# Patient Record
Sex: FEMALE | Race: BLACK OR AFRICAN AMERICAN | Employment: OTHER | URBAN - METROPOLITAN AREA
[De-identification: names, ages, dates, MRNs, and addresses within clinical notes are randomized per-mention and may not be internally consistent; named-entity substitution may affect disease eponyms.]

---

## 2018-01-11 ENCOUNTER — OFFICE VISIT (OUTPATIENT)
Dept: PHYSICAL THERAPY | Facility: HOSPITAL | Age: 73
End: 2018-01-11
Attending: ORTHOPAEDIC SURGERY
Payer: MEDICARE

## 2018-01-11 RX ORDER — COVID-19 ANTIGEN TEST
220 KIT MISCELLANEOUS
COMMUNITY
End: 2018-01-21

## 2018-01-11 NOTE — PROGRESS NOTES
Pre-Op Evaluation     Date of surgery: 1/18/18               Procedure: right total hip replacement  Physician: García Ji    Pertinent PMH: Patient reports no medications or illnesses  Living situation: Patient lives in a home alone.  She has 2 steps to ent to rehab, possibly home with home health if she makes adequate progress.

## 2018-01-15 ENCOUNTER — LAB ENCOUNTER (OUTPATIENT)
Dept: LAB | Facility: HOSPITAL | Age: 73
DRG: 470 | End: 2018-01-15
Attending: ORTHOPAEDIC SURGERY
Payer: MEDICARE

## 2018-01-15 DIAGNOSIS — M19.90 DEGENERATIVE JOINT DISEASE: ICD-10-CM

## 2018-01-15 LAB
ANTIBODY SCREEN: NEGATIVE
MRSA DNA SPEC QL NAA+PROBE: NEGATIVE
RH BLOOD TYPE: NEGATIVE

## 2018-01-15 PROCEDURE — 87641 MR-STAPH DNA AMP PROBE: CPT

## 2018-01-15 PROCEDURE — 36415 COLL VENOUS BLD VENIPUNCTURE: CPT

## 2018-01-15 PROCEDURE — 86850 RBC ANTIBODY SCREEN: CPT

## 2018-01-15 PROCEDURE — 86901 BLOOD TYPING SEROLOGIC RH(D): CPT

## 2018-01-15 PROCEDURE — 86900 BLOOD TYPING SEROLOGIC ABO: CPT

## 2018-01-15 NOTE — CM/SW NOTE
CM-Pre Admission Screening: This Writer met with Patient for a preadmission screening.  The Patient resides alone in [place]  in a single family home with  Living quarters on the first floor and 2 steps to enter.   The Patient has been [address], doing dong

## 2018-01-18 ENCOUNTER — ANESTHESIA EVENT (OUTPATIENT)
Dept: SURGERY | Facility: HOSPITAL | Age: 73
DRG: 470 | End: 2018-01-18
Payer: MEDICARE

## 2018-01-18 ENCOUNTER — HOSPITAL ENCOUNTER (INPATIENT)
Facility: HOSPITAL | Age: 73
LOS: 3 days | Discharge: NURSING, REHAB OR LONG-TERM CARE | DRG: 470 | End: 2018-01-21
Attending: ORTHOPAEDIC SURGERY | Admitting: ORTHOPAEDIC SURGERY
Payer: MEDICARE

## 2018-01-18 ENCOUNTER — SURGERY (OUTPATIENT)
Age: 73
End: 2018-01-18

## 2018-01-18 ENCOUNTER — APPOINTMENT (OUTPATIENT)
Dept: GENERAL RADIOLOGY | Facility: HOSPITAL | Age: 73
DRG: 470 | End: 2018-01-18
Attending: ORTHOPAEDIC SURGERY
Payer: MEDICARE

## 2018-01-18 ENCOUNTER — ANESTHESIA (OUTPATIENT)
Dept: SURGERY | Facility: HOSPITAL | Age: 73
DRG: 470 | End: 2018-01-18
Payer: MEDICARE

## 2018-01-18 DIAGNOSIS — M19.90 DEGENERATIVE JOINT DISEASE: ICD-10-CM

## 2018-01-18 DIAGNOSIS — M16.11 OSTEOARTHRITIS OF RIGHT HIP: Primary | ICD-10-CM

## 2018-01-18 PROCEDURE — 99232 SBSQ HOSP IP/OBS MODERATE 35: CPT | Performed by: HOSPITALIST

## 2018-01-18 PROCEDURE — 73501 X-RAY EXAM HIP UNI 1 VIEW: CPT | Performed by: ORTHOPAEDIC SURGERY

## 2018-01-18 PROCEDURE — 0SR901Z REPLACEMENT OF RIGHT HIP JOINT WITH METAL SYNTHETIC SUBSTITUTE, OPEN APPROACH: ICD-10-PCS | Performed by: ORTHOPAEDIC SURGERY

## 2018-01-18 DEVICE — MOD CUP 20 DEG LNR LG 50-54X32: Type: IMPLANTABLE DEVICE | Site: HIP | Status: FUNCTIONAL

## 2018-01-18 DEVICE — BONE SCREW 6.5X30 SELF-TAP: Type: IMPLANTABLE DEVICE | Site: HIP | Status: FUNCTIONAL

## 2018-01-18 DEVICE — 12/14 COCR FEM HEAD 32MM -3.5: Type: IMPLANTABLE DEVICE | Site: HIP | Status: FUNCTIONAL

## 2018-01-18 DEVICE — IMPLANTABLE DEVICE: Type: IMPLANTABLE DEVICE | Site: HIP | Status: FUNCTIONAL

## 2018-01-18 DEVICE — TM MOD CUP 50MM CLUSTER: Type: IMPLANTABLE DEVICE | Site: HIP | Status: FUNCTIONAL

## 2018-01-18 RX ORDER — ONDANSETRON 2 MG/ML
4 INJECTION INTRAMUSCULAR; INTRAVENOUS EVERY 4 HOURS PRN
Status: DISCONTINUED | OUTPATIENT
Start: 2018-01-18 | End: 2018-01-21

## 2018-01-18 RX ORDER — BISACODYL 10 MG
10 SUPPOSITORY, RECTAL RECTAL
Status: DISCONTINUED | OUTPATIENT
Start: 2018-01-18 | End: 2018-01-21

## 2018-01-18 RX ORDER — MORPHINE SULFATE 2 MG/ML
2 INJECTION, SOLUTION INTRAMUSCULAR; INTRAVENOUS EVERY 10 MIN PRN
Status: DISCONTINUED | OUTPATIENT
Start: 2018-01-18 | End: 2018-01-18 | Stop reason: HOSPADM

## 2018-01-18 RX ORDER — SODIUM CHLORIDE, SODIUM LACTATE, POTASSIUM CHLORIDE, CALCIUM CHLORIDE 600; 310; 30; 20 MG/100ML; MG/100ML; MG/100ML; MG/100ML
INJECTION, SOLUTION INTRAVENOUS CONTINUOUS
Status: DISCONTINUED | OUTPATIENT
Start: 2018-01-18 | End: 2018-01-18 | Stop reason: HOSPADM

## 2018-01-18 RX ORDER — SODIUM PHOSPHATE, DIBASIC AND SODIUM PHOSPHATE, MONOBASIC 7; 19 G/133ML; G/133ML
1 ENEMA RECTAL ONCE AS NEEDED
Status: DISCONTINUED | OUTPATIENT
Start: 2018-01-18 | End: 2018-01-21

## 2018-01-18 RX ORDER — CEFAZOLIN SODIUM/WATER 2 G/20 ML
2 SYRINGE (ML) INTRAVENOUS EVERY 8 HOURS
Status: COMPLETED | OUTPATIENT
Start: 2018-01-18 | End: 2018-01-19

## 2018-01-18 RX ORDER — FAMOTIDINE 20 MG/1
20 TABLET ORAL ONCE
Status: DISCONTINUED | OUTPATIENT
Start: 2018-01-18 | End: 2018-01-18 | Stop reason: HOSPADM

## 2018-01-18 RX ORDER — DOCUSATE SODIUM 100 MG/1
100 CAPSULE, LIQUID FILLED ORAL 2 TIMES DAILY
Status: DISCONTINUED | OUTPATIENT
Start: 2018-01-18 | End: 2018-01-21

## 2018-01-18 RX ORDER — HYDROMORPHONE HYDROCHLORIDE 1 MG/ML
0.2 INJECTION, SOLUTION INTRAMUSCULAR; INTRAVENOUS; SUBCUTANEOUS EVERY 5 MIN PRN
Status: DISCONTINUED | OUTPATIENT
Start: 2018-01-18 | End: 2018-01-18 | Stop reason: HOSPADM

## 2018-01-18 RX ORDER — METOCLOPRAMIDE 10 MG/1
10 TABLET ORAL ONCE
Status: DISCONTINUED | OUTPATIENT
Start: 2018-01-18 | End: 2018-01-18 | Stop reason: HOSPADM

## 2018-01-18 RX ORDER — HYDROCODONE BITARTRATE AND ACETAMINOPHEN 5; 325 MG/1; MG/1
1 TABLET ORAL AS NEEDED
Status: DISCONTINUED | OUTPATIENT
Start: 2018-01-18 | End: 2018-01-18 | Stop reason: HOSPADM

## 2018-01-18 RX ORDER — MORPHINE SULFATE 4 MG/ML
4 INJECTION, SOLUTION INTRAMUSCULAR; INTRAVENOUS EVERY 10 MIN PRN
Status: DISCONTINUED | OUTPATIENT
Start: 2018-01-18 | End: 2018-01-18 | Stop reason: HOSPADM

## 2018-01-18 RX ORDER — NEOSTIGMINE METHYLSULFATE 0.5 MG/ML
INJECTION INTRAVENOUS AS NEEDED
Status: DISCONTINUED | OUTPATIENT
Start: 2018-01-18 | End: 2018-01-18 | Stop reason: SURG

## 2018-01-18 RX ORDER — DIPHENHYDRAMINE HYDROCHLORIDE 50 MG/ML
25 INJECTION INTRAMUSCULAR; INTRAVENOUS ONCE AS NEEDED
Status: ACTIVE | OUTPATIENT
Start: 2018-01-18 | End: 2018-01-18

## 2018-01-18 RX ORDER — HYDROCODONE BITARTRATE AND ACETAMINOPHEN 5; 325 MG/1; MG/1
2 TABLET ORAL AS NEEDED
Status: DISCONTINUED | OUTPATIENT
Start: 2018-01-18 | End: 2018-01-18 | Stop reason: HOSPADM

## 2018-01-18 RX ORDER — MORPHINE SULFATE 10 MG/ML
6 INJECTION, SOLUTION INTRAMUSCULAR; INTRAVENOUS EVERY 10 MIN PRN
Status: DISCONTINUED | OUTPATIENT
Start: 2018-01-18 | End: 2018-01-18 | Stop reason: HOSPADM

## 2018-01-18 RX ORDER — HYDROMORPHONE HYDROCHLORIDE 1 MG/ML
0.4 INJECTION, SOLUTION INTRAMUSCULAR; INTRAVENOUS; SUBCUTANEOUS EVERY 5 MIN PRN
Status: DISCONTINUED | OUTPATIENT
Start: 2018-01-18 | End: 2018-01-18 | Stop reason: HOSPADM

## 2018-01-18 RX ORDER — ACETAMINOPHEN 325 MG/1
650 TABLET ORAL EVERY 4 HOURS PRN
Status: DISCONTINUED | OUTPATIENT
Start: 2018-01-18 | End: 2018-01-21

## 2018-01-18 RX ORDER — DEXAMETHASONE SODIUM PHOSPHATE 4 MG/ML
VIAL (ML) INJECTION AS NEEDED
Status: DISCONTINUED | OUTPATIENT
Start: 2018-01-18 | End: 2018-01-18 | Stop reason: SURG

## 2018-01-18 RX ORDER — DIPHENHYDRAMINE HCL 25 MG
25 CAPSULE ORAL EVERY 4 HOURS PRN
Status: DISCONTINUED | OUTPATIENT
Start: 2018-01-18 | End: 2018-01-21

## 2018-01-18 RX ORDER — SODIUM CHLORIDE 9 MG/ML
INJECTION, SOLUTION INTRAVENOUS CONTINUOUS PRN
Status: DISCONTINUED | OUTPATIENT
Start: 2018-01-18 | End: 2018-01-18 | Stop reason: SURG

## 2018-01-18 RX ORDER — GLYCOPYRROLATE 0.2 MG/ML
INJECTION INTRAMUSCULAR; INTRAVENOUS AS NEEDED
Status: DISCONTINUED | OUTPATIENT
Start: 2018-01-18 | End: 2018-01-18 | Stop reason: SURG

## 2018-01-18 RX ORDER — SODIUM CHLORIDE 0.9 % (FLUSH) 0.9 %
10 SYRINGE (ML) INJECTION AS NEEDED
Status: DISCONTINUED | OUTPATIENT
Start: 2018-01-18 | End: 2018-01-21

## 2018-01-18 RX ORDER — CEFAZOLIN SODIUM/WATER 2 G/20 ML
2 SYRINGE (ML) INTRAVENOUS ONCE
Status: COMPLETED | OUTPATIENT
Start: 2018-01-18 | End: 2018-01-18

## 2018-01-18 RX ORDER — ONDANSETRON 2 MG/ML
INJECTION INTRAMUSCULAR; INTRAVENOUS AS NEEDED
Status: DISCONTINUED | OUTPATIENT
Start: 2018-01-18 | End: 2018-01-18 | Stop reason: SURG

## 2018-01-18 RX ORDER — POLYETHYLENE GLYCOL 3350 17 G/17G
17 POWDER, FOR SOLUTION ORAL DAILY PRN
Status: DISCONTINUED | OUTPATIENT
Start: 2018-01-18 | End: 2018-01-21

## 2018-01-18 RX ORDER — ROCURONIUM BROMIDE 10 MG/ML
INJECTION, SOLUTION INTRAVENOUS AS NEEDED
Status: DISCONTINUED | OUTPATIENT
Start: 2018-01-18 | End: 2018-01-18 | Stop reason: SURG

## 2018-01-18 RX ORDER — ONDANSETRON 2 MG/ML
4 INJECTION INTRAMUSCULAR; INTRAVENOUS ONCE AS NEEDED
Status: COMPLETED | OUTPATIENT
Start: 2018-01-18 | End: 2018-01-18

## 2018-01-18 RX ORDER — DIPHENHYDRAMINE HYDROCHLORIDE 50 MG/ML
12.5 INJECTION INTRAMUSCULAR; INTRAVENOUS EVERY 4 HOURS PRN
Status: DISCONTINUED | OUTPATIENT
Start: 2018-01-18 | End: 2018-01-21

## 2018-01-18 RX ORDER — LIDOCAINE HYDROCHLORIDE 10 MG/ML
INJECTION, SOLUTION EPIDURAL; INFILTRATION; INTRACAUDAL; PERINEURAL AS NEEDED
Status: DISCONTINUED | OUTPATIENT
Start: 2018-01-18 | End: 2018-01-18 | Stop reason: SURG

## 2018-01-18 RX ORDER — SODIUM CHLORIDE, SODIUM LACTATE, POTASSIUM CHLORIDE, CALCIUM CHLORIDE 600; 310; 30; 20 MG/100ML; MG/100ML; MG/100ML; MG/100ML
INJECTION, SOLUTION INTRAVENOUS CONTINUOUS
Status: DISCONTINUED | OUTPATIENT
Start: 2018-01-18 | End: 2018-01-20

## 2018-01-18 RX ORDER — SENNOSIDES 8.6 MG
17.2 TABLET ORAL NIGHTLY
Status: DISCONTINUED | OUTPATIENT
Start: 2018-01-18 | End: 2018-01-21

## 2018-01-18 RX ORDER — ACETAMINOPHEN 500 MG
1000 TABLET ORAL ONCE
Status: COMPLETED | OUTPATIENT
Start: 2018-01-18 | End: 2018-01-18

## 2018-01-18 RX ORDER — HYDROMORPHONE HYDROCHLORIDE 1 MG/ML
0.6 INJECTION, SOLUTION INTRAMUSCULAR; INTRAVENOUS; SUBCUTANEOUS EVERY 5 MIN PRN
Status: DISCONTINUED | OUTPATIENT
Start: 2018-01-18 | End: 2018-01-18 | Stop reason: HOSPADM

## 2018-01-18 RX ORDER — NALOXONE HYDROCHLORIDE 0.4 MG/ML
80 INJECTION, SOLUTION INTRAMUSCULAR; INTRAVENOUS; SUBCUTANEOUS AS NEEDED
Status: DISCONTINUED | OUTPATIENT
Start: 2018-01-18 | End: 2018-01-18 | Stop reason: HOSPADM

## 2018-01-18 RX ORDER — MIDAZOLAM HYDROCHLORIDE 1 MG/ML
INJECTION INTRAMUSCULAR; INTRAVENOUS AS NEEDED
Status: DISCONTINUED | OUTPATIENT
Start: 2018-01-18 | End: 2018-01-18 | Stop reason: SURG

## 2018-01-18 RX ORDER — HYDROCODONE BITARTRATE AND ACETAMINOPHEN 5; 325 MG/1; MG/1
2 TABLET ORAL EVERY 4 HOURS PRN
Status: DISCONTINUED | OUTPATIENT
Start: 2018-01-18 | End: 2018-01-21

## 2018-01-18 RX ORDER — HYDROCODONE BITARTRATE AND ACETAMINOPHEN 5; 325 MG/1; MG/1
1 TABLET ORAL EVERY 4 HOURS PRN
Status: DISCONTINUED | OUTPATIENT
Start: 2018-01-18 | End: 2018-01-21

## 2018-01-18 RX ORDER — METOCLOPRAMIDE HYDROCHLORIDE 5 MG/ML
10 INJECTION INTRAMUSCULAR; INTRAVENOUS EVERY 6 HOURS PRN
Status: ACTIVE | OUTPATIENT
Start: 2018-01-18 | End: 2018-01-20

## 2018-01-18 RX ADMIN — MIDAZOLAM HYDROCHLORIDE 2 MG: 1 INJECTION INTRAMUSCULAR; INTRAVENOUS at 14:18:00

## 2018-01-18 RX ADMIN — SODIUM CHLORIDE: 9 INJECTION, SOLUTION INTRAVENOUS at 14:07:00

## 2018-01-18 RX ADMIN — NEOSTIGMINE METHYLSULFATE 4 MG: 0.5 INJECTION INTRAVENOUS at 14:49:00

## 2018-01-18 RX ADMIN — LIDOCAINE HYDROCHLORIDE 50 MG: 10 INJECTION, SOLUTION EPIDURAL; INFILTRATION; INTRACAUDAL; PERINEURAL at 13:57:00

## 2018-01-18 RX ADMIN — SODIUM CHLORIDE, SODIUM LACTATE, POTASSIUM CHLORIDE, CALCIUM CHLORIDE: 600; 310; 30; 20 INJECTION, SOLUTION INTRAVENOUS at 13:53:00

## 2018-01-18 RX ADMIN — SODIUM CHLORIDE, SODIUM LACTATE, POTASSIUM CHLORIDE, CALCIUM CHLORIDE: 600; 310; 30; 20 INJECTION, SOLUTION INTRAVENOUS at 14:50:00

## 2018-01-18 RX ADMIN — ROCURONIUM BROMIDE 40 MG: 10 INJECTION, SOLUTION INTRAVENOUS at 13:57:00

## 2018-01-18 RX ADMIN — DEXAMETHASONE SODIUM PHOSPHATE 4 MG: 4 MG/ML VIAL (ML) INJECTION at 14:00:00

## 2018-01-18 RX ADMIN — CEFAZOLIN SODIUM/WATER 2 G: 2 G/20 ML SYRINGE (ML) INTRAVENOUS at 14:13:00

## 2018-01-18 RX ADMIN — ONDANSETRON 4 MG: 2 INJECTION INTRAMUSCULAR; INTRAVENOUS at 14:48:00

## 2018-01-18 RX ADMIN — GLYCOPYRROLATE 0.8 MG: 0.2 INJECTION INTRAMUSCULAR; INTRAVENOUS at 14:49:00

## 2018-01-18 NOTE — ANESTHESIA POSTPROCEDURE EVALUATION
Patient: Danilo Nelson    Procedure Summary     Date:  01/18/18 Room / Location:  [address] [place] [address] MAIN OR 06 / [address] [place] [address] MAIN OR    Anesthesia Start:  9607 Anesthesia Stop:      Procedure:  HIP TOTAL REPLACEMENT (Right Hip) Diagnosis:  (Degenerative joint disease )    Feng Florentino

## 2018-01-18 NOTE — ANESTHESIA PREPROCEDURE EVALUATION
Anesthesia PreOp Note    HPI:     Falguni Batista is a 67year old female who presents for preoperative consultation requested by: Jed Vu MD    Date of Surgery: 1/18/2018    Procedure(s):  HIP TOTAL REPLACEMENT  Indication: Degenerative joint disease None on file       Available pre-op labs reviewed. Vital Signs: Body mass index is 20.9 kg/m². height is 1.6 m (5' 3\") and weight is 53.5 kg (118 lb). Her oral temperature is 98.5 °F (36.9 °C).  Her blood pressure is 136/60 and her pulse i

## 2018-01-18 NOTE — H&P
[address] Patient Status:  Surgery Admit    7/15/1945 MRN [id number]   Location [facility] Attending Gabby Sorensen MD   Hosp Day # 0 PCP No primary care provider on file. and round, reactive to light and accommodate. Pupils are approximately 3mm and react to 2mm with reaction to light. Oropharynx is clear. Neck: No tenderness to palpitation.   Full range of motion to flexion and extension, lateral rotation and lateral fle

## 2018-01-18 NOTE — PROGRESS NOTES
[facility] HOSP - [facility]    Progress Note    Usama Bentley Patient Status:  Surgery Admit    7/15/1945 MRN [id number]   [facility] UNIT Attending Rosa Wong MD   Hosp Day # 0 PCP No primary care provider on WBC, HGB, HCT, PLT, CREATSERUM, BUN, NA, K, CL, CO2, GLU, CA, ALB, ALKPHO, BILT, TP, AST, ALT, PTT, INR, PT, T4F, TSH, TSHREFLEX, SHIVA, LIP, GGT, PSA, DDIMER, ESRML, ESRPF, CRP, BNP, MG, PHOS, TROP, CK, CKMB, ESTEFANIA, RPR, B12, ETOH, POCGLU                    F

## 2018-01-19 LAB
ERYTHROCYTE [DISTWIDTH] IN BLOOD BY AUTOMATED COUNT: 12.5 % (ref 11–15)
HCT VFR BLD AUTO: 31.1 % (ref 35–48)
HGB BLD-MCNC: 10.4 G/DL (ref 12–16)
MCH RBC QN AUTO: 30.2 PG (ref 27–32)
MCHC RBC AUTO-ENTMCNC: 33.5 G/DL (ref 32–37)
MCV RBC AUTO: 90 FL (ref 80–100)
PLATELET # BLD AUTO: 157 K/UL (ref 140–400)
PMV BLD AUTO: 8.5 FL (ref 7.4–10.3)
RBC # BLD AUTO: 3.46 M/UL (ref 3.7–5.4)
WBC # BLD AUTO: 8.6 K/UL (ref 4–11)

## 2018-01-19 PROCEDURE — 99232 SBSQ HOSP IP/OBS MODERATE 35: CPT | Performed by: HOSPITALIST

## 2018-01-19 RX ORDER — HYDROCODONE BITARTRATE AND ACETAMINOPHEN 5; 325 MG/1; MG/1
1-2 TABLET ORAL EVERY 6 HOURS PRN
Qty: 40 TABLET | Refills: 0 | Status: SHIPPED | OUTPATIENT
Start: 2018-01-19 | End: 2018-12-26

## 2018-01-19 NOTE — OPERATIVE REPORT
[facility]    PATIENT'S NAME: Griselda Daft   ATTENDING PHYSICIAN: Santiago Perez MD   OPERATING PHYSICIAN: Basilia Dangelo MD   PATIENT ACCOUNT#:   [id number]    LOCATION:  [facility] [address] PACU [facility] 10  MEDICAL RECORD #:   [id number]       DATE OF Oscillating saw was used to resect the head. Attention was made to the acetabulum. Retractors were placed on the anterior posterior rims. The rim was debrided of soft tissue.   Reaming was performed, it was reamed up to a size 50, which had excellent

## 2018-01-19 NOTE — OCCUPATIONAL THERAPY NOTE
OCCUPATIONAL THERAPY EVALUATION - INPATIENT      Room Number: 436/436-A  Evaluation Date: 1/19/2018  Type of Evaluation: Initial  Presenting Problem: l kevon    Physician Order: IP Consult to Occupational Therapy  Reason for Therapy: ADL/IADL Dysfunction and History  Past Medical History:   Diagnosis Date   • Endometriosis    • Parkinson's disease ([facility] [address]    • Scoliosis      Past Surgical History  Past Surgical History:  No date: HYSTERECTOMY      Comment: partial    HOME SITUATION  Type of Home: [address] [place] [address] Minded taking off regular upper body clothing?: None  -   Taking care of personal grooming such as brushing teeth?: None  -   Eating meals?: None    AM-PAC Score:  Score: 18  Approx Degree of Impairment: 46.65%  Standardized Score (AM-PAC Scale): 38.66  CMS Modif

## 2018-01-19 NOTE — CM/SW NOTE
ESTRELLA met with the pt. At bedside to confirm her plan from Preadmission screening to discharge to rehab when medically stable. The pt's first choice for rehab is [facility] and her second choice is [place].   Referral made to both facilities and DON screen h

## 2018-01-19 NOTE — PROGRESS NOTES
[facility]    Progress Note    Azam Grace Patient Status:  Inpatient    7/15/1945 MRN [id number]   Location [facility] 4W/SW/SE Attending Blanche Lujan MD   Hosp Day # 1 PCP No primary care provider on file.      Subjective:

## 2018-01-19 NOTE — PHYSICAL THERAPY NOTE
PHYSICAL THERAPY HIP EVALUATION - INPATIENT     Room Number: 436/436-A  Evaluation Date: 1/19/2018  Type of Evaluation: Initial  Physician Order: PT Eval and Treat    Presenting Problem: right kevon  Reason for Therapy: Mobility Dysfunction and Discharge Rita hip      Past Medical History  Past Medical History:   Diagnosis Date   • Endometriosis    • Parkinson's disease ([facility] [address]    • Scoliosis        Past Surgical History  Past Surgical History:  No date: HYSTERECTOMY      Comment: partial    HOME SITUATION  Type blankets)?: A Lot   -   Sitting down on and standing up from a chair with arms (e.g., wheelchair, bedside commode, etc.): A Lot   -   Moving from lying on back to sitting on the side of the bed?: A Lot   How much help from another person does the patient c and cga   Goal #4   Current Status    Goal #5 Patient verbalizes and/or demonstrates all precautions and safety concerns independently   Goal #5   Current Status    Goal #6 Patient independently performs home exercise program for ROM/strengthening per the

## 2018-01-19 NOTE — CM/SW NOTE
The pt. Is scheduled to discharge to [facility] on Sunday 1/21 at 1p, via [address]. The pt. Is aware of discharge and medicar costs.       Report [facility], [address] [place] [address]

## 2018-01-19 NOTE — PROGRESS NOTES
[facility]  Progress Note     J Carloslindaumair Darden  : 7/15/1945    Status: Inpatient  Day #: 1    Attending: Lizzy Lr MD  PCP: No primary care provider on file.       Assessment and Plan     Osteoarthritis R hip   Post operative day 1 stat GFRAA, GFRNAA, CA, ALB, NA, K, CL, CO2, GLU, MG, PHOS, BILT, AST, ALT, ALKPHO, TP, SHIVA, GGT, LIP, PTT, PT, INR, DDIMER, TSH, T4F, ESR, CRP, BNP, TROP, CK, CKMB, ESTEFANIA, RPR, B12, ETOH in the last 72 hours.     Xr Hip W Or Wo Pelvis 1 View, Right (nfu=[id number])

## 2018-01-20 LAB
ANION GAP SERPL CALC-SCNC: 6 MMOL/L (ref 0–18)
BUN SERPL-MCNC: 12 MG/DL (ref 8–20)
BUN/CREAT SERPL: 16.2 (ref 10–20)
CALCIUM SERPL-MCNC: 8.4 MG/DL (ref 8.5–10.5)
CHLORIDE SERPL-SCNC: 104 MMOL/L (ref 95–110)
CO2 SERPL-SCNC: 25 MMOL/L (ref 22–32)
CREAT SERPL-MCNC: 0.74 MG/DL (ref 0.5–1.5)
ERYTHROCYTE [DISTWIDTH] IN BLOOD BY AUTOMATED COUNT: 13 % (ref 11–15)
GLUCOSE SERPL-MCNC: 104 MG/DL (ref 70–99)
HCT VFR BLD AUTO: 30.7 % (ref 35–48)
HCT VFR BLD AUTO: 30.7 % (ref 35–48)
HGB BLD-MCNC: 10.1 G/DL (ref 12–16)
HGB BLD-MCNC: 10.1 G/DL (ref 12–16)
MCH RBC QN AUTO: 30 PG (ref 27–32)
MCHC RBC AUTO-ENTMCNC: 32.9 G/DL (ref 32–37)
MCV RBC AUTO: 91.2 FL (ref 80–100)
OSMOLALITY UR CALC.SUM OF ELEC: 280 MOSM/KG (ref 275–295)
PLATELET # BLD AUTO: 161 K/UL (ref 140–400)
PMV BLD AUTO: 8.6 FL (ref 7.4–10.3)
POTASSIUM SERPL-SCNC: 3.8 MMOL/L (ref 3.3–5.1)
RBC # BLD AUTO: 3.37 M/UL (ref 3.7–5.4)
SODIUM SERPL-SCNC: 135 MMOL/L (ref 136–144)
WBC # BLD AUTO: 9.5 K/UL (ref 4–11)

## 2018-01-20 PROCEDURE — 99232 SBSQ HOSP IP/OBS MODERATE 35: CPT | Performed by: HOSPITALIST

## 2018-01-20 RX ORDER — PSEUDOEPHEDRINE HCL 30 MG
100 TABLET ORAL 2 TIMES DAILY
Qty: 30 CAPSULE | Refills: 0 | Status: SHIPPED
Start: 2018-01-20 | End: 2018-12-26

## 2018-01-20 RX ORDER — POLYETHYLENE GLYCOL 3350 17 G/17G
17 POWDER, FOR SOLUTION ORAL DAILY PRN
Qty: 30 EACH | Refills: 0 | Status: SHIPPED
Start: 2018-01-20 | End: 2018-12-26

## 2018-01-20 NOTE — PLAN OF CARE
DISCHARGE PLANNING    • Discharge to home or other facility with appropriate resources Progressing        HEMATOLOGIC - ADULT    • Maintains hematologic stability Progressing        Impaired Functional Mobility    • Achieve highest/safest level of mobility

## 2018-01-20 NOTE — PROGRESS NOTES
[facility]  Progress Note     Concepcion Rey  : 7/15/1945    Status: Inpatient  Day #: 2    Attending: King Jackson MD  PCP: No primary care provider on file.       Assessment and Plan     Osteoarthritis R hip   Post operative day 2 stat Recent Labs   Lab  01/20/18   0601   BUN  12   CREATSERUM  0.74   GFRAA  >60   GFRNAA  >60   CA  8.4*   NA  135*   K  3.8   CL  104   CO2  25   GLU  104*       Xr Hip W Or Wo Pelvis 1 View, Right (cpt=73501)    Result Date: 1/18/2018  CONCLUSION:  Naomy

## 2018-01-20 NOTE — PROGRESS NOTES
Brian Downing    Wt Readings from Last 6 Encounters:  01/18/18 : 118 lb (53.5 kg)    67year old  Surgical/Procedural Cases on this Admission     Case IDs Date Procedure Surgeon Location Status    [id number] 1/18/18 HIP TOTAL REPLACEMENT Torsten Sepulveda MD [address] [place] [address] looks good.           Lab Results  Component Value Date   WBC 9.5 01/20/2018   HGB 10.1 01/20/2018   HGB 10.1 01/20/2018   HCT 30.7 01/20/2018   HCT 30.7 01/20/2018    01/20/2018   CREATSERUM 0.74 01/20/2018   BUN 12 01/20/2018    01/20/2018

## 2018-01-20 NOTE — PAYOR COMM NOTE
Admit Orders     Start     Ordered    01/18/18 8081  Admit to inpatient Once  Once     Ordering Provider:  Fco Anaya MD   Question Answer Comment   Diagnosis Degenerative joint disease    Level of Care Acute        01/18/18 1612      -------------- history unknown: Yes      reports that she has never smoked. She has never used smokeless tobacco. She reports that she does not drink alcohol or use drugs.     Allergies:  No Known Allergies    Home Medications:    Prescriptions Prior to Admission:  Naprox arthroplasty. [employer]  1/18/2018  11:27 AM[JN.1]    Electronically signed by Christopher Crowell MD on 1/18/2018 11:28 AM   Attribution Jacelyn Pallas. 1 - Christopher Crowell MD on 1/18/2018 11:27 AM                    MEDICATIONS ADMINISTERED IN LAST 1 DAY

## 2018-01-20 NOTE — PHYSICAL THERAPY NOTE
PHYSICAL THERAPY HIP TREATMENT NOTE - INPATIENT    Room Number: 436/436-A            Presenting Problem: right kevon    Problem List  Principal Problem:    Osteoarthritis of right hip      ASSESSMENT   Consulted w/ RN prior to seeing pt.  Pt was received sitt SHORT FORM - BASIC MOBILITY  How much difficulty does the patient currently have. ..  -   Turning over in bed (including adjusting bedclothes, sheets and blankets)?: A Lot   -   Sitting down on and standing up from a chair with arms (e.g., wheelchair, bedsi ROM/strengthening per the instructions provided in preparation for discharge.    Goal #6  Current Status IN PROGRESS

## 2018-01-21 VITALS
TEMPERATURE: 98 F | HEIGHT: 63 IN | HEART RATE: 77 BPM | WEIGHT: 118 LBS | BODY MASS INDEX: 20.91 KG/M2 | RESPIRATION RATE: 16 BRPM | DIASTOLIC BLOOD PRESSURE: 51 MMHG | OXYGEN SATURATION: 97 % | SYSTOLIC BLOOD PRESSURE: 104 MMHG

## 2018-01-21 LAB
ERYTHROCYTE [DISTWIDTH] IN BLOOD BY AUTOMATED COUNT: 13.2 % (ref 11–15)
HCT VFR BLD AUTO: 28.4 % (ref 35–48)
HGB BLD-MCNC: 9.5 G/DL (ref 12–16)
MCH RBC QN AUTO: 30.2 PG (ref 27–32)
MCHC RBC AUTO-ENTMCNC: 33.3 G/DL (ref 32–37)
MCV RBC AUTO: 90.4 FL (ref 80–100)
PLATELET # BLD AUTO: 147 K/UL (ref 140–400)
PMV BLD AUTO: 8.7 FL (ref 7.4–10.3)
RBC # BLD AUTO: 3.14 M/UL (ref 3.7–5.4)
WBC # BLD AUTO: 8.6 K/UL (ref 4–11)

## 2018-01-21 PROCEDURE — 99239 HOSP IP/OBS DSCHRG MGMT >30: CPT | Performed by: HOSPITALIST

## 2018-01-21 NOTE — DISCHARGE SUMMARY
[facility] HOSPITALIST  DISCHARGE SUMMARY     Sandee Petersen Patient Status:  Inpatient    7/15/1945 MRN [id number]   Location [facility] 4W/SW/SE Attending Bala Miles MD   Hosp Day # 3 PCP No primary care provider on file.      Date of Admissio daily.   Quantity:  30 capsule  Refills:  0     HYDROcodone-acetaminophen 5-325 MG Tabs  Commonly known as:  NORCO      Take 1-2 tablets by mouth every 6 (six) hours as needed for Pain.    Quantity:  40 tablet  Refills:  0     PEG 3350 Pack  Commonly known INSTRUCTIONS:       Other Discharge Instructions: Follow up 2 weeks postop. Keep wound dry. Xarelto for 4 weeks postop. Full weight bearing.   Norco for pain            Hospital Discharge Diagnoses: OA    Lace+ Score: 3  59-90 High Risk  29-58 [facility]

## 2018-01-21 NOTE — PLAN OF CARE
DISCHARGE PLANNING    • Discharge to home or other facility with appropriate resources Progressing    [facility] PLANS TO D/C TO REHAB, [facility] AT 1300 ON 1/21/18      HEMATOLOGIC - ADULT    • Maintains hematologic stability Progressing    [facility] HAS SCDs IN PL INTACT; VOIDING FREELY, USED BEDPAN ONCE THIS EVENING PER PATIENT REQUEST; TOLERATING GENERAL DIET W/O N/V

## 2018-01-22 ENCOUNTER — SNF VISIT (OUTPATIENT)
Dept: INTERNAL MEDICINE CLINIC | Facility: SKILLED NURSING FACILITY | Age: 73
End: 2018-01-22

## 2018-01-22 DIAGNOSIS — Z96.641 STATUS POST TOTAL REPLACEMENT OF RIGHT HIP: ICD-10-CM

## 2018-01-22 DIAGNOSIS — D64.9 ANEMIA, UNSPECIFIED TYPE: ICD-10-CM

## 2018-01-22 DIAGNOSIS — M16.11 PRIMARY OSTEOARTHRITIS OF RIGHT HIP: ICD-10-CM

## 2018-01-22 DIAGNOSIS — R53.1 WEAKNESS: ICD-10-CM

## 2018-01-22 PROCEDURE — 99309 SBSQ NF CARE MODERATE MDM 30: CPT | Performed by: NURSE PRACTITIONER

## 2018-01-22 NOTE — PAYOR COMM NOTE
--------------  CONTINUED STAY REVIEW    Payor: [facility] #:  [id number]  Authorization Number: [id number]    Admit date: 1/18/18  Admit time: 1849  Discharged 1/21    Admitting Physician: Jomar Blancas MD  Attending Physician BLOOD LOSS:  100 mL.     INDICATIONS:  Patient is a 77-year-old female with advanced osteoarthritis of her right hip. Failed conservative management. Elected a right total hip arthroplasty.   Understood the risk of surgery, including but not limited to, f Attention was made to the femur. The lateral neck was debrided of soft tissue. Box chisel was used to open the neck in 15 degrees of anteversion. The canal was opened. Lateralizing reaming was performed.   It was reamed up to a 12 and broached to a 12, - D/C plan: subacute rehab  - Orthopedic surgery following.      Normocytic anemia  - Hb 10.4 baseline unknown no labs done here previously  - Rpt H/H in AM.               Quality:  · DVT Prophylaxis: Xarelto   · CODE status: Full.            Amara Mcmanus • Senna  17.2 mg Oral Nightly   • docusate sodium  100 mg Oral BID      PRN Meds: Normal Saline Flush, sodium chloride 0.9%, acetaminophen **OR** HYDROcodone-acetaminophen **OR** HYDROcodone-acetaminophen, PEG 3350, magnesium hydroxide, bisacodyl, FLEET EN Abdomen: Soft, nontender, nondistended. Positive bowel sounds. No rebound or guarding. Neurologic: No focal neurological deficits.    Musculoskeletal: Motor intact  Extremities: No edema.        Intake/Output Summary (Last 24 hours) at 01/20/18 1343  Last : Thiago Bermudez MD (Physician)      [facility] HOSPITALIST  DISCHARGE SUMMARY            Concepcion Rey Patient Status:  Inpatient    7/15/1945 MRN [id number]   Location [facility] 4W/SW/SE Attending Thiago Bermudez MD   Hosp Day # 3 PCP No docusate sodium 100 MG Caps  Commonly known as:  COLACE    Take 100 mg by mouth 2 (two) times daily.  Quantity:  30 capsule  Refills:  0   HYDROcodone-acetaminophen 5-325 MG Tabs  Commonly known as:  NORCO    Take 1-2 tablets by mouth every 6 (six) hours as Extremities: No edema. -----------------------------------------------------------------------------------------------  PATIENT DISCHARGE INSTRUCTIONS:      Other Discharge Instructions:        Follow up 2 weeks postop. Keep wound dry.   Xarelto for 4 wee

## 2018-01-22 NOTE — PROGRESS NOTES
HPI: Anatoly Moscoso  Is a 68 yo female with severe osteoarthritis of the right hip  admitted to [address] [place] [address] for an elective R total hip arthroplasty by Dr. Ashley Jc 1/18/18. She had a stable postoperative course and was discharged to [facility] for [address].      Chief co po q6 prn/tylenol prn for pain control  - continue bowel program       2. Normocytic anemia. Stable. - Hb  9.5 1/21, monitor cbc in rehab    3. Hx Parkinson's disease. Stable. Not on medications. F/U outpatient. 4. Hx endometriosis s/p hysterectomy.  Tomi Salazar

## 2018-01-26 ENCOUNTER — SNF VISIT (OUTPATIENT)
Dept: INTERNAL MEDICINE CLINIC | Facility: SKILLED NURSING FACILITY | Age: 73
End: 2018-01-26

## 2018-01-26 DIAGNOSIS — R53.1 WEAKNESS: ICD-10-CM

## 2018-01-26 DIAGNOSIS — Z96.641 STATUS POST TOTAL REPLACEMENT OF RIGHT HIP: ICD-10-CM

## 2018-01-26 PROCEDURE — 99307 SBSQ NF CARE SF MDM 10: CPT | Performed by: NURSE PRACTITIONER

## 2018-01-26 NOTE — PROGRESS NOTES
HPI: Sandee Petersen  Is a 66 yo female with severe osteoarthritis of the right hip  admitted to [address] [place] [address] for an elective R total hip arthroplasty by Dr. Funmi Johnson 1/18/18.  She had a stable postoperative course and was discharged to [facility] for [address].      Chief ana Hx endometriosis s/p hysterectomy.  Stable.

## 2018-07-27 ENCOUNTER — HOSPITAL ENCOUNTER (OUTPATIENT)
Dept: MRI IMAGING | Facility: HOSPITAL | Age: 73
Discharge: HOME | End: 2018-07-27
Attending: ORTHOPAEDIC SURGERY
Payer: MEDICARE

## 2018-07-27 DIAGNOSIS — M54.50 LOWER BACK PAIN: ICD-10-CM

## 2018-07-27 DIAGNOSIS — M25.551 RIGHT HIP PAIN: ICD-10-CM

## 2018-07-27 DIAGNOSIS — M54.50 LUMBAR SPINE PAIN: ICD-10-CM

## 2018-07-27 PROCEDURE — 72148 MRI LUMBAR SPINE W/O DYE: CPT | Performed by: ORTHOPAEDIC SURGERY

## 2018-09-04 ENCOUNTER — LAB ENCOUNTER (OUTPATIENT)
Dept: LAB | Facility: HOSPITAL | Age: 73
End: 2018-09-04
Attending: OPTOMETRIST
Payer: MEDICARE

## 2018-09-04 DIAGNOSIS — H04.129 DRY EYE SYNDROME: ICD-10-CM

## 2018-09-04 DIAGNOSIS — E55.9 AVITAMINOSIS D: Primary | ICD-10-CM

## 2018-09-04 PROCEDURE — 36415 COLL VENOUS BLD VENIPUNCTURE: CPT

## 2018-09-04 PROCEDURE — 82306 VITAMIN D 25 HYDROXY: CPT

## 2018-09-05 LAB — 25(OH)D3 SERPL-MCNC: 21.1 NG/ML

## 2018-12-26 ENCOUNTER — OFFICE VISIT (OUTPATIENT)
Dept: PAIN CLINIC | Facility: HOSPITAL | Age: 73
End: 2018-12-26
Attending: ANESTHESIOLOGY
Payer: MEDICARE

## 2018-12-26 VITALS
WEIGHT: 110 LBS | DIASTOLIC BLOOD PRESSURE: 68 MMHG | HEIGHT: 65 IN | HEART RATE: 62 BPM | BODY MASS INDEX: 18.33 KG/M2 | RESPIRATION RATE: 18 BRPM | SYSTOLIC BLOOD PRESSURE: 132 MMHG

## 2018-12-26 DIAGNOSIS — M16.11 PRIMARY OSTEOARTHRITIS OF RIGHT HIP: Primary | ICD-10-CM

## 2018-12-26 DIAGNOSIS — M48.061 SPINAL STENOSIS AT L4-L5 LEVEL: ICD-10-CM

## 2018-12-26 PROCEDURE — 99201 HC OUTPT EVAL AND MGNT NEW PT LEVEL 1: CPT

## 2018-12-26 RX ORDER — ACETAMINOPHEN 500 MG
500 TABLET ORAL EVERY 6 HOURS PRN
COMMUNITY

## 2018-12-26 NOTE — CHRONIC PAIN
Initial Consultation Note      HISTORY OF PRESENT ILLNESS:  Abril Randhawa is a 68year old old female referred to the pain clinic  for evaluation treatment of her right lower back hip pain.   Beth is a very nice 80-year-old black female who complains of pain disease    Past Medical History:   Diagnosis Date   • Endometriosis    • Parkinson's disease ([facility] [address]    • Scoliosis        FAMILY HISTORY:  Family History   Family history unknown: Yes       SOCIAL HISTORY:  Social History    Socioeconomic History      Brittnee Carmona Brachial 2/4 2/4     SLR: negative  SIJ tenderness negative  Joint Exam: Normal  TPs:  Present within lumbo-sacral paraspinal muscles    Right Deep tendon reflexes: 1/4   Left Deep tendon reflexes: 1/4   Babinski Reflex: absent bilaterally   Temperature: to discussion plan.  Greater than 50% of the time was spent with counseling (nature of discussion centered around pain, therapy, and treatment options), face to face time, time spent reviewing data, obtaining patient information and discussing the care with

## 2018-12-26 NOTE — PROGRESS NOTES
INITIAL CONSULT:  12/26/18  PRESENTS AMBULATORY TO CPM;  NEW CONSULT C/O RT HIP PAIN RADIATING DOWN THE RLE;  PT STATES RT HIP REPLCMT 1/2018 BY DR. DR. Kirsten Howard;  SHE HAD REHAD AT [address] WAS D/CD IN February;  SHE REPORTS IN MARCH SHE STARTED TO N

## 2018-12-27 ENCOUNTER — DOCUMENTATION ONLY (OUTPATIENT)
Dept: PAIN CLINIC | Facility: HOSPITAL | Age: 73
End: 2018-12-27

## 2019-01-29 ENCOUNTER — OFFICE VISIT (OUTPATIENT)
Dept: PAIN CLINIC | Facility: HOSPITAL | Age: 74
End: 2019-01-29
Attending: ANESTHESIOLOGY
Payer: MEDICARE

## 2019-01-29 VITALS
SYSTOLIC BLOOD PRESSURE: 128 MMHG | HEIGHT: 65 IN | HEART RATE: 68 BPM | WEIGHT: 110 LBS | RESPIRATION RATE: 18 BRPM | DIASTOLIC BLOOD PRESSURE: 72 MMHG | BODY MASS INDEX: 18.33 KG/M2

## 2019-01-29 DIAGNOSIS — M25.551 HIP PAIN, ACUTE, RIGHT: Primary | ICD-10-CM

## 2019-01-29 PROCEDURE — 99211 OFF/OP EST MAY X REQ PHY/QHP: CPT

## 2019-01-29 NOTE — PROGRESS NOTES
INITIAL CONSULT:  12/26/18  PRESENTS AMBULATORY TO CPM;  NEW CONSULT C/O RT HIP PAIN RADIATING DOWN THE RLE;  PT STATES RT HIP REPLCMT 1/2018 BY DR. DR. Aashish Dunaway;  SHE HAD REHAD AT [address] WAS D/CD IN February;  SHE REPORTS IN MARCH SHE STARTED TO N

## 2019-01-29 NOTE — CHRONIC PAIN
Follow-up Note    HISTORY OF PRESENT ILLNESS:  Brian Downing is a 68year old old female, returns to the clinic for evaluation and treatment of her chronic right hip pain and leg pain Myles ramirez 79-year-old black female who had a right hip replacement last Scoliosis        SURGICAL HISTORY:  Past Surgical History:   Procedure Laterality Date   • HIP TOTAL REPLACEMENT Right 1/18/2018    Performed by Tatianna Baum MD at [address] [place] [address] MAIN OR   • HYSTERECTOMY      partial       FAMILY HISTORY:  Family History   Family Lower Extremity:  Normal  Right Upper Extremity: Normal  Left Upper Extremity: Normal  Edema - Absent    SLR: negative   SIJ tenderness:  negative     TPs: Absent:       IMAGING:  No results found.     [place] PHYSICIAN MONITORING PROGRAM REVIEWED  No      ASSES

## 2019-02-19 ENCOUNTER — OFFICE VISIT (OUTPATIENT)
Dept: PAIN CLINIC | Facility: HOSPITAL | Age: 74
End: 2019-02-19
Attending: ANESTHESIOLOGY
Payer: MEDICARE

## 2019-02-19 ENCOUNTER — ANESTHESIA (OUTPATIENT)
Dept: PAIN CLINIC | Facility: HOSPITAL | Age: 74
End: 2019-02-19

## 2019-02-19 ENCOUNTER — ANESTHESIA EVENT (OUTPATIENT)
Dept: PAIN CLINIC | Facility: HOSPITAL | Age: 74
End: 2019-02-19

## 2019-02-19 VITALS
HEART RATE: 66 BPM | WEIGHT: 110 LBS | SYSTOLIC BLOOD PRESSURE: 128 MMHG | BODY MASS INDEX: 18.33 KG/M2 | DIASTOLIC BLOOD PRESSURE: 74 MMHG | HEIGHT: 65 IN

## 2019-02-19 DIAGNOSIS — M48.061 SPINAL STENOSIS AT L4-L5 LEVEL: ICD-10-CM

## 2019-02-19 DIAGNOSIS — M16.11 PRIMARY OSTEOARTHRITIS OF RIGHT HIP: ICD-10-CM

## 2019-02-19 DIAGNOSIS — M25.551 HIP PAIN, ACUTE, RIGHT: Primary | ICD-10-CM

## 2019-02-19 PROCEDURE — 99211 OFF/OP EST MAY X REQ PHY/QHP: CPT

## 2019-02-19 NOTE — PROGRESS NOTES
INITIAL CONSULT:  12/26/18  PRESENTS AMBULATORY TO CPM;  NEW CONSULT C/O RT HIP PAIN RADIATING DOWN THE RLE;  PT STATES RT HIP REPLCMT 1/2018 BY DR. DR. Jay Mike;  SHE HAD REHAD AT [address] WAS D/CD IN February;  SHE REPORTS IN MARCH SHE STARTED TO

## 2019-02-19 NOTE — CHRONIC PAIN
Lionel Fuentes a 35-year-old black female with right hip and right leg pain she has lumbar spinal stenosis and DDD thinking some of her pain was due to radicular cause she underwent 2 transforaminal injections with about 25% reduction of her pain we also ordered a

## 2019-02-21 ENCOUNTER — TELEPHONE (OUTPATIENT)
Dept: PAIN CLINIC | Facility: HOSPITAL | Age: 74
End: 2019-02-21

## 2019-02-21 DIAGNOSIS — M25.551 RIGHT HIP PAIN: Primary | ICD-10-CM

## 2019-02-25 ENCOUNTER — HOSPITAL ENCOUNTER (OUTPATIENT)
Dept: GENERAL RADIOLOGY | Facility: HOSPITAL | Age: 74
Discharge: HOME | End: 2019-02-25
Attending: NURSE PRACTITIONER
Payer: MEDICARE

## 2019-02-25 DIAGNOSIS — M25.551 RIGHT HIP PAIN: ICD-10-CM

## 2019-02-25 PROCEDURE — 73502 X-RAY EXAM HIP UNI 2-3 VIEWS: CPT | Performed by: NURSE PRACTITIONER

## 2019-03-04 ENCOUNTER — OFFICE VISIT (OUTPATIENT)
Dept: OBGYN CLINIC | Facility: CLINIC | Age: 74
End: 2019-03-04
Payer: COMMERCIAL

## 2019-03-04 VITALS
DIASTOLIC BLOOD PRESSURE: 72 MMHG | HEIGHT: 64 IN | HEART RATE: 82 BPM | SYSTOLIC BLOOD PRESSURE: 125 MMHG | BODY MASS INDEX: 21 KG/M2 | WEIGHT: 123 LBS

## 2019-03-04 DIAGNOSIS — Z90.710 HISTORY OF ABDOMINAL HYSTERECTOMY: ICD-10-CM

## 2019-03-04 DIAGNOSIS — Z01.419 ENCOUNTER FOR GYNECOLOGICAL EXAMINATION WITHOUT ABNORMAL FINDING: Primary | ICD-10-CM

## 2019-03-04 PROCEDURE — G0101 CA SCREEN;PELVIC/BREAST EXAM: HCPCS | Performed by: OBSTETRICS & GYNECOLOGY

## 2019-03-04 NOTE — PROGRESS NOTES
Concepcion Rey is a 68year old female  No LMP recorded. Patient has had a hysterectomy. Patient presents with:  Gyn Exam: NP, Annual -- Prior Aron Villarreal -- hx \"partial total hyst\" for endometriosis & female problems in her 35s.   Wishes to come meet m Physical activity:        Days per week: Not on file        Minutes per session: Not on file      Stress: Not on file    Relationships      Social connections:        Talks on phone: Not on file        Gets together: Not on file        Attends [employer] bleeding. PHYSICAL EXAM:   Blood pressure 125/72, pulse 82, height 5' 4\" (1.626 m), weight 123 lb (55.8 kg).   Constitutional:  well developed, well nourished  Head/Face:  normocephalic  Neck/Thyroid: thyroid symmetric, no thyromegaly, no nodules, no

## 2019-03-19 ENCOUNTER — HOSPITAL ENCOUNTER (OUTPATIENT)
Dept: NUCLEAR MEDICINE | Facility: HOSPITAL | Age: 74
Discharge: HOME | End: 2019-03-19
Attending: ANESTHESIOLOGY
Payer: MEDICARE

## 2019-03-19 DIAGNOSIS — M25.551 HIP PAIN, ACUTE, RIGHT: ICD-10-CM

## 2019-03-19 PROCEDURE — 78315 BONE IMAGING 3 PHASE: CPT | Performed by: ANESTHESIOLOGY

## 2019-03-25 ENCOUNTER — OFFICE VISIT (OUTPATIENT)
Dept: PAIN CLINIC | Facility: HOSPITAL | Age: 74
End: 2019-03-25
Attending: NURSE PRACTITIONER
Payer: MEDICARE

## 2019-03-25 DIAGNOSIS — M16.11 PRIMARY OSTEOARTHRITIS OF RIGHT HIP: Primary | ICD-10-CM

## 2019-03-25 PROCEDURE — 99213 OFFICE O/P EST LOW 20 MIN: CPT

## 2019-03-25 PROCEDURE — 3E013KZ INTRODUCTION OF OTHER DIAGNOSTIC SUBSTANCE INTO SUBCUTANEOUS TISSUE, PERCUTANEOUS APPROACH: ICD-10-PCS | Performed by: ANESTHESIOLOGY

## 2019-03-25 PROCEDURE — 20552 NJX 1/MLT TRIGGER POINT 1/2: CPT

## 2019-03-25 PROCEDURE — 99211 OFF/OP EST MAY X REQ PHY/QHP: CPT

## 2019-03-25 RX ORDER — ACETAMINOPHEN AND CODEINE PHOSPHATE 60; 300 MG/1; MG/1
1 TABLET ORAL AS NEEDED
COMMUNITY
End: 2019-08-09

## 2019-03-25 NOTE — CHRONIC PAIN
[facility]  Pain Procedure Note    Procedure Type:  Right hip scar infiltration   Prep:  Hand Washing, Time Out, Sterile Gloves  Site Prep:  Alcohol   Position:  Sitting  Level:  Right hip   Local Anesthetic:  1/4% Marcaine 10 ml   Needle:

## 2019-03-25 NOTE — CHRONIC PAIN
Follow-up Note  CC: right hip pain   HISTORY OF PRESENT ILLNESS:  Cherylene Loud is a 68year old old female, originally referred to the pain clinic by Dr. Dan ref.  provider found, with history of Primary osteoarthritis of right hip  (primary encounter diagno current lymphedema  Allergic/Immunologic:  Negative  Musculoskeletal: As above  Neurological: As above  Denies chest pain, shortness of breath.     MEDICAL HISTORY:  Patient Active Problem List:     Osteoarthritis of right hip     Degenerative joint disease of clubs or organizations: Not on file        Relationship status: Not on file      Intimate partner violence:        Fear of current or ex partner: Not on file        Emotionally abused: Not on file        Physically abused: Not on file        Forced sexu prosthesis. WHOLE BODY IMAGES:            There is severe scoliosis. Dilated right renal collecting system noted. There is uptake in the shoulders consistent with DJD.              =====  CONCLUSION:   1.  No evidence of prosthesis loosening or infection

## 2019-03-25 NOTE — PROGRESS NOTES
Patient presents to [facility] ambulatory for follow up. Dr Óscar Abrams ordered a bone scan for pt last visit. She is here to discuss results and make a plan for treatment. She takes T#3 as needed. UMBERTON Van in to see patient. See provider notes.

## 2019-07-17 ENCOUNTER — HOSPITAL ENCOUNTER (OUTPATIENT)
Dept: MRI IMAGING | Facility: HOSPITAL | Age: 74
Discharge: HOME | End: 2019-07-17
Attending: ORTHOPAEDIC SURGERY
Payer: MEDICARE

## 2019-07-17 DIAGNOSIS — M70.61 GREATER TROCHANTERIC BURSITIS, RIGHT: ICD-10-CM

## 2019-07-17 DIAGNOSIS — M25.551 RIGHT HIP PAIN: ICD-10-CM

## 2019-07-17 PROCEDURE — 73721 MRI JNT OF LWR EXTRE W/O DYE: CPT | Performed by: ORTHOPAEDIC SURGERY

## 2019-08-09 ENCOUNTER — OFFICE VISIT (OUTPATIENT)
Dept: PAIN CLINIC | Facility: HOSPITAL | Age: 74
End: 2019-08-09
Attending: ANESTHESIOLOGY
Payer: MEDICARE

## 2019-08-09 VITALS
DIASTOLIC BLOOD PRESSURE: 72 MMHG | WEIGHT: 123 LBS | RESPIRATION RATE: 18 BRPM | BODY MASS INDEX: 21 KG/M2 | HEART RATE: 81 BPM | HEIGHT: 64 IN | SYSTOLIC BLOOD PRESSURE: 137 MMHG

## 2019-08-09 DIAGNOSIS — M48.061 SPINAL STENOSIS AT L4-L5 LEVEL: ICD-10-CM

## 2019-08-09 DIAGNOSIS — M16.11 PRIMARY OSTEOARTHRITIS OF RIGHT HIP: Primary | ICD-10-CM

## 2019-08-09 PROCEDURE — 99211 OFF/OP EST MAY X REQ PHY/QHP: CPT

## 2019-08-09 NOTE — PROGRESS NOTES
08/09/19  PRESENTS AMBULATORY TO CPM;  RETURNS TODAY WITH SAME C/O RT HIP PAIN;  RATES HER PAIN 9/10;  SINCE LAST VISIT HAS HAD NEW MRI OF HE RT HIP ; WOULD LIKE TO DISCUSS POC WITH ;  SEEN BY DR. MAHMOOD;  REFER TO DICTATION FOR THE POC.

## 2019-08-09 NOTE — CHRONIC PAIN
Follow-up Note    HISTORY OF PRESENT ILLNESS:  Navin Brooks is a 76year old old female, returns to the clinic for further evaluation of her right lower back pain right radicular pain she has a planned surgical evaluation next week review of her MRI shows REPLACEMENT Right 1/18/2018    Performed by Edvin Velazquez MD at [facility] MAIN OR   • HYSTERECTOMY      partial in her 35s for endometriosis   • OTHER SURGICAL HISTORY  2018    Right hip replacement       FAMILY HISTORY:  Family History   Problem Relation Age and oriented x3  Affect:  NAD  CV: RRR, no m/r/g  Pulmonary: CTAB   Gait: Broad-based;    Spine: Scoliosis    ROM:   Lumbar spine  Flexion dec  Extension dec    Cervical Spine  ROM **    MOTOR EXAMINATION:  UPPER EXTREMITY      LEFT RIGHT   Deltoid 5/5 5/5 TISSUES: Redemonstrated left pelvic kidney. There is mild asymmetric edema involving the right adductor compartment musculature and right obturator internus. There is asymmetric fatty atrophy/volume loss of the right obturator internus, new since 2015.   [facility] session. Patient agreeable to discussion plan.  Greater than 50% of the time was spent with counseling (nature of discussion centered around pain, therapy, and treatment options), face to face time, time spent reviewing data, obtaining patient information a

## 2019-09-18 ENCOUNTER — OFFICE VISIT (OUTPATIENT)
Dept: PAIN CLINIC | Facility: HOSPITAL | Age: 74
End: 2019-09-18
Attending: NURSE PRACTITIONER
Payer: MEDICARE

## 2019-09-18 DIAGNOSIS — M16.11 PRIMARY OSTEOARTHRITIS OF RIGHT HIP: Primary | ICD-10-CM

## 2019-09-18 DIAGNOSIS — M48.061 FORAMINAL STENOSIS OF LUMBAR REGION: ICD-10-CM

## 2019-09-18 PROCEDURE — 99211 OFF/OP EST MAY X REQ PHY/QHP: CPT

## 2019-09-18 NOTE — PROGRESS NOTES
Patient presents to [facility] ambulatory for follow up HERMELINDO done 9-5. She reports 75 % pain relief in her back but her main concern is her right hip which helped 25%. She takes tylenol prn. CHAKA Jc in to see patient. See provider notes.

## 2019-09-18 NOTE — CHRONIC PAIN
Follow-up Note  CC:  Follow up Right TFESI L4-5 9/5/19  HISTORY OF PRESENT ILLNESS:  Navin Brooks is a 76year old old female, originally referred to the pain clinic by Dr. Nubia Hensley with history of Primary osteoarthritis of right hip  (primary encounter di active ulcer  Genitourinary:  Negative  Integumentary :  Negative  Psychiatric:  Negative  Hematologic: No active bleeding  Lymphatic: No current lymphedema  Allergic/Immunologic:  Negative  Musculoskeletal: As above  Neurological: As above  Denies chest p phone: Not on file        Gets together: Not on file        Attends [employer] service: Not on file        Active member of club or organization: Not on file        Attends meetings of clubs or organizations: Not on file        Relationship status: Not on f Intermittent lower back pain, right hip pain. TECHNIQUE:    A variety of imaging planes and parameters were utilized for visualization of suspected pathology. FINDINGS:          PARASPINAL AREA:     No visible mass. Incidental left pelvic kidney. to optimize visualization of suspected pathology. Images were performed without contrast.     FINDINGS:          BONES/JOINTS:            Scoliosis of the visualized spine.   Ferromagnetic susceptibility artifact related to right total hip arthroplasty res if repeat injection needed   Continue aleve  As needed

## 2020-08-07 ENCOUNTER — APPOINTMENT (OUTPATIENT)
Dept: GENERAL RADIOLOGY | Facility: HOSPITAL | Age: 75
End: 2020-08-07
Payer: MEDICARE

## 2020-08-07 ENCOUNTER — HOSPITAL ENCOUNTER (EMERGENCY)
Facility: HOSPITAL | Age: 75
Discharge: HOME | End: 2020-08-07
Attending: EMERGENCY MEDICINE
Payer: MEDICARE

## 2020-08-07 VITALS
SYSTOLIC BLOOD PRESSURE: 132 MMHG | HEART RATE: 54 BPM | HEIGHT: 63 IN | OXYGEN SATURATION: 100 % | BODY MASS INDEX: 23.04 KG/M2 | RESPIRATION RATE: 21 BRPM | TEMPERATURE: 98 F | WEIGHT: 130 LBS | DIASTOLIC BLOOD PRESSURE: 73 MMHG

## 2020-08-07 DIAGNOSIS — R07.89 CHEST PAIN, ATYPICAL: Primary | ICD-10-CM

## 2020-08-07 DIAGNOSIS — M41.80 DEXTROSCOLIOSIS: ICD-10-CM

## 2020-08-07 LAB
ALBUMIN SERPL-MCNC: 3.4 G/DL (ref 3.4–5)
ALBUMIN/GLOB SERPL: 1 {RATIO} (ref 1–2)
ALP LIVER SERPL-CCNC: 64 U/L (ref 55–142)
ALT SERPL-CCNC: 24 U/L (ref 13–56)
ANION GAP SERPL CALC-SCNC: 5 MMOL/L (ref 0–18)
AST SERPL-CCNC: 22 U/L (ref 15–37)
BASOPHILS # BLD AUTO: 0.03 X10(3) UL (ref 0–0.2)
BASOPHILS NFR BLD AUTO: 0.4 %
BILIRUB SERPL-MCNC: 0.3 MG/DL (ref 0.1–2)
BUN BLD-MCNC: 25 MG/DL (ref 7–18)
BUN/CREAT SERPL: 22.1 (ref 10–20)
CALCIUM BLD-MCNC: 8.4 MG/DL (ref 8.5–10.1)
CHLORIDE SERPL-SCNC: 111 MMOL/L (ref 98–112)
CO2 SERPL-SCNC: 26 MMOL/L (ref 21–32)
CREAT BLD-MCNC: 1.13 MG/DL (ref 0.55–1.02)
D DIMER PPP FEU-MCNC: 0.5 UG/ML FEU (ref ?–0.75)
DEPRECATED RDW RBC AUTO: 41.9 FL (ref 35.1–46.3)
EOSINOPHIL # BLD AUTO: 0.3 X10(3) UL (ref 0–0.7)
EOSINOPHIL NFR BLD AUTO: 4 %
ERYTHROCYTE [DISTWIDTH] IN BLOOD BY AUTOMATED COUNT: 12.6 % (ref 11–15)
GLOBULIN PLAS-MCNC: 3.5 G/DL (ref 2.8–4.4)
GLUCOSE BLD-MCNC: 88 MG/DL (ref 70–99)
HCT VFR BLD AUTO: 36.1 % (ref 35–48)
HGB BLD-MCNC: 12.2 G/DL (ref 12–16)
IMM GRANULOCYTES # BLD AUTO: 0.02 X10(3) UL (ref 0–1)
IMM GRANULOCYTES NFR BLD: 0.3 %
LIPASE SERPL-CCNC: 150 U/L (ref 73–393)
LYMPHOCYTES # BLD AUTO: 3.91 X10(3) UL (ref 1–4)
LYMPHOCYTES NFR BLD AUTO: 51.7 %
M PROTEIN MFR SERPL ELPH: 6.9 G/DL (ref 6.4–8.2)
MCH RBC QN AUTO: 30.7 PG (ref 26–34)
MCHC RBC AUTO-ENTMCNC: 33.8 G/DL (ref 31–37)
MCV RBC AUTO: 90.7 FL (ref 80–100)
MONOCYTES # BLD AUTO: 0.49 X10(3) UL (ref 0.1–1)
MONOCYTES NFR BLD AUTO: 6.5 %
NEUTROPHILS # BLD AUTO: 2.81 X10 (3) UL (ref 1.5–7.7)
NEUTROPHILS # BLD AUTO: 2.81 X10(3) UL (ref 1.5–7.7)
NEUTROPHILS NFR BLD AUTO: 37.1 %
NT-PROBNP SERPL-MCNC: 48 PG/ML (ref ?–450)
OSMOLALITY SERPL CALC.SUM OF ELEC: 298 MOSM/KG (ref 275–295)
PLATELET # BLD AUTO: 193 10(3)UL (ref 150–450)
POTASSIUM SERPL-SCNC: 3.9 MMOL/L (ref 3.5–5.1)
RBC # BLD AUTO: 3.98 X10(6)UL (ref 3.8–5.3)
SODIUM SERPL-SCNC: 142 MMOL/L (ref 136–145)
TROPONIN I SERPL-MCNC: <0.045 NG/ML (ref ?–0.04)
WBC # BLD AUTO: 7.6 X10(3) UL (ref 4–11)

## 2020-08-07 PROCEDURE — 93010 ELECTROCARDIOGRAM REPORT: CPT | Performed by: EMERGENCY MEDICINE

## 2020-08-07 PROCEDURE — 80053 COMPREHEN METABOLIC PANEL: CPT | Performed by: EMERGENCY MEDICINE

## 2020-08-07 PROCEDURE — 85025 COMPLETE CBC W/AUTO DIFF WBC: CPT | Performed by: EMERGENCY MEDICINE

## 2020-08-07 PROCEDURE — 85379 FIBRIN DEGRADATION QUANT: CPT | Performed by: EMERGENCY MEDICINE

## 2020-08-07 PROCEDURE — 83880 ASSAY OF NATRIURETIC PEPTIDE: CPT | Performed by: EMERGENCY MEDICINE

## 2020-08-07 PROCEDURE — 84484 ASSAY OF TROPONIN QUANT: CPT | Performed by: EMERGENCY MEDICINE

## 2020-08-07 PROCEDURE — 36415 COLL VENOUS BLD VENIPUNCTURE: CPT

## 2020-08-07 PROCEDURE — 71045 X-RAY EXAM CHEST 1 VIEW: CPT | Performed by: EMERGENCY MEDICINE

## 2020-08-07 PROCEDURE — 93005 ELECTROCARDIOGRAM TRACING: CPT

## 2020-08-07 PROCEDURE — 99284 EMERGENCY DEPT VISIT MOD MDM: CPT

## 2020-08-07 PROCEDURE — 83690 ASSAY OF LIPASE: CPT | Performed by: EMERGENCY MEDICINE

## 2020-08-07 RX ORDER — HYDROCODONE BITARTRATE AND ACETAMINOPHEN 5; 325 MG/1; MG/1
1 TABLET ORAL EVERY 6 HOURS PRN
Qty: 8 TABLET | Refills: 0 | Status: SHIPPED | OUTPATIENT
Start: 2020-08-07

## 2020-08-07 RX ORDER — GABAPENTIN 100 MG/1
100 CAPSULE ORAL 3 TIMES DAILY
COMMUNITY

## 2020-08-07 NOTE — ED PROVIDER NOTES
Patient Seen in: [facility] ([place] AND [facility] Emergency Department      History   Patient presents with:  Chest Pain Angina    Stated Complaint:     HPI    80-year-old female without documented cardiac history presents for evaluation of recurrent symptoms of burnin Physical Exam    Constitutional: Oriented to person, place, and time. Appears well-developed. No distress. Head: Normocephalic and atraumatic. Eyes: Conjunctivae are normal. Pupils are equal, round, and reactive to light.    Neck: Normal range DRAW GOLD   CBC W/ DIFFERENTIAL     EKG    Rate, intervals and axes as noted on EKG Report.   Rate: 67  Rhythm: Sinus Rhythm  Reading: No acute ischemic changes              Xr Chest Ap Portable  (cpt=71045)    Result Date: 8/7/2020  PROCEDURE: XR CHEST AP reflect that. I strongly feel that her severe thoracic scoliosis is the cause of her symptoms. She does have a spine doctor who she periodically gets injections from and call next week.   I encouraged her to follow-up with her doctor and follow-up with he

## 2020-08-07 NOTE — ED NOTES
PT safe to DC home per MD. Chad Tran to dress self. DC teaching done, pt verbalizes understanding. Ambulatory with steady gait to exit.

## 2020-08-07 NOTE — ED INITIAL ASSESSMENT (HPI)
Pt ambulatory into ER with steady gait. Pt c/o \"burning\" in her back which wraps through to her chest since the beginning of the year.  Pt has seen her PCP regarding this c/o several times recently and notes that she was told that she has a mild heart mur

## 2020-08-13 ENCOUNTER — LAB ENCOUNTER (OUTPATIENT)
Dept: LAB | Facility: HOSPITAL | Age: 75
End: 2020-08-13
Attending: INTERNAL MEDICINE
Payer: MEDICARE

## 2020-08-13 DIAGNOSIS — M05.10 RHEUMATOID LUNG (HCC): ICD-10-CM

## 2020-08-13 DIAGNOSIS — R76.0 RAISED ANTIBODY TITER: Primary | ICD-10-CM

## 2020-08-13 LAB — RHEUMATOID FACT SERPL-ACNC: <10 IU/ML (ref ?–15)

## 2020-08-13 PROCEDURE — 86039 ANTINUCLEAR ANTIBODIES (ANA): CPT

## 2020-08-13 PROCEDURE — 36415 COLL VENOUS BLD VENIPUNCTURE: CPT

## 2020-08-13 PROCEDURE — 86038 ANTINUCLEAR ANTIBODIES: CPT

## 2020-08-13 PROCEDURE — 86431 RHEUMATOID FACTOR QUANT: CPT

## 2020-08-17 LAB — NUCLEAR IGG TITR SER IF: POSITIVE {TITER}

## 2020-08-18 LAB — ANA NUCLEOLAR TITR SER IF: 320 {TITER}

## 2020-09-15 ENCOUNTER — HOSPITAL ENCOUNTER (OUTPATIENT)
Dept: MRI IMAGING | Facility: HOSPITAL | Age: 75
Discharge: HOME | End: 2020-09-15
Attending: ORTHOPAEDIC SURGERY
Payer: MEDICARE

## 2020-09-15 DIAGNOSIS — M51.36 DDD (DEGENERATIVE DISC DISEASE), LUMBAR: ICD-10-CM

## 2020-09-15 DIAGNOSIS — M41.9 SCOLIOSIS, UNSPECIFIED SCOLIOSIS TYPE, UNSPECIFIED SPINAL REGION: ICD-10-CM

## 2020-09-15 DIAGNOSIS — M51.34 DDD (DEGENERATIVE DISC DISEASE), THORACIC: ICD-10-CM

## 2020-09-15 PROCEDURE — 72148 MRI LUMBAR SPINE W/O DYE: CPT | Performed by: ORTHOPAEDIC SURGERY

## 2020-09-15 PROCEDURE — 72146 MRI CHEST SPINE W/O DYE: CPT | Performed by: ORTHOPAEDIC SURGERY

## 2021-01-06 ENCOUNTER — HOSPITAL ENCOUNTER (OUTPATIENT)
Dept: GENERAL RADIOLOGY | Facility: HOSPITAL | Age: 76
Discharge: HOME | End: 2021-01-06
Attending: INTERNAL MEDICINE
Payer: MEDICARE

## 2021-01-06 DIAGNOSIS — M41.9 SCOLIOSIS: ICD-10-CM

## 2021-01-06 PROCEDURE — 72081 X-RAY EXAM ENTIRE SPI 1 VW: CPT | Performed by: INTERNAL MEDICINE

## 2021-02-16 ENCOUNTER — HOSPITAL ENCOUNTER (OUTPATIENT)
Dept: GENERAL RADIOLOGY | Facility: HOSPITAL | Age: 76
Discharge: HOME | End: 2021-02-16
Attending: INTERNAL MEDICINE
Payer: MEDICARE

## 2021-02-16 DIAGNOSIS — R52 PAIN: ICD-10-CM

## 2021-02-16 PROCEDURE — 72050 X-RAY EXAM NECK SPINE 4/5VWS: CPT | Performed by: INTERNAL MEDICINE

## 2021-05-27 ENCOUNTER — HOSPITAL ENCOUNTER (OUTPATIENT)
Dept: MRI IMAGING | Facility: HOSPITAL | Age: 76
Discharge: HOME | End: 2021-05-27
Attending: INTERNAL MEDICINE
Payer: MEDICARE

## 2021-05-27 DIAGNOSIS — M54.12 CERVICAL RADICULOPATHY: ICD-10-CM

## 2021-05-27 PROCEDURE — 72141 MRI NECK SPINE W/O DYE: CPT | Performed by: INTERNAL MEDICINE

## 2021-07-19 ENCOUNTER — HOSPITAL ENCOUNTER (OUTPATIENT)
Dept: NUCLEAR MEDICINE | Facility: HOSPITAL | Age: 76
Discharge: HOME | End: 2021-07-19
Attending: ORTHOPAEDIC SURGERY
Payer: MEDICARE

## 2021-07-19 DIAGNOSIS — Z96.641 HISTORY OF RIGHT HIP REPLACEMENT: ICD-10-CM

## 2021-07-19 DIAGNOSIS — M25.551 PAIN OF RIGHT HIP: ICD-10-CM

## 2021-07-19 PROCEDURE — 78315 BONE IMAGING 3 PHASE: CPT | Performed by: ORTHOPAEDIC SURGERY

## 2021-08-27 ENCOUNTER — HOSPITAL ENCOUNTER (OUTPATIENT)
Dept: GENERAL RADIOLOGY | Facility: HOSPITAL | Age: 76
Discharge: HOME | End: 2021-08-27
Attending: INTERNAL MEDICINE
Payer: MEDICARE

## 2021-08-27 DIAGNOSIS — T14.8XXA BRUISE: ICD-10-CM

## 2021-08-27 PROCEDURE — 73590 X-RAY EXAM OF LOWER LEG: CPT | Performed by: INTERNAL MEDICINE

## 2021-09-28 ENCOUNTER — LAB ENCOUNTER (OUTPATIENT)
Dept: LAB | Facility: HOSPITAL | Age: 76
End: 2021-09-28
Attending: INTERNAL MEDICINE
Payer: MEDICARE

## 2021-09-28 DIAGNOSIS — D51.0 PERNICIOUS ANEMIA: Primary | ICD-10-CM

## 2021-09-28 DIAGNOSIS — R73.09 IMPAIRED GLUCOSE TOLERANCE TEST: ICD-10-CM

## 2021-09-28 DIAGNOSIS — E61.2 MAGNESIUM DEFICIENCY: ICD-10-CM

## 2021-09-28 DIAGNOSIS — R94.6 NONSPECIFIC ABNORMAL RESULTS OF THYROID FUNCTION STUDY: ICD-10-CM

## 2021-09-28 PROCEDURE — 84207 ASSAY OF VITAMIN B-6: CPT

## 2021-09-28 PROCEDURE — 82607 VITAMIN B-12: CPT

## 2021-09-28 PROCEDURE — 36415 COLL VENOUS BLD VENIPUNCTURE: CPT

## 2021-09-28 PROCEDURE — 84443 ASSAY THYROID STIM HORMONE: CPT

## 2021-09-28 PROCEDURE — 84425 ASSAY OF VITAMIN B-1: CPT

## 2021-09-28 PROCEDURE — 84436 ASSAY OF TOTAL THYROXINE: CPT

## 2021-09-28 PROCEDURE — 83036 HEMOGLOBIN GLYCOSYLATED A1C: CPT

## 2021-09-28 PROCEDURE — 83735 ASSAY OF MAGNESIUM: CPT

## 2021-11-02 ENCOUNTER — LAB ENCOUNTER (OUTPATIENT)
Dept: LAB | Facility: HOSPITAL | Age: 76
End: 2021-11-02
Attending: INTERNAL MEDICINE
Payer: MEDICARE

## 2021-11-02 DIAGNOSIS — R70.0 ELEVATED SEDIMENTATION RATE: ICD-10-CM

## 2021-11-02 DIAGNOSIS — R79.81 ABNORMAL ARTERIAL BLOOD GASES: ICD-10-CM

## 2021-11-02 DIAGNOSIS — R79.1 ABNORMAL COAGULATION PROFILE: Primary | ICD-10-CM

## 2021-11-02 PROCEDURE — 85379 FIBRIN DEGRADATION QUANT: CPT

## 2021-11-02 PROCEDURE — 86360 T CELL ABSOLUTE COUNT/RATIO: CPT

## 2021-11-02 PROCEDURE — 86140 C-REACTIVE PROTEIN: CPT

## 2021-11-02 PROCEDURE — 85652 RBC SED RATE AUTOMATED: CPT

## 2021-11-02 PROCEDURE — 36415 COLL VENOUS BLD VENIPUNCTURE: CPT

## 2021-12-30 ENCOUNTER — LAB ENCOUNTER (OUTPATIENT)
Dept: LAB | Facility: HOSPITAL | Age: 76
End: 2021-12-30
Attending: INTERNAL MEDICINE
Payer: MEDICARE

## 2021-12-30 ENCOUNTER — HOSPITAL ENCOUNTER (OUTPATIENT)
Dept: GENERAL RADIOLOGY | Facility: HOSPITAL | Age: 76
Discharge: HOME | End: 2021-12-30
Attending: INTERNAL MEDICINE
Payer: MEDICARE

## 2021-12-30 DIAGNOSIS — J39.8 CONGESTION OF UPPER RESPIRATORY TRACT: ICD-10-CM

## 2021-12-30 DIAGNOSIS — Z11.4 SCREENING FOR HUMAN IMMUNODEFICIENCY VIRUS: Primary | ICD-10-CM

## 2021-12-30 PROCEDURE — 36415 COLL VENOUS BLD VENIPUNCTURE: CPT

## 2021-12-30 PROCEDURE — 71046 X-RAY EXAM CHEST 2 VIEWS: CPT | Performed by: INTERNAL MEDICINE

## 2021-12-30 PROCEDURE — 86360 T CELL ABSOLUTE COUNT/RATIO: CPT

## 2022-01-01 LAB
CD3+CD4+ CELLS # BLD: 1100 /MM3
CD3+CD4+ CELLS NFR BLD: 22 %
CD3+CD4+ CELLS/CD3+CD8+ CLL SPEC: 2
CD3+CD8+ CELLS NFR SPEC: 11 %

## 2022-01-31 ENCOUNTER — LAB ENCOUNTER (OUTPATIENT)
Dept: LAB | Facility: HOSPITAL | Age: 77
End: 2022-01-31
Attending: ALLERGY & IMMUNOLOGY
Payer: MEDICARE

## 2022-01-31 DIAGNOSIS — D84.9 IMMUNODEFICIENCY, UNSPECIFIED (HCC): Primary | ICD-10-CM

## 2022-01-31 LAB
BASOPHILS # BLD AUTO: 0.02 X10(3) UL (ref 0–0.2)
BASOPHILS NFR BLD AUTO: 0.3 %
BILIRUB UR QL: NEGATIVE
COLOR UR: YELLOW
DEPRECATED RDW RBC AUTO: 44.2 FL (ref 35.1–46.3)
EOSINOPHIL # BLD AUTO: 0.11 X10(3) UL (ref 0–0.7)
EOSINOPHIL NFR BLD AUTO: 1.8 %
ERYTHROCYTE [DISTWIDTH] IN BLOOD BY AUTOMATED COUNT: 12.6 % (ref 11–15)
GLUCOSE UR-MCNC: NEGATIVE MG/DL
HCT VFR BLD AUTO: 39 %
HGB BLD-MCNC: 12.1 G/DL
HGB UR QL STRIP.AUTO: NEGATIVE
IGA SERPL-MCNC: 265 MG/DL (ref 70–312)
IGM SERPL-MCNC: 56.8 MG/DL (ref 43–279)
IMM GRANULOCYTES # BLD AUTO: 0.01 X10(3) UL (ref 0–1)
IMM GRANULOCYTES NFR BLD: 0.2 %
IMMUNOGLOBULIN PNL SER-MCNC: 809 MG/DL (ref 791–1643)
LEUKOCYTE ESTERASE UR QL STRIP.AUTO: NEGATIVE
LYMPHOCYTES # BLD AUTO: 3.64 X10(3) UL (ref 1–4)
LYMPHOCYTES NFR BLD AUTO: 59.7 %
MCH RBC QN AUTO: 29.5 PG (ref 26–34)
MCHC RBC AUTO-ENTMCNC: 31 G/DL (ref 31–37)
MCV RBC AUTO: 95.1 FL
MONOCYTES # BLD AUTO: 0.36 X10(3) UL (ref 0.1–1)
MONOCYTES NFR BLD AUTO: 5.9 %
NEUTROPHILS # BLD AUTO: 1.96 X10 (3) UL (ref 1.5–7.7)
NEUTROPHILS # BLD AUTO: 1.96 X10(3) UL (ref 1.5–7.7)
NEUTROPHILS NFR BLD AUTO: 32.1 %
NITRITE UR QL STRIP.AUTO: NEGATIVE
PLATELET # BLD AUTO: 199 10(3)UL (ref 150–450)
PROT SERPL-MCNC: 6.5 G/DL (ref 6.4–8.2)
PROT UR-MCNC: 30 MG/DL
RBC # BLD AUTO: 4.1 X10(6)UL
SP GR UR STRIP: 1.03 (ref 1–1.03)
UROBILINOGEN UR STRIP-ACNC: 2
WBC # BLD AUTO: 6.1 X10(3) UL (ref 4–11)

## 2022-01-31 PROCEDURE — 84165 PROTEIN E-PHORESIS SERUM: CPT

## 2022-01-31 PROCEDURE — 86355 B CELLS TOTAL COUNT: CPT

## 2022-01-31 PROCEDURE — 86774 TETANUS ANTIBODY: CPT

## 2022-01-31 PROCEDURE — 81001 URINALYSIS AUTO W/SCOPE: CPT

## 2022-01-31 PROCEDURE — 82784 ASSAY IGA/IGD/IGG/IGM EACH: CPT

## 2022-01-31 PROCEDURE — 85025 COMPLETE CBC W/AUTO DIFF WBC: CPT

## 2022-01-31 PROCEDURE — 86360 T CELL ABSOLUTE COUNT/RATIO: CPT

## 2022-01-31 PROCEDURE — 86359 T CELLS TOTAL COUNT: CPT

## 2022-01-31 PROCEDURE — 36415 COLL VENOUS BLD VENIPUNCTURE: CPT

## 2022-01-31 PROCEDURE — 82785 ASSAY OF IGE: CPT

## 2022-01-31 PROCEDURE — 86334 IMMUNOFIX E-PHORESIS SERUM: CPT

## 2022-01-31 PROCEDURE — 86317 IMMUNOASSAY INFECTIOUS AGENT: CPT

## 2022-01-31 PROCEDURE — 86357 NK CELLS TOTAL COUNT: CPT

## 2022-01-31 PROCEDURE — 82787 IGG 1 2 3 OR 4 EACH: CPT

## 2022-01-31 PROCEDURE — 84155 ASSAY OF PROTEIN SERUM: CPT

## 2022-02-01 LAB
CD19 CELLS NFR SPEC: 57 %
CD3 CELLS NFR SPEC: 32 %
CD3+CD4+ CELLS # BLD: 778 /MM3
CD3+CD4+ CELLS NFR BLD: 21 %
CD3+CD4+ CELLS/CD3+CD8+ CLL SPEC: 2.1
CD3+CD8+ CELLS NFR SPEC: 10 %
CD3-CD16+CD56+ CELLS NFR SPEC: 10 %

## 2022-02-02 LAB
IMMUNOGLOBULIN G SUBCLASS 1: 369 MG/DL
IMMUNOGLOBULIN G SUBCLASS 2: 250 MG/DL
IMMUNOGLOBULIN G SUBCLASS 3: 79 MG/DL
IMMUNOGLOBULIN G SUBCLASS 4: 6 MG/DL

## 2022-02-03 LAB — TETANUS ANTIBODY, IGG: 0 IU/ML

## 2022-02-04 LAB
PNEUMOCOCCAL SEROTYPE 1 IGG: 2.49 UG/ML
PNEUMOCOCCAL SEROTYPE 10A IGG: 3.68 UG/ML
PNEUMOCOCCAL SEROTYPE 11A IGG: 1.17 UG/ML
PNEUMOCOCCAL SEROTYPE 14* IGG: 0.56 UG/ML
PNEUMOCOCCAL SEROTYPE 15B IGG: 0.66 UG/ML
PNEUMOCOCCAL SEROTYPE 17F IGG: 6.26 UG/ML
PNEUMOCOCCAL SEROTYPE 18C* IGG: 0.72 UG/ML
PNEUMOCOCCAL SEROTYPE 19A IGG: 15.43 UG/ML
PNEUMOCOCCAL SEROTYPE 2 IGG: 8.58 UG/ML
PNEUMOCOCCAL SEROTYPE 20 IGG: 3.17 UG/ML
PNEUMOCOCCAL SEROTYPE 22F IGG: 0.58 UG/ML
PNEUMOCOCCAL SEROTYPE 23F* IGG: 0.14 UG/ML
PNEUMOCOCCAL SEROTYPE 3 IGG: 0.55 UG/ML
PNEUMOCOCCAL SEROTYPE 33F IGG: 8.58 UG/ML
PNEUMOCOCCAL SEROTYPE 4* IGG: 0.1 UG/ML
PNEUMOCOCCAL SEROTYPE 5 IGG: 1.11 UG/ML
PNEUMOCOCCAL SEROTYPE 6B* IGG: 1.75 UG/ML
PNEUMOCOCCAL SEROTYPE 7F IGG: 2.65 UG/ML
PNEUMOCOCCAL SEROTYPE 8 IGG: 0.19 UG/ML
PNEUMOCOCCAL SEROTYPE 9N IGG: 0.26 UG/ML
PNEUMOCOCCAL SEROTYPE 9V*, IGG: 0.86 UG/ML

## 2022-02-07 LAB — IGE SERPL-ACNC: 4.33 KU/L (ref 2–214)

## 2022-02-17 LAB
ALBUMIN SERPL ELPH-MCNC: 4.02 G/DL (ref 3.75–5.21)
ALBUMIN/GLOB SERPL: 1.62 {RATIO} (ref 1–2)
ALPHA1 GLOB SERPL ELPH-MCNC: 0.31 G/DL (ref 0.19–0.46)
ALPHA2 GLOB SERPL ELPH-MCNC: 0.64 G/DL (ref 0.48–1.05)
B-GLOBULIN SERPL ELPH-MCNC: 0.81 G/DL (ref 0.68–1.23)
GAMMA GLOB SERPL ELPH-MCNC: 0.72 G/DL (ref 0.62–1.7)
PROT SERPL-MCNC: 6.5 G/DL (ref 6.4–8.2)

## 2022-03-15 ENCOUNTER — HOSPITAL ENCOUNTER (EMERGENCY)
Facility: HOSPITAL | Age: 77
Discharge: HOME | End: 2022-03-15
Attending: EMERGENCY MEDICINE
Payer: MEDICARE

## 2022-03-15 ENCOUNTER — APPOINTMENT (OUTPATIENT)
Dept: GENERAL RADIOLOGY | Facility: HOSPITAL | Age: 77
End: 2022-03-15
Attending: EMERGENCY MEDICINE
Payer: MEDICARE

## 2022-03-15 VITALS
HEIGHT: 63 IN | WEIGHT: 130 LBS | TEMPERATURE: 98 F | RESPIRATION RATE: 18 BRPM | DIASTOLIC BLOOD PRESSURE: 69 MMHG | SYSTOLIC BLOOD PRESSURE: 171 MMHG | OXYGEN SATURATION: 96 % | HEART RATE: 87 BPM | BODY MASS INDEX: 23.04 KG/M2

## 2022-03-15 DIAGNOSIS — R07.9 CHEST PAIN OF UNCERTAIN ETIOLOGY: Primary | ICD-10-CM

## 2022-03-15 DIAGNOSIS — M25.559 HIP PAIN: ICD-10-CM

## 2022-03-15 LAB
ANION GAP SERPL CALC-SCNC: 6 MMOL/L (ref 0–18)
BASOPHILS # BLD AUTO: 0.03 X10(3) UL (ref 0–0.2)
BASOPHILS NFR BLD AUTO: 0.4 %
BUN BLD-MCNC: 16 MG/DL (ref 7–18)
BUN/CREAT SERPL: 17.4 (ref 10–20)
CALCIUM BLD-MCNC: 9.2 MG/DL (ref 8.5–10.1)
CHLORIDE SERPL-SCNC: 109 MMOL/L (ref 98–112)
CO2 SERPL-SCNC: 27 MMOL/L (ref 21–32)
CREAT BLD-MCNC: 0.92 MG/DL
DEPRECATED RDW RBC AUTO: 43.1 FL (ref 35.1–46.3)
EOSINOPHIL # BLD AUTO: 0.19 X10(3) UL (ref 0–0.7)
EOSINOPHIL NFR BLD AUTO: 2.8 %
ERYTHROCYTE [DISTWIDTH] IN BLOOD BY AUTOMATED COUNT: 12.3 % (ref 11–15)
GLUCOSE BLD-MCNC: 86 MG/DL (ref 70–99)
HCT VFR BLD AUTO: 41.3 %
HGB BLD-MCNC: 13.1 G/DL
IMM GRANULOCYTES # BLD AUTO: 0.01 X10(3) UL (ref 0–1)
IMM GRANULOCYTES NFR BLD: 0.1 %
LYMPHOCYTES # BLD AUTO: 4.53 X10(3) UL (ref 1–4)
LYMPHOCYTES NFR BLD AUTO: 67.9 %
MCH RBC QN AUTO: 30.2 PG (ref 26–34)
MCHC RBC AUTO-ENTMCNC: 31.7 G/DL (ref 31–37)
MCV RBC AUTO: 95.2 FL
MONOCYTES # BLD AUTO: 0.46 X10(3) UL (ref 0.1–1)
MONOCYTES NFR BLD AUTO: 6.9 %
NEUTROPHILS # BLD AUTO: 1.45 X10 (3) UL (ref 1.5–7.7)
NEUTROPHILS # BLD AUTO: 1.45 X10(3) UL (ref 1.5–7.7)
NEUTROPHILS NFR BLD AUTO: 21.9 %
OSMOLALITY SERPL CALC.SUM OF ELEC: 294 MOSM/KG (ref 275–295)
PLATELET # BLD AUTO: 201 10(3)UL (ref 150–450)
POTASSIUM SERPL-SCNC: 4.1 MMOL/L (ref 3.5–5.1)
RBC # BLD AUTO: 4.34 X10(6)UL
SODIUM SERPL-SCNC: 142 MMOL/L (ref 136–145)
TROPONIN I HIGH SENSITIVITY: 4 NG/L
WBC # BLD AUTO: 6.7 X10(3) UL (ref 4–11)

## 2022-03-15 PROCEDURE — 71045 X-RAY EXAM CHEST 1 VIEW: CPT | Performed by: EMERGENCY MEDICINE

## 2022-03-15 PROCEDURE — 85060 BLOOD SMEAR INTERPRETATION: CPT | Performed by: EMERGENCY MEDICINE

## 2022-03-15 PROCEDURE — 96374 THER/PROPH/DIAG INJ IV PUSH: CPT

## 2022-03-15 PROCEDURE — 85025 COMPLETE CBC W/AUTO DIFF WBC: CPT | Performed by: EMERGENCY MEDICINE

## 2022-03-15 PROCEDURE — 80048 BASIC METABOLIC PNL TOTAL CA: CPT | Performed by: EMERGENCY MEDICINE

## 2022-03-15 PROCEDURE — 99285 EMERGENCY DEPT VISIT HI MDM: CPT

## 2022-03-15 PROCEDURE — 93010 ELECTROCARDIOGRAM REPORT: CPT | Performed by: EMERGENCY MEDICINE

## 2022-03-15 PROCEDURE — 84484 ASSAY OF TROPONIN QUANT: CPT | Performed by: EMERGENCY MEDICINE

## 2022-03-15 PROCEDURE — 93005 ELECTROCARDIOGRAM TRACING: CPT

## 2022-03-15 RX ORDER — ACETAMINOPHEN AND CODEINE PHOSPHATE 300; 30 MG/1; MG/1
1 TABLET ORAL EVERY 6 HOURS PRN
Qty: 10 TABLET | Refills: 0 | Status: SHIPPED | OUTPATIENT
Start: 2022-03-15 | End: 2022-03-20

## 2022-03-15 RX ORDER — MORPHINE SULFATE 2 MG/ML
2 INJECTION, SOLUTION INTRAMUSCULAR; INTRAVENOUS ONCE
Status: COMPLETED | OUTPATIENT
Start: 2022-03-15 | End: 2022-03-15

## 2022-03-15 NOTE — ED INITIAL ASSESSMENT (HPI)
Pt c/o right hip pain since 2018. Pt states she was told she has a tear there. And states she takes gabapentin and it's not helping. Pt states the hip pain is causing body pain. Pt states pain is causing her high bp.

## 2022-04-04 ENCOUNTER — OFFICE VISIT (OUTPATIENT)
Dept: ORTHOPEDICS | Age: 77
End: 2022-04-04

## 2022-04-04 ENCOUNTER — IMAGING SERVICES (OUTPATIENT)
Dept: GENERAL RADIOLOGY | Age: 77
End: 2022-04-04
Attending: ORTHOPAEDIC SURGERY

## 2022-04-04 VITALS — WEIGHT: 130 LBS | HEIGHT: 63 IN | BODY MASS INDEX: 23.04 KG/M2

## 2022-04-04 DIAGNOSIS — M25.551 RIGHT HIP PAIN: ICD-10-CM

## 2022-04-04 DIAGNOSIS — Z96.641 STATUS POST TOTAL REPLACEMENT OF RIGHT HIP: ICD-10-CM

## 2022-04-04 DIAGNOSIS — M25.551 RIGHT HIP PAIN: Primary | ICD-10-CM

## 2022-04-04 DIAGNOSIS — M70.61 TROCHANTERIC BURSITIS OF RIGHT HIP: ICD-10-CM

## 2022-04-04 PROCEDURE — 73502 X-RAY EXAM HIP UNI 2-3 VIEWS: CPT | Performed by: ORTHOPAEDIC SURGERY

## 2022-04-04 PROCEDURE — 20610 DRAIN/INJ JOINT/BURSA W/O US: CPT | Performed by: ORTHOPAEDIC SURGERY

## 2022-04-04 PROCEDURE — 99204 OFFICE O/P NEW MOD 45 MIN: CPT | Performed by: ORTHOPAEDIC SURGERY

## 2022-04-04 RX ORDER — GABAPENTIN 100 MG/1
CAPSULE ORAL
COMMUNITY
Start: 2022-02-28

## 2022-04-04 RX ADMIN — TRIAMCINOLONE ACETONIDE 80 MG: 40 INJECTION, SUSPENSION INTRA-ARTICULAR; INTRAMUSCULAR at 15:44

## 2022-04-04 RX ADMIN — BUPIVACAINE HYDROCHLORIDE 4 ML: 5 INJECTION, SOLUTION PERINEURAL at 15:44

## 2022-04-04 RX ADMIN — LIDOCAINE HYDROCHLORIDE 4 ML: 10 INJECTION, SOLUTION INFILTRATION; PERINEURAL at 15:44

## 2022-04-05 RX ORDER — TRIAMCINOLONE ACETONIDE 40 MG/ML
80 INJECTION, SUSPENSION INTRA-ARTICULAR; INTRAMUSCULAR
Status: COMPLETED | OUTPATIENT
Start: 2022-04-04 | End: 2022-04-04

## 2022-04-05 RX ORDER — LIDOCAINE HYDROCHLORIDE 10 MG/ML
4 INJECTION, SOLUTION INFILTRATION; PERINEURAL
Status: COMPLETED | OUTPATIENT
Start: 2022-04-04 | End: 2022-04-04

## 2022-04-05 RX ORDER — BUPIVACAINE HYDROCHLORIDE 5 MG/ML
4 INJECTION, SOLUTION PERINEURAL
Status: COMPLETED | OUTPATIENT
Start: 2022-04-04 | End: 2022-04-04

## 2022-05-17 ENCOUNTER — ORDER TRANSCRIPTION (OUTPATIENT)
Dept: PAIN CLINIC | Facility: HOSPITAL | Age: 77
End: 2022-05-17

## 2022-05-17 DIAGNOSIS — M51.34 DDD (DEGENERATIVE DISC DISEASE), THORACIC: ICD-10-CM

## 2022-05-17 DIAGNOSIS — M51.36 DDD (DEGENERATIVE DISC DISEASE), LUMBAR: Primary | ICD-10-CM

## 2022-05-19 ENCOUNTER — IMAGING SERVICES (OUTPATIENT)
Dept: OTHER | Age: 77
End: 2022-05-19

## 2022-05-19 ENCOUNTER — HOSPITAL ENCOUNTER (OUTPATIENT)
Dept: MRI IMAGING | Facility: HOSPITAL | Age: 77
Discharge: HOME | End: 2022-05-19
Attending: INTERNAL MEDICINE
Payer: MEDICARE

## 2022-05-19 DIAGNOSIS — M87.00 AVASCULAR NECROSIS (HCC): ICD-10-CM

## 2022-05-19 PROCEDURE — 73721 MRI JNT OF LWR EXTRE W/O DYE: CPT | Performed by: INTERNAL MEDICINE

## 2022-05-24 ENCOUNTER — OFFICE VISIT (OUTPATIENT)
Dept: PAIN CLINIC | Facility: HOSPITAL | Age: 77
End: 2022-05-24
Attending: ANESTHESIOLOGY
Payer: MEDICARE

## 2022-05-24 VITALS
HEIGHT: 64 IN | RESPIRATION RATE: 18 BRPM | BODY MASS INDEX: 21 KG/M2 | WEIGHT: 123 LBS | DIASTOLIC BLOOD PRESSURE: 62 MMHG | SYSTOLIC BLOOD PRESSURE: 111 MMHG | HEART RATE: 66 BPM

## 2022-05-24 DIAGNOSIS — M51.36 DDD (DEGENERATIVE DISC DISEASE), LUMBAR: ICD-10-CM

## 2022-05-24 DIAGNOSIS — M51.34 DDD (DEGENERATIVE DISC DISEASE), THORACIC: ICD-10-CM

## 2022-05-24 DIAGNOSIS — M48.061 FORAMINAL STENOSIS OF LUMBAR REGION: Primary | ICD-10-CM

## 2022-05-24 DIAGNOSIS — M41.25 OTHER IDIOPATHIC SCOLIOSIS, THORACOLUMBAR REGION: Chronic | ICD-10-CM

## 2022-05-24 PROBLEM — M41.9 SCOLIOSIS: Chronic | Status: ACTIVE | Noted: 2022-05-24

## 2022-05-24 PROCEDURE — 99202 OFFICE O/P NEW SF 15 MIN: CPT

## 2022-05-24 NOTE — PROGRESS NOTES
5/24/2022-Presents ambulatory to [facility]; NEW CONSULT C/O LBP AND LT UPPER BACK-TRAPEZIUS AREA; Pt reports this has been on going 4YRS;  Pt has hx with [facility]; last seen 2019; has had injections in the past; today  Referred by Dr. Romeo Ray for TPI; pt has hx of rt hip replacement 2018  NIKOLEIT-and has had hip pain ever since; examined by Dr. Na Fernández; refer to dictation for the plan of care.

## 2022-05-25 ENCOUNTER — DOCUMENTATION ONLY (OUTPATIENT)
Dept: PAIN CLINIC | Facility: HOSPITAL | Age: 77
End: 2022-05-25

## 2022-05-25 NOTE — PROGRESS NOTES
Procedure code [facility] [address]--- called at 10:14am at # [phone] with Hakeem Stokes, all information and clinicals given over the phone   Case has been approved.    auth # [id number]  Order form faxed to [facility]

## 2022-06-06 ENCOUNTER — OFFICE VISIT (OUTPATIENT)
Dept: ORTHOPEDICS | Age: 77
End: 2022-06-06

## 2022-06-06 ENCOUNTER — TELEPHONE (OUTPATIENT)
Dept: ORTHOPEDICS | Age: 77
End: 2022-06-06

## 2022-06-06 DIAGNOSIS — Z96.641 STATUS POST TOTAL REPLACEMENT OF RIGHT HIP: Primary | ICD-10-CM

## 2022-06-06 PROCEDURE — 99214 OFFICE O/P EST MOD 30 MIN: CPT | Performed by: ORTHOPAEDIC SURGERY

## 2022-06-06 RX ORDER — METHYLPREDNISOLONE 4 MG/1
4 TABLET ORAL SEE ADMIN INSTRUCTIONS
Qty: 21 TABLET | Refills: 0 | Status: SHIPPED | OUTPATIENT
Start: 2022-06-06

## 2022-06-06 RX ORDER — METHYLPREDNISOLONE 4 MG/1
4 TABLET ORAL SEE ADMIN INSTRUCTIONS
Qty: 21 TABLET | Refills: 0 | Status: CANCELLED | OUTPATIENT
Start: 2022-06-06

## 2022-07-11 ENCOUNTER — LAB ENCOUNTER (OUTPATIENT)
Dept: LAB | Facility: HOSPITAL | Age: 77
End: 2022-07-11
Attending: ORTHOPAEDIC SURGERY
Payer: MEDICARE

## 2022-07-11 DIAGNOSIS — Z96.641 PRESENCE OF RIGHT ARTIFICIAL HIP JOINT: Primary | ICD-10-CM

## 2022-07-11 LAB
BASOPHILS # BLD AUTO: 0.03 X10(3) UL (ref 0–0.2)
BASOPHILS NFR BLD AUTO: 0.5 %
CRP SERPL-MCNC: <0.29 MG/DL (ref ?–0.3)
DEPRECATED RDW RBC AUTO: 44.1 FL (ref 35.1–46.3)
EOSINOPHIL # BLD AUTO: 0.11 X10(3) UL (ref 0–0.7)
EOSINOPHIL NFR BLD AUTO: 1.8 %
ERYTHROCYTE [DISTWIDTH] IN BLOOD BY AUTOMATED COUNT: 12.7 % (ref 11–15)
ERYTHROCYTE [SEDIMENTATION RATE] IN BLOOD: 10 MM/HR
HCT VFR BLD AUTO: 39.3 %
HGB BLD-MCNC: 12.6 G/DL
IMM GRANULOCYTES # BLD AUTO: 0.01 X10(3) UL (ref 0–1)
IMM GRANULOCYTES NFR BLD: 0.2 %
LYMPHOCYTES # BLD AUTO: 4.17 X10(3) UL (ref 1–4)
LYMPHOCYTES NFR BLD AUTO: 66.9 %
MCH RBC QN AUTO: 30.2 PG (ref 26–34)
MCHC RBC AUTO-ENTMCNC: 32.1 G/DL (ref 31–37)
MCV RBC AUTO: 94.2 FL
MONOCYTES # BLD AUTO: 0.42 X10(3) UL (ref 0.1–1)
MONOCYTES NFR BLD AUTO: 6.7 %
NEUTROPHILS # BLD AUTO: 1.49 X10 (3) UL (ref 1.5–7.7)
NEUTROPHILS # BLD AUTO: 1.49 X10(3) UL (ref 1.5–7.7)
NEUTROPHILS NFR BLD AUTO: 23.9 %
PLATELET # BLD AUTO: 216 10(3)UL (ref 150–450)
RBC # BLD AUTO: 4.17 X10(6)UL
WBC # BLD AUTO: 6.2 X10(3) UL (ref 4–11)

## 2022-07-11 PROCEDURE — 36415 COLL VENOUS BLD VENIPUNCTURE: CPT

## 2022-07-11 PROCEDURE — 85652 RBC SED RATE AUTOMATED: CPT

## 2022-07-11 PROCEDURE — 86140 C-REACTIVE PROTEIN: CPT

## 2022-07-11 PROCEDURE — 85025 COMPLETE CBC W/AUTO DIFF WBC: CPT

## 2022-07-14 ENCOUNTER — HOSPITAL ENCOUNTER (OUTPATIENT)
Dept: NUCLEAR MEDICINE | Facility: HOSPITAL | Age: 77
Discharge: HOME | End: 2022-07-14
Attending: ORTHOPAEDIC SURGERY
Payer: MEDICARE

## 2022-07-14 DIAGNOSIS — Z96.641 PRESENCE OF RIGHT ARTIFICIAL HIP JOINT: ICD-10-CM

## 2022-07-14 PROCEDURE — 78315 BONE IMAGING 3 PHASE: CPT | Performed by: ORTHOPAEDIC SURGERY

## 2022-07-20 ENCOUNTER — OFFICE VISIT (OUTPATIENT)
Dept: PAIN CLINIC | Facility: HOSPITAL | Age: 77
End: 2022-07-20
Attending: ANESTHESIOLOGY
Payer: MEDICARE

## 2022-07-20 VITALS — OXYGEN SATURATION: 98 % | DIASTOLIC BLOOD PRESSURE: 68 MMHG | SYSTOLIC BLOOD PRESSURE: 115 MMHG | HEART RATE: 74 BPM

## 2022-07-20 DIAGNOSIS — M51.34 DDD (DEGENERATIVE DISC DISEASE), THORACIC: ICD-10-CM

## 2022-07-20 DIAGNOSIS — M51.36 DDD (DEGENERATIVE DISC DISEASE), LUMBAR: ICD-10-CM

## 2022-07-20 DIAGNOSIS — M79.18 MYOFASCIAL PAIN: Primary | ICD-10-CM

## 2022-07-20 DIAGNOSIS — M41.25 OTHER IDIOPATHIC SCOLIOSIS, THORACOLUMBAR REGION: ICD-10-CM

## 2022-07-20 DIAGNOSIS — M54.50 LOW BACK PAIN AT MULTIPLE SITES: ICD-10-CM

## 2022-07-20 DIAGNOSIS — M48.061 FORAMINAL STENOSIS OF LUMBAR REGION: ICD-10-CM

## 2022-07-20 DIAGNOSIS — S46.812A TRAPEZIUS MUSCLE STRAIN, LEFT, INITIAL ENCOUNTER: ICD-10-CM

## 2022-07-20 PROCEDURE — 20552 NJX 1/MLT TRIGGER POINT 1/2: CPT

## 2022-07-20 PROCEDURE — 99211 OFF/OP EST MAY X REQ PHY/QHP: CPT

## 2022-07-20 RX ORDER — BUPIVACAINE HYDROCHLORIDE 2.5 MG/ML
10 INJECTION, SOLUTION EPIDURAL; INFILTRATION; INTRACAUDAL ONCE
Status: ACTIVE | OUTPATIENT
Start: 2022-07-20

## 2022-07-20 RX ORDER — METHYLPREDNISOLONE ACETATE 40 MG/ML
40 INJECTION, SUSPENSION INTRA-ARTICULAR; INTRALESIONAL; INTRAMUSCULAR; SOFT TISSUE ONCE
Status: ACTIVE | OUTPATIENT
Start: 2022-07-20

## 2022-07-20 NOTE — PROGRESS NOTES
PT presents ambulatory to the [facility].  Pt reports about 20% improvement. Pt is still has 8/10 Right sided back and hip pain with difficultly walking that can cause her pain to increase and causes High blood pressure that brings her to the Er. Dr. Narayan Dominguez saw PT for caudal F/U. Refer to dictation for POC.

## 2022-08-01 ENCOUNTER — HOSPITAL ENCOUNTER (OUTPATIENT)
Dept: GENERAL RADIOLOGY | Facility: HOSPITAL | Age: 77
Discharge: HOME | End: 2022-08-01
Attending: INTERNAL MEDICINE
Payer: MEDICARE

## 2022-08-01 DIAGNOSIS — J44.9 COPD (CHRONIC OBSTRUCTIVE PULMONARY DISEASE) (HCC): ICD-10-CM

## 2022-08-01 PROCEDURE — 71046 X-RAY EXAM CHEST 2 VIEWS: CPT | Performed by: INTERNAL MEDICINE

## 2022-10-18 ENCOUNTER — HOSPITAL ENCOUNTER (OUTPATIENT)
Dept: CT IMAGING | Facility: HOSPITAL | Age: 77
Discharge: HOME | End: 2022-10-18
Attending: NURSE PRACTITIONER
Payer: MEDICARE

## 2022-10-18 DIAGNOSIS — M41.125 ADOLESCENT IDIOPATHIC SCOLIOSIS OF THORACOLUMBAR REGION: ICD-10-CM

## 2022-10-18 PROCEDURE — 72131 CT LUMBAR SPINE W/O DYE: CPT | Performed by: NURSE PRACTITIONER

## 2022-10-18 PROCEDURE — 72128 CT CHEST SPINE W/O DYE: CPT | Performed by: NURSE PRACTITIONER

## 2022-10-28 ENCOUNTER — HOSPITAL ENCOUNTER (OUTPATIENT)
Dept: MRI IMAGING | Age: 77
Discharge: HOME | End: 2022-10-28
Attending: NURSE PRACTITIONER
Payer: MEDICARE

## 2022-10-28 ENCOUNTER — HOSPITAL ENCOUNTER (OUTPATIENT)
Dept: BONE DENSITY | Age: 77
Discharge: HOME | End: 2022-10-28
Attending: NURSE PRACTITIONER
Payer: MEDICARE

## 2022-10-28 DIAGNOSIS — M81.0 OSTEOPOROSIS: ICD-10-CM

## 2022-10-28 DIAGNOSIS — M41.125 ADOLESCENT IDIOPATHIC SCOLIOSIS OF THORACOLUMBAR REGION: ICD-10-CM

## 2022-10-28 PROCEDURE — 72146 MRI CHEST SPINE W/O DYE: CPT | Performed by: NURSE PRACTITIONER

## 2022-10-28 PROCEDURE — 77080 DXA BONE DENSITY AXIAL: CPT | Performed by: NURSE PRACTITIONER

## 2022-10-28 PROCEDURE — 72148 MRI LUMBAR SPINE W/O DYE: CPT | Performed by: NURSE PRACTITIONER

## 2022-11-22 DIAGNOSIS — M41.125 ADOLESCENT IDIOPATHIC SCOLIOSIS OF THORACOLUMBAR REGION: Primary | ICD-10-CM

## 2022-11-22 DIAGNOSIS — M48.061 SPINAL STENOSIS OF LUMBAR REGION, UNSPECIFIED WHETHER NEUROGENIC CLAUDICATION PRESENT: ICD-10-CM

## 2022-11-23 ENCOUNTER — OFFICE VISIT (OUTPATIENT)
Dept: PAIN CLINIC | Facility: HOSPITAL | Age: 77
End: 2022-11-23
Attending: ANESTHESIOLOGY
Payer: MEDICARE

## 2022-11-23 VITALS
HEART RATE: 68 BPM | RESPIRATION RATE: 18 BRPM | SYSTOLIC BLOOD PRESSURE: 119 MMHG | HEIGHT: 64 IN | DIASTOLIC BLOOD PRESSURE: 71 MMHG | BODY MASS INDEX: 21 KG/M2 | WEIGHT: 123 LBS

## 2022-11-23 DIAGNOSIS — M41.25 OTHER IDIOPATHIC SCOLIOSIS, THORACOLUMBAR REGION: Primary | Chronic | ICD-10-CM

## 2022-11-23 PROCEDURE — 99211 OFF/OP EST MAY X REQ PHY/QHP: CPT

## 2022-11-23 NOTE — PROGRESS NOTES
11/23/2022-returns to [facility] to reestablish care;  last seen 7/20/22; hx of  LB & UPPER BACK PAIN also RT HIP PAIN; last injection 5/31/22-CAUDAL; Today c/o RT HIP PAIN 8/10; pt  has been to [employer]. Multiple test mri's & ct's; referred to [facility] for LESI L4/5; she is to call psot injection to let them know if she is still having pain or not;  Seen by Dr. Angelo Leventhal; refer to dictation for the plan of care.

## 2022-11-25 ENCOUNTER — TELEPHONE (OUTPATIENT)
Dept: PAIN CLINIC | Facility: HOSPITAL | Age: 77
End: 2022-11-25

## 2022-11-25 NOTE — TELEPHONE ENCOUNTER
Diagnostic Lumbar JOSE EDUARDO L4/5 - Cpt 84986 - Dx M41.25 - NO PA REQUIRED  Decision ID # [id number]  Status: Authorization is not required per health plan however, may be subject to review once claim is submitted  Per Medicare Guidelines; request is a covered benefit and pre-certification is not require. All Medicare coverage is based on Medical Necessity.

## 2022-11-25 NOTE — TELEPHONE ENCOUNTER
Pt scheduled for procedure on 12/07/22    2 wk follow-up appointment scheduled on 12/22/22 at 9:20 am .    Surgery Scheduling Form faxed to [facility] at [phone].

## 2022-12-07 ENCOUNTER — SURGERY CENTER DOCUMENTATION (OUTPATIENT)
Dept: SURGERY | Age: 77
End: 2022-12-07

## 2022-12-07 NOTE — PROCEDURES
[address]            Patient: Rebecca Dan  MR #: [id number]  : 7/15/1945        Operative Report        Date of procedure: 2022    Preoperative diagnosis: DDD/ Scoliosis R leg radiculopathy  Postop diagnosis:DDD/ Scoliosis R leg radiculopathy        Procedure:  L4/5   Lumbar epidural steroid injection fluoroscopic guidance and  image saved    Surgeon: Sarah Mcneil. Emelyn Wilkinson MD    Indications: 68year old female with DDD/ Scoliosis R leg radiculopathy      Operative procedure:   Patient was brought to the procedure room suite and placed in the prone position with a pillow under the lower pelvis. Patient was prepped and draped in normal sterile fashion. After fluoroscopic localization Xylocaine 1% was instilled per 25-gauge needle into the skin and subcutaneous tissue at the level of L4/5. An 18-gauge Touhy needle was then placed and advanced under direct fluoroscopic guidance and loss-of-resistance technique to the epidural space without difficulty. There was no CSF paresthesia or blood noted. After negative aspiration, This was then followed by a solution containing 80 mg of Depo-Medrol and 8 mL's of preservative-free normal saline, which was instilled without difficulty. The needle was withdrawn. A Band-Aid was applied. The patient tolerated procedure well without complication was discharged home with instructions. Electronically approved by: Sarah Mcneil.  Emelyn Wilkinson MD

## 2022-12-22 ENCOUNTER — OFFICE VISIT (OUTPATIENT)
Dept: PAIN CLINIC | Facility: HOSPITAL | Age: 77
End: 2022-12-22
Attending: NURSE PRACTITIONER
Payer: MEDICARE

## 2022-12-22 VITALS — SYSTOLIC BLOOD PRESSURE: 135 MMHG | DIASTOLIC BLOOD PRESSURE: 79 MMHG | HEART RATE: 64 BPM | OXYGEN SATURATION: 98 %

## 2022-12-22 DIAGNOSIS — M41.25 OTHER IDIOPATHIC SCOLIOSIS, THORACOLUMBAR REGION: Primary | ICD-10-CM

## 2022-12-22 DIAGNOSIS — M51.36 DDD (DEGENERATIVE DISC DISEASE), LUMBAR: ICD-10-CM

## 2022-12-22 PROCEDURE — 99211 OFF/OP EST MAY X REQ PHY/QHP: CPT

## 2022-12-22 NOTE — PROGRESS NOTES
PT presents ambulatory to the [facility].  PT states she still has left hip and lower back pain with maybe 50%. 8/10 pain. CHAKA Garcia saw PT for LESI L4/5 F/U. See notes for POC.

## 2023-01-05 ENCOUNTER — HOSPITAL ENCOUNTER (EMERGENCY)
Facility: HOSPITAL | Age: 78
Discharge: HOME | End: 2023-01-05
Attending: STUDENT IN AN ORGANIZED HEALTH CARE EDUCATION/TRAINING PROGRAM
Payer: MEDICARE

## 2023-01-05 ENCOUNTER — APPOINTMENT (OUTPATIENT)
Dept: GENERAL RADIOLOGY | Facility: HOSPITAL | Age: 78
End: 2023-01-05
Attending: STUDENT IN AN ORGANIZED HEALTH CARE EDUCATION/TRAINING PROGRAM
Payer: MEDICARE

## 2023-01-05 VITALS
HEIGHT: 61 IN | HEART RATE: 61 BPM | TEMPERATURE: 98 F | RESPIRATION RATE: 18 BRPM | WEIGHT: 121.25 LBS | OXYGEN SATURATION: 100 % | BODY MASS INDEX: 22.89 KG/M2 | DIASTOLIC BLOOD PRESSURE: 82 MMHG | SYSTOLIC BLOOD PRESSURE: 179 MMHG

## 2023-01-05 DIAGNOSIS — R07.89 CHEST WALL PAIN: Primary | ICD-10-CM

## 2023-01-05 DIAGNOSIS — M72.2 PLANTAR FASCIITIS: ICD-10-CM

## 2023-01-05 LAB
ALBUMIN SERPL-MCNC: 3.6 G/DL (ref 3.4–5)
ALBUMIN/GLOB SERPL: 1 {RATIO} (ref 1–2)
ALP LIVER SERPL-CCNC: 69 U/L
ALT SERPL-CCNC: 20 U/L
ANION GAP SERPL CALC-SCNC: 3 MMOL/L (ref 0–18)
AST SERPL-CCNC: 21 U/L (ref 15–37)
ATRIAL RATE: 67 BPM
BASOPHILS # BLD AUTO: 0.04 X10(3) UL (ref 0–0.2)
BASOPHILS NFR BLD AUTO: 0.5 %
BILIRUB SERPL-MCNC: 0.3 MG/DL (ref 0.1–2)
BUN BLD-MCNC: 21 MG/DL (ref 7–18)
BUN/CREAT SERPL: 20.4 (ref 10–20)
CALCIUM BLD-MCNC: 8.7 MG/DL (ref 8.5–10.1)
CHLORIDE SERPL-SCNC: 110 MMOL/L (ref 98–112)
CO2 SERPL-SCNC: 28 MMOL/L (ref 21–32)
CREAT BLD-MCNC: 1.03 MG/DL
DEPRECATED RDW RBC AUTO: 44.2 FL (ref 35.1–46.3)
EOSINOPHIL # BLD AUTO: 0.12 X10(3) UL (ref 0–0.7)
EOSINOPHIL NFR BLD AUTO: 1.6 %
ERYTHROCYTE [DISTWIDTH] IN BLOOD BY AUTOMATED COUNT: 12.9 % (ref 11–15)
GFR SERPLBLD BASED ON 1.73 SQ M-ARVRAT: 56 ML/MIN/1.73M2 (ref 60–?)
GLOBULIN PLAS-MCNC: 3.7 G/DL (ref 2.8–4.4)
GLUCOSE BLD-MCNC: 81 MG/DL (ref 70–99)
HCT VFR BLD AUTO: 43.2 %
HGB BLD-MCNC: 13.8 G/DL
IMM GRANULOCYTES # BLD AUTO: 0.01 X10(3) UL (ref 0–1)
IMM GRANULOCYTES NFR BLD: 0.1 %
LYMPHOCYTES # BLD AUTO: 4.91 X10(3) UL (ref 1–4)
LYMPHOCYTES NFR BLD AUTO: 64.5 %
MCH RBC QN AUTO: 29.9 PG (ref 26–34)
MCHC RBC AUTO-ENTMCNC: 31.9 G/DL (ref 31–37)
MCV RBC AUTO: 93.7 FL
MONOCYTES # BLD AUTO: 0.33 X10(3) UL (ref 0.1–1)
MONOCYTES NFR BLD AUTO: 4.3 %
NEUTROPHILS # BLD AUTO: 2.2 X10 (3) UL (ref 1.5–7.7)
NEUTROPHILS # BLD AUTO: 2.2 X10(3) UL (ref 1.5–7.7)
NEUTROPHILS NFR BLD AUTO: 29 %
OSMOLALITY SERPL CALC.SUM OF ELEC: 294 MOSM/KG (ref 275–295)
P AXIS: 55 DEGREES
P-R INTERVAL: 146 MS
PLATELET # BLD AUTO: 225 10(3)UL (ref 150–450)
POTASSIUM SERPL-SCNC: 4 MMOL/L (ref 3.5–5.1)
PROT SERPL-MCNC: 7.3 G/DL (ref 6.4–8.2)
Q-T INTERVAL: 414 MS
QRS DURATION: 76 MS
QTC CALCULATION (BEZET): 437 MS
R AXIS: 37 DEGREES
RBC # BLD AUTO: 4.61 X10(6)UL
SODIUM SERPL-SCNC: 141 MMOL/L (ref 136–145)
T AXIS: 74 DEGREES
TROPONIN I HIGH SENSITIVITY: 9 NG/L
VENTRICULAR RATE: 67 BPM
WBC # BLD AUTO: 7.6 X10(3) UL (ref 4–11)

## 2023-01-05 PROCEDURE — 36415 COLL VENOUS BLD VENIPUNCTURE: CPT

## 2023-01-05 PROCEDURE — 93010 ELECTROCARDIOGRAM REPORT: CPT

## 2023-01-05 PROCEDURE — 85025 COMPLETE CBC W/AUTO DIFF WBC: CPT | Performed by: STUDENT IN AN ORGANIZED HEALTH CARE EDUCATION/TRAINING PROGRAM

## 2023-01-05 PROCEDURE — 93005 ELECTROCARDIOGRAM TRACING: CPT

## 2023-01-05 PROCEDURE — 71045 X-RAY EXAM CHEST 1 VIEW: CPT | Performed by: STUDENT IN AN ORGANIZED HEALTH CARE EDUCATION/TRAINING PROGRAM

## 2023-01-05 PROCEDURE — 99284 EMERGENCY DEPT VISIT MOD MDM: CPT

## 2023-01-05 PROCEDURE — 80053 COMPREHEN METABOLIC PANEL: CPT | Performed by: STUDENT IN AN ORGANIZED HEALTH CARE EDUCATION/TRAINING PROGRAM

## 2023-01-05 PROCEDURE — 84484 ASSAY OF TROPONIN QUANT: CPT | Performed by: STUDENT IN AN ORGANIZED HEALTH CARE EDUCATION/TRAINING PROGRAM

## 2023-01-05 RX ORDER — IBUPROFEN 600 MG/1
600 TABLET ORAL EVERY 8 HOURS PRN
Qty: 21 TABLET | Refills: 0 | Status: SHIPPED | OUTPATIENT
Start: 2023-01-05 | End: 2023-01-12

## 2023-01-05 RX ORDER — LIDOCAINE 50 MG/G
1 PATCH TOPICAL EVERY 24 HOURS
Qty: 7 PATCH | Refills: 0 | Status: SHIPPED | OUTPATIENT
Start: 2023-01-05 | End: 2023-01-12

## 2023-01-05 NOTE — ED INITIAL ASSESSMENT (HPI)
Patient presents with chest pain for \"a while\". Patient states last night felt dizzy and sob as well.

## 2023-01-05 NOTE — DISCHARGE INSTRUCTIONS
.Thank you for seeking care at [facility] Emergency Department. You have been seen and evaluated for chest discomfort     We discussed the results of your workup. Your labs, EKG, and chest x-ray did not show severe findings   Please read the instructions provided   If given prescriptions, take as instructed    Remember, your care process does not end after your visit today. Please follow-up with your doctor within 1-2 days for a follow-up check to ensure you are  improving, to see if you need any further evaluation/testing, or to evaluate for any alternate diagnoses. Please return to the emergency department if you develop severe and persistent chest pain, difficulty breathing, back pain, abdominal pain, lightheadedness or dizziness, passing out, new swelling, fevers, coughing up blood, feeling ill, not acting normally, worsening symptoms, or for any other concerns as these can be signs of a medical emergency. We hope you feel better.

## 2023-01-09 NOTE — PROGRESS NOTES
01/09/23 0835   Clinical Encounter Type   Visited With Family; Patient not available   Crisis Visit ED   Referral From Nurse;Verbal   Family Spiritual Encounters   Family Coping Accepting; Fearful   Family Participation in Care Consistently   Family Support During Treatment Consistently   Taxonomy   Intended Effects Convey a calming presence   Methods Offer support   Interventions Acknowledge response to difficult experience; Explain  role;Silent prayer;Provide compassionate touch     Summary:  received referral from on-call  for family support.  visited with daughter outside of room while surgery was performed. Spiritual Care support can be requested via an [employer] consult or calling [phone].     -Rosamaria Pat, Chaplain Resident.

## 2023-03-11 ENCOUNTER — LAB ENCOUNTER (OUTPATIENT)
Dept: LAB | Facility: HOSPITAL | Age: 78
End: 2023-03-11
Attending: INTERNAL MEDICINE
Payer: MEDICARE

## 2023-03-11 ENCOUNTER — OFFICE VISIT (OUTPATIENT)
Dept: ENDOCRINOLOGY CLINIC | Facility: CLINIC | Age: 78
End: 2023-03-11

## 2023-03-11 VITALS
SYSTOLIC BLOOD PRESSURE: 137 MMHG | BODY MASS INDEX: 24.98 KG/M2 | WEIGHT: 141 LBS | HEIGHT: 63 IN | HEART RATE: 67 BPM | DIASTOLIC BLOOD PRESSURE: 69 MMHG

## 2023-03-11 DIAGNOSIS — M80.00XA AGE-RELATED OSTEOPOROSIS WITH CURRENT PATHOLOGICAL FRACTURE, INITIAL ENCOUNTER: Primary | ICD-10-CM

## 2023-03-11 DIAGNOSIS — M80.00XA AGE-RELATED OSTEOPOROSIS WITH CURRENT PATHOLOGICAL FRACTURE, INITIAL ENCOUNTER: ICD-10-CM

## 2023-03-11 DIAGNOSIS — E55.9 VITAMIN D DEFICIENCY: ICD-10-CM

## 2023-03-11 LAB
ALBUMIN SERPL-MCNC: 3.9 G/DL (ref 3.4–5)
ALBUMIN/GLOB SERPL: 1.2 {RATIO} (ref 1–2)
ALP LIVER SERPL-CCNC: 70 U/L
ALT SERPL-CCNC: 19 U/L
ANION GAP SERPL CALC-SCNC: 1 MMOL/L (ref 0–18)
AST SERPL-CCNC: 20 U/L (ref 15–37)
BILIRUB SERPL-MCNC: 0.5 MG/DL (ref 0.1–2)
BUN BLD-MCNC: 14 MG/DL (ref 7–18)
BUN/CREAT SERPL: 15.2 (ref 10–20)
CALCIUM BLD-MCNC: 8.7 MG/DL (ref 8.5–10.1)
CHLORIDE SERPL-SCNC: 109 MMOL/L (ref 98–112)
CO2 SERPL-SCNC: 31 MMOL/L (ref 21–32)
CREAT BLD-MCNC: 0.92 MG/DL
FASTING STATUS PATIENT QL REPORTED: YES
GFR SERPLBLD BASED ON 1.73 SQ M-ARVRAT: 64 ML/MIN/1.73M2 (ref 60–?)
GLOBULIN PLAS-MCNC: 3.2 G/DL (ref 2.8–4.4)
GLUCOSE BLD-MCNC: 98 MG/DL (ref 70–99)
OSMOLALITY SERPL CALC.SUM OF ELEC: 292 MOSM/KG (ref 275–295)
POTASSIUM SERPL-SCNC: 3.5 MMOL/L (ref 3.5–5.1)
PROT SERPL-MCNC: 7.1 G/DL (ref 6.4–8.2)
PTH-INTACT SERPL-MCNC: 90.6 PG/ML (ref 18.5–88)
SODIUM SERPL-SCNC: 141 MMOL/L (ref 136–145)
VIT D+METAB SERPL-MCNC: 10.1 NG/ML (ref 30–100)

## 2023-03-11 PROCEDURE — 99203 OFFICE O/P NEW LOW 30 MIN: CPT | Performed by: INTERNAL MEDICINE

## 2023-03-11 PROCEDURE — 84080 ASSAY ALKALINE PHOSPHATASES: CPT

## 2023-03-11 PROCEDURE — 3008F BODY MASS INDEX DOCD: CPT | Performed by: INTERNAL MEDICINE

## 2023-03-11 PROCEDURE — 3078F DIAST BP <80 MM HG: CPT | Performed by: INTERNAL MEDICINE

## 2023-03-11 PROCEDURE — 83970 ASSAY OF PARATHORMONE: CPT

## 2023-03-11 PROCEDURE — 82306 VITAMIN D 25 HYDROXY: CPT

## 2023-03-11 PROCEDURE — 36415 COLL VENOUS BLD VENIPUNCTURE: CPT

## 2023-03-11 PROCEDURE — 80053 COMPREHEN METABOLIC PANEL: CPT

## 2023-03-11 PROCEDURE — 3075F SYST BP GE 130 - 139MM HG: CPT | Performed by: INTERNAL MEDICINE

## 2023-03-12 PROBLEM — M80.00XA AGE-RELATED OSTEOPOROSIS WITH CURRENT PATHOLOGICAL FRACTURE: Status: ACTIVE | Noted: 2023-03-12

## 2023-03-12 PROBLEM — E55.9 VITAMIN D DEFICIENCY: Status: ACTIVE | Noted: 2023-03-12

## 2023-03-13 LAB — BONE SPECIFIC ALKALINE PHOSPHATASE: 12.2 UG/L

## 2023-03-20 ENCOUNTER — TELEPHONE (OUTPATIENT)
Dept: ENDOCRINOLOGY CLINIC | Facility: CLINIC | Age: 78
End: 2023-03-20

## 2023-03-20 DIAGNOSIS — E55.9 VITAMIN D DEFICIENCY: Primary | ICD-10-CM

## 2023-03-20 RX ORDER — ERGOCALCIFEROL 1.25 MG/1
50000 CAPSULE ORAL WEEKLY
Qty: 12 CAPSULE | Refills: 0 | Status: SHIPPED | OUTPATIENT
Start: 2023-03-20 | End: 2023-06-18

## 2023-03-20 NOTE — TELEPHONE ENCOUNTER
Hi!  Please let patient know that I have reviewed her blood tests and her vitamin D level is very low. She must be started on ergocalciferol 50,000 international units every week for 3 months. We will recheck levels in 3 months. Her PTH level is slightly elevated most likely due to the vitamin D deficiency. I will recheck this as well in 3 months. Please submit for insurance verification for Evenity. Thank you!

## 2023-03-20 NOTE — TELEPHONE ENCOUNTER
LM to call clinic to relay message below    MA's - Please submit for insurance verification for [employer]

## 2023-03-20 NOTE — TELEPHONE ENCOUNTER
Dr. Tennille Gonzalez    Please advise    Component      Latest Ref Rng & Units 3/11/2023   Glucose      70 - 99 mg/dL 98   Sodium      136 - 145 mmol/L 141   Potassium      3.5 - 5.1 mmol/L 3.5   Chloride      98 - 112 mmol/L 109   Carbon Dioxide, Total      21.0 - 32.0 mmol/L 31.0   ANION GAP      0 - 18 mmol/L 1   BUN      7 - 18 mg/dL 14   CREATININE      0.55 - 1.02 mg/dL 0.92   BUN/CREATININE RATIO      10.0 - 20.0 15.2   CALCIUM      8.5 - 10.1 mg/dL 8.7   CALCULATED OSMOLALITY      275 - 295 mOsm/kg 292   eGFR-Cr      >=60 mL/min/1.73m2 64   ALT (SGPT)      13 - 56 U/L 19   AST (SGOT)      15 - 37 U/L 20   ALKALINE PHOSPHATASE      55 - 142 U/L 70   Total Bilirubin      0.1 - 2.0 mg/dL 0.5   PROTEIN, TOTAL      6.4 - 8.2 g/dL 7.1   Albumin      3.4 - 5.0 g/dL 3.9   Globulin      2.8 - 4.4 g/dL 3.2   A/G Ratio      1.0 - 2.0 1.2   Patient Fasting for CMP?        Yes   BONE SPECIFIC ALKALINE PHOSPHA      ug/L 12.2   PTH INTACT      18.5 - 88.0 pg/mL 90.6 (H)   VITAMIN D, 25-OH, TOTAL      30.0 - 100.0 ng/mL 10.1 (L)

## 2023-04-14 NOTE — TELEPHONE ENCOUNTER
Hi!    Please let patient know that we should wait for repeat labs just because her vitamin D level was so low. Thank you!

## 2023-06-02 ENCOUNTER — LAB ENCOUNTER (OUTPATIENT)
Dept: LAB | Facility: HOSPITAL | Age: 78
End: 2023-06-02
Attending: INTERNAL MEDICINE
Payer: MEDICARE

## 2023-06-02 DIAGNOSIS — E55.9 VITAMIN D DEFICIENCY: ICD-10-CM

## 2023-06-02 LAB
ALBUMIN SERPL-MCNC: 3.7 G/DL (ref 3.4–5)
ALBUMIN/GLOB SERPL: 1.1 {RATIO} (ref 1–2)
ALP LIVER SERPL-CCNC: 64 U/L
ALT SERPL-CCNC: 20 U/L
ANION GAP SERPL CALC-SCNC: 5 MMOL/L (ref 0–18)
AST SERPL-CCNC: 24 U/L (ref 15–37)
BILIRUB SERPL-MCNC: 0.4 MG/DL (ref 0.1–2)
BUN BLD-MCNC: 13 MG/DL (ref 7–18)
BUN/CREAT SERPL: 15.1 (ref 10–20)
CALCIUM BLD-MCNC: 9.1 MG/DL (ref 8.5–10.1)
CHLORIDE SERPL-SCNC: 109 MMOL/L (ref 98–112)
CO2 SERPL-SCNC: 27 MMOL/L (ref 21–32)
CREAT BLD-MCNC: 0.86 MG/DL
FASTING STATUS PATIENT QL REPORTED: NO
GFR SERPLBLD BASED ON 1.73 SQ M-ARVRAT: 70 ML/MIN/1.73M2 (ref 60–?)
GLOBULIN PLAS-MCNC: 3.3 G/DL (ref 2.8–4.4)
GLUCOSE BLD-MCNC: 84 MG/DL (ref 70–99)
OSMOLALITY SERPL CALC.SUM OF ELEC: 291 MOSM/KG (ref 275–295)
POTASSIUM SERPL-SCNC: 3.9 MMOL/L (ref 3.5–5.1)
PROT SERPL-MCNC: 7 G/DL (ref 6.4–8.2)
PTH-INTACT SERPL-MCNC: 74.5 PG/ML (ref 18.5–88)
SODIUM SERPL-SCNC: 141 MMOL/L (ref 136–145)
VIT D+METAB SERPL-MCNC: 89.4 NG/ML (ref 30–100)

## 2023-06-02 PROCEDURE — 80053 COMPREHEN METABOLIC PANEL: CPT

## 2023-06-02 PROCEDURE — 36415 COLL VENOUS BLD VENIPUNCTURE: CPT

## 2023-06-02 PROCEDURE — 82306 VITAMIN D 25 HYDROXY: CPT

## 2023-06-02 PROCEDURE — 83970 ASSAY OF PARATHORMONE: CPT

## 2023-06-05 ENCOUNTER — PROCEDURE VISIT (OUTPATIENT)
Dept: PHYSICAL MEDICINE AND REHAB | Facility: CLINIC | Age: 78
End: 2023-06-05
Payer: COMMERCIAL

## 2023-06-05 DIAGNOSIS — M54.16 LUMBAR RADICULOPATHY: Primary | ICD-10-CM

## 2023-06-05 DIAGNOSIS — G57.02 COMMON PERONEAL NEUROPATHY OF LEFT LOWER EXTREMITY: ICD-10-CM

## 2023-06-05 PROCEDURE — 95908 NRV CNDJ TST 3-4 STUDIES: CPT | Performed by: PHYSICAL MEDICINE & REHABILITATION

## 2023-06-05 PROCEDURE — 95886 MUSC TEST DONE W/N TEST COMP: CPT | Performed by: PHYSICAL MEDICINE & REHABILITATION

## 2023-06-06 PROBLEM — M54.16 LUMBAR RADICULOPATHY: Status: ACTIVE | Noted: 2023-06-06

## 2023-06-06 PROBLEM — G57.02 COMMON PERONEAL NEUROPATHY OF LEFT LOWER EXTREMITY: Status: ACTIVE | Noted: 2023-06-06

## 2023-06-09 ENCOUNTER — TELEPHONE (OUTPATIENT)
Dept: ENDOCRINOLOGY CLINIC | Facility: CLINIC | Age: 78
End: 2023-06-09

## 2023-06-09 DIAGNOSIS — E55.9 VITAMIN D DEFICIENCY: ICD-10-CM

## 2023-06-09 DIAGNOSIS — M80.00XA AGE-RELATED OSTEOPOROSIS WITH CURRENT PATHOLOGICAL FRACTURE, INITIAL ENCOUNTER: Primary | ICD-10-CM

## 2023-06-09 NOTE — TELEPHONE ENCOUNTER
Pt states she finished the course of vitamin D and completed blood work.  Pt wants to know the next steps in the process requesting to speak to Dr. Ashwin Lees or Rn please call

## 2023-06-09 NOTE — TELEPHONE ENCOUNTER
Dr. Cande Canseco    Please advise    Component      Latest Ref Rng 6/2/2023   VITAMIN D, 25-OH, TOTAL      30.0 - 100.0 ng/mL 89.4

## 2023-06-12 NOTE — TELEPHONE ENCOUNTER
Called patient and discussed below. Pt asked how often to get the shot and RN informed her it's a monthly injection. Pt denied additional questions at this time. Preferred to come in Friday morning for first Evenity injection. Refer to 3/20/23 TE regarding PA/insurance verification for Evenity.

## 2023-06-12 NOTE — TELEPHONE ENCOUNTER
Hi!  Please let patient know that she continue with cholecalciferol 1,000 international units daily and we should proceed with the Evenity injections. I would like her to have repeat blood work in 6 months. Thank you.

## 2023-06-16 ENCOUNTER — NURSE ONLY (OUTPATIENT)
Dept: ENDOCRINOLOGY CLINIC | Facility: CLINIC | Age: 78
End: 2023-06-16

## 2023-06-16 DIAGNOSIS — M80.00XA AGE-RELATED OSTEOPOROSIS WITH CURRENT PATHOLOGICAL FRACTURE, INITIAL ENCOUNTER: Primary | ICD-10-CM

## 2023-06-16 PROCEDURE — 96372 THER/PROPH/DIAG INJ SC/IM: CPT | Performed by: INTERNAL MEDICINE

## 2023-06-16 NOTE — PROGRESS NOTES
Patient arrived at clinic for first Evenity injection - RN reviewed administration, timing, SE and when to call clinic - patient's questions were answered and patient agreed to [facility] injections. Patient given and tolerated 105mg Evenity injections to bilateral upper arms.   Patient stated understanding to return to clinic in one month for next injection - patient scheduled 7/21/23  Patient requested LOV note to be faxed to PCP - LOV note dtd 3/11/23 faxed to Dr. Lakeshia Garcia at fax # [phone]

## 2023-06-28 ENCOUNTER — TELEPHONE (OUTPATIENT)
Dept: ENDOCRINOLOGY CLINIC | Facility: CLINIC | Age: 78
End: 2023-06-28

## 2023-07-21 ENCOUNTER — NURSE ONLY (OUTPATIENT)
Dept: ENDOCRINOLOGY CLINIC | Facility: CLINIC | Age: 78
End: 2023-07-21

## 2023-07-21 DIAGNOSIS — M80.00XA AGE-RELATED OSTEOPOROSIS WITH CURRENT PATHOLOGICAL FRACTURE, INITIAL ENCOUNTER: Primary | ICD-10-CM

## 2023-07-21 PROCEDURE — 96372 THER/PROPH/DIAG INJ SC/IM: CPT | Performed by: INTERNAL MEDICINE

## 2023-07-21 NOTE — PROGRESS NOTES
Patient presents to clinic for second Evenity injection. This RN administered a total of 210 mg Evenity subcutaneous to bilateral upper arm and tolerated it well. Scheduled patient as below.      Future Appointments   Date Time Provider Aftab Almonte   8/25/2023 10:00 AM [facility] ANIL LUGO RN [facility]   12/13/2023 10:30 AM Franca Fine MD [facility]

## 2023-08-25 ENCOUNTER — NURSE ONLY (OUTPATIENT)
Dept: ENDOCRINOLOGY CLINIC | Facility: CLINIC | Age: 78
End: 2023-08-25

## 2023-08-25 DIAGNOSIS — M80.00XA AGE-RELATED OSTEOPOROSIS WITH CURRENT PATHOLOGICAL FRACTURE, INITIAL ENCOUNTER: Primary | ICD-10-CM

## 2023-08-25 PROCEDURE — 96372 THER/PROPH/DIAG INJ SC/IM: CPT | Performed by: INTERNAL MEDICINE

## 2023-08-25 NOTE — PROGRESS NOTES
Patient came in for Evenity injection.  Evenity 210mg administered to bilateral upper arms    No side effects or complications    Patient schedule for 9/29/23    Patient left in stable condition

## 2023-08-28 ENCOUNTER — APPOINTMENT (OUTPATIENT)
Dept: PAIN CLINIC | Facility: HOSPITAL | Age: 78
End: 2023-08-28
Attending: NURSE PRACTITIONER
Payer: MEDICARE

## 2023-08-28 ENCOUNTER — OFFICE VISIT (OUTPATIENT)
Dept: PAIN CLINIC | Facility: HOSPITAL | Age: 78
End: 2023-08-28
Attending: STUDENT IN AN ORGANIZED HEALTH CARE EDUCATION/TRAINING PROGRAM
Payer: MEDICARE

## 2023-08-28 VITALS — SYSTOLIC BLOOD PRESSURE: 121 MMHG | OXYGEN SATURATION: 98 % | HEART RATE: 61 BPM | DIASTOLIC BLOOD PRESSURE: 69 MMHG

## 2023-08-28 DIAGNOSIS — M48.061 SPINAL STENOSIS OF LUMBAR REGION, UNSPECIFIED WHETHER NEUROGENIC CLAUDICATION PRESENT: ICD-10-CM

## 2023-08-28 DIAGNOSIS — M41.9 SCOLIOSIS OF THORACOLUMBAR SPINE, UNSPECIFIED SCOLIOSIS TYPE: Primary | ICD-10-CM

## 2023-08-28 DIAGNOSIS — M41.125 ADOLESCENT IDIOPATHIC SCOLIOSIS OF THORACOLUMBAR REGION: Primary | ICD-10-CM

## 2023-08-28 PROCEDURE — 99211 OFF/OP EST MAY X REQ PHY/QHP: CPT

## 2023-08-28 NOTE — CHRONIC PAIN
Follow-up Note  Interval History:  Patient is seen today for follow up. She was last seen in December last year when she underwent LESI for lumbar radiculopathy. The patient has long history of severe thoracolumbar scoliosis and is seen intermittently at [facility] for her thoracic spine. She reports lumbar JOSE EDUARDO usually give her a few months of relief. Over the last few months she has been experiencing flare up of her low back and right leg pain. HISTORY OF PRESENT ILLNESS:  Nuha Luque is a 66year old female with history of chronic right sided low back pain/right hip pain after a right hip replacement 5 years ago who  returns to the clinic for follow up. Patient went for a second opinion at [facility] and they recommended that she have a diagnostic L4-5 lumbar epidural steroid injection to determine whether the pain is coming from the right post-surgical hip or from the lumbar spine. Patient is s/p LESI L4-5 on 12/7/2022 and reports 50% improvement in her low back pain symptoms. She continues to report right low back/posterior right hip pain along with right knee pain. Today, she rates her pain severity at 8/10. She says she will be following up with surgeon at [facility] on 1/17/2023. She says she also has followed with a pain medicine physician at [facility] in [facility]. She says that her pain wakes her up. She also says her balance is off and she usually ambulates with a cane but is here today with no cane. She takes gabapentin at night and when the pain wakes her up, she will take Tylenol. She also uses a heating pad to help with the pain. Patient denies any change in muscle strength. Also denies any change in bowel or bladder. No changes in numbness or tingling.    PAIN COURSE AND PREVIOUS INTERVENTIONS:  Medications:  Gabapentin, Tylenol  Interventions:  1-8-19 RIGHT TRANSFORAMINAL EPIDURAL STEROID INJECTION L4-5 BY DR Talia Monroe  2-5-19 right transforaminal epidural steroid injection L4-5 DR Willis Bryant  3-25-19- right hip scar infiltration- Dr Orlando Serrano  9-5-19- lesi L4-5 FITERRY  05/31/2022---caudal local guremandeepius  12/7/2022-KEZIAI L4/5-BELAVIC  ALLERGIES:  No Known Allergies  MEDICATION LIST:  Current Outpatient Medications   Medication Sig Dispense Refill    Cholecalciferol 25 MCG (1000 UT) Oral Tab Take 1 tablet (1,000 Units total) by mouth daily. 90 tablet 3    gabapentin 100 MG Oral Cap Take 100 mg by mouth 3 (three) times daily. acetaminophen 500 MG Oral Tab Take 500 mg by mouth every 6 (six) hours as needed for Pain. Multiple Vitamins-Minerals (ONE-A-DAY WOMENS 50+ ADVANTAGE) Oral Tab Take 1 tablet by mouth daily. REVIEW OF SYSTEMS:   Bowel/Bladder Incontinence: as above  Coughing/sneezing/straining does not exacerbate the pain. Numbness/tingling: as above  Weakness: as above  Weight Loss: Negative   Fever: Negative   Cardiovascular:  No current chest pain or palpitations   Respiratory:  No current shortness of breath   Gastrointestinal:  No active ulcer  Genitourinary:  Negative  Integumentary :  Negative  Psychiatric:  Negative  Hematologic: No active bleeding  Lymphatic: No current lymphedema  Allergic/Immunologic:  Negative  Musculoskeletal: As above  Neurological: As above  Denies chest pain, shortness of breath.   MEDICAL HISTORY:  Patient Active Problem List:     Osteoarthritis of right hip     Degenerative joint disease     Foraminal stenosis of lumbar region     Scoliosis     Vitamin D deficiency     Age-related osteoporosis with current pathological fracture     Common peroneal neuropathy of left lower extremity     Lumbar radiculopathy    Past Medical History:   Diagnosis Date    Endometriosis     Parkinson's disease ([facility] [address]     pt denies -- states her 's diagnosis    Pelvic kidney     left side    Scoliosis      SURGICAL HISTORY:  Past Surgical History:   Procedure Laterality Date    HYSTERECTOMY      partial in her 35s for endometriosis    OTHER SURGICAL HISTORY  2018    Right hip replacement     FAMILY HISTORY:  Family History   Problem Relation Age of Onset    Heart Attack Mother     Cancer Neg     Diabetes Neg     Hypertension Neg      SOCIAL HISTORY:  Social History    Socioeconomic History      Marital status:       Spouse name: Not on file      Number of children: Not on file      Years of education: Not on file      Highest education level: Not on file    Occupational History      Not on file    Tobacco Use      Smoking status: Never      Smokeless tobacco: Never    Substance and Sexual Activity      Alcohol use: No      Drug use: No      Sexual activity: Not on file    Other Topics      Concerns:        Not on file    Social History Narrative      Not on file    Social Determinants of Health  Financial Resource Strain: Not on file  Food Insecurity: Not on file  Transportation Needs: Not on file  Physical Activity: Not on file  Stress: Not on file  Social Connections: Not on file  Housing Stability: Not on file  ADVANCE CARE PLANNIN [address] NOT discussed.   PHYSICAL EXAMINATION:   23  1122   BP: 121/69   Pulse: 61      General: Alert and oriented x3, NAD, appears stated age, appropriate disposition and demeanor, answers questions appropriately   Head: normocephalic, atraumatic  Eyes: anicteric; no injection  Ears: no obvious deformities noted   Nose: externally grossly within normal limits, no unusual discharge or rhinorrhea   Throat: lips grossly within normal limits by visual exam externally  Neck: supple, trachea midline, no obvious JVD  Gait: Antalgic;  cane user - No no cane with her today  Spine: Kyphosis of the thoracic spine    Lumbar    Region Exam Right (+/-) Left  (+/-)   Lumbar Musculature Tender w/ palpation + +   Lumbar Facet Pain w/ extension + +         Lower Extremity SLR ++ -   Lower Extremity Crossed SLR - -       Sensation Right Left   L2: Proximal Anterior Thigh Normal Normal   L3: Mid Anterior Thigh Normal Normal   L4: Medial leg/foot, great toe (Saphenous n.) Normal Normal   L5: Dorsum of mid foot Normal Normal   S1: Lateral leg/foot, little toe, back of leg (Sural n.) Normal Normal       Motor Strength Right Left   L2:  5/5 5/5   L3:  5/5 5/5   L4:  5/5 5/5   L5:  5/5 5/5   S1:  5/5 5/5       IMAGING:  PROCEDURE: MRI SPINE LUMBAR (CPT=72148)       COMPARISON: [facility], MRI SPINE LUMBAR (CPT=72148), 9/15/2020, 10:52 AM.       INDICATIONS: M41.125 Adolescent idiopathic scoliosis of thoracolumbar region       TECHNIQUE: A variety of imaging planes and parameters were utilized for visualization of suspected pathology. FINDINGS:   NUMERATION: For the purposes of this examination, the lowest fully formed disc space is designated as L5-S1.   ALIGNMENT: There is preservation of the expected lumbar lordosis. Severe dextroscoliosis of the lower thoracic and lumbar spine. BONES: No fracture or suspicious osseous lesion is evident. CORD/CAUDA EQUINA: Low lying conus medullaris, which terminates at the L3 vertebral body level. There is, however, no associated fatty mass associated with the conus or distal thecal sac. No evidence of lumbosacral dysraphism. Cauda equina nerve roots    otherwise demonstrate preserved caliber and signal.   PARASPINAL AREA: No visible mass. OTHER: Chronic mild-to-moderate right hydronephrosis. There is also a pelvic left kidney. Well-circumscribed 1.8 cm T2 bright lesion in the left hepatic lobe. Susceptibility artifacts from right hip arthroplasty noted on localizer sequence. LUMBAR DISC LEVELS:   L1-L2: Small diffuse disc bulge with left greater than right facet arthropathy. Mild-to-moderate left neural foraminal stenosis with no additional significant neural compromise. L2-L3: Small diffuse disc bulge with left greater than right facet arthropathy. Mild left neural foraminal stenosis with no additional significant neural compromise.    L3-L4: Mild disc desiccation and degeneration with bilateral facet arthropathy. Mild left and minor right neural foraminal stenosis with no substantial spinal canal or lateral recess compromise. L4-L5: Right eccentric disc bulge with right greater than left facet arthropathy in addition to ligamentum flavum redundancy. Moderate to severe right neural foraminal stenosis with no additional spinal canal or lateral recess compromise. L5-S1: Mild bilateral facet arthropathy with no significant disc related degeneration. No substantial resultant neural compromise. Impression   CONCLUSION:       1. Severe dextroscoliosis of the lower thoracic and lumbar spine. 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       2. Multilevel degenerative changes of the lumbar spine as detailed. Notable levels as follows:       3. L4-L5:  Moderate to severe right neural foraminal stenosis. 4. L1-L2:  Mild-to-moderate left neural foraminal stenosis. 5. L2-L3:  Mild left neural foraminal stenosis. 6. L3-L4:  Mild left and minor right neural foraminal stenosis. 7. Stable low lying conus medullaris, which terminates at the L3 vertebral body level. There is, however, no associated lipomatous mass associated with the filum or distal thecal sac. No definite tethered cord or lumbosacral dysraphism. 8. Chronic mild-to-moderate right hydronephrosis with a pelvic left kidney.        9. Probable 1.8 cm left hepatic lobe cyst.       10. Lesser incidental findings as above.           elm-remote       Dictated by (CST): Maycol Hirsch MD on 10/28/2022 at 3:20 PM       Finalized by (CST): Maycol Hirsch MD on 10/28/2022 at 3:26 PM        PROCEDURE: MRI SPINE THORACIC (CPT=72146)       COMPARISON: [facility], MRI SPINE LUMBAR (TCC=[id number]), 10/28/2022, 11:43 AM.  [facility], CT SPINE LUMBAR (CPT=[id number]), 10/18/2022, 6:56 AM.  [facility], MRI SPINE LUMBAR (DGL=[id number]),   9/15/2020, 10:52 AM.  [facility], CT SPINE THORACIC (EKE=[id number]), 10/18/2022, 6:56 AM.  [facility], MRI SPINE THORACIC (GDE=[id number]), 9/15/2020, 10:10 AM.       INDICATIONS: M41.125 Adolescent idiopathic scoliosis of thoracolumbar region       TECHNIQUE: A variety of imaging planes and parameters were utilized for visualization of suspected pathology. Images were performed without contrast.         FINDINGS:   ALIGNMENT: The expected thoracic kyphosis is maintained. BONES: Marked distortion of thoracic anatomy related to severe reverse S-shaped thoracic scoliosis. 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 Also identified is a T2-T3 block vertebrae. Additional    partial fusion/arthrosis suspected at T11-T12. No gross acute thoracic spine fracture. CORD: Normal caliber, contour, and signal intensity. DISCS: Multilevel thoracic spine degenerative changes. There are multilevel right neural foraminal stenosis in the midthoracic spine. No gross high-grade right neural foraminal stenosis. Similarly, no gross high-grade spinal canal stenosis identified. PARASPINAL AREA: No visible mass. OTHER: Susceptibility artifacts from right hip arthroplasty are noted on localizer sequence. Stable chronic mild-to-moderate right hydronephrosis. 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 Well-circumscribed 1.8 cm T2 bright lesion in the left hepatic lobe. Impression   CONCLUSION:   1. Significantly limited examination secondary to marked distortion of thoracic anatomy related to severe reverse S-shaped thoracic scoliosis. There are suspected T2 and T12 vertebral segmentation anomalies with a T2-T3 block vertebrae and additional   partial fusion/arthrosis at T11-T12.   2. No definite acute thoracic spine fracture.   Grossly preserved caliber and signal of the thoracic spinal cord. 3. Multilevel thoracic spine degenerative changes with multilevel left neural foraminal stenosis throughout the midthoracic spine. 4. Chronic-appearing mild-to-moderate right hydronephrosis. There is also a pelvic left kidney. 5. Probable 1.8 cm left hepatic lobe cyst.           elm-remote       Dictated by (CST): Jefferson Garzon MD on 10/28/2022 at 3:04 PM       Finalized by (CST): Jefferson Garzon MD on 10/28/2022 at 3:19 PM        [place] PHYSICIAN MONITORING PROGRAM REVIEWED  No    ASSESSMENT AND PLAN:    Tanisha Shaw is a 66year old  female, with history of thoracolumbar scoliosis and chronic right sided low back and right hip pain after right hip surgery 5 years ago who returns for a post-procedure follow up visit. Patient is s/p LESI L4-5 on 12/7/2022 and reports 50% improvement in her low back pain symptoms.   -Pt will be following up with the surgeon at [facility] on 1/23/2023     #Lumbar Radicular Syndrome, chronic worsening   -Symptoms in the right L5 dermatome  -Will trial a LESI at L5/S1  -Follow up 4 weeks post-procedure    Comprehensive analgesic plan was formulated. Conservative vs. Aggressive measures were discussed at length including pharmacotherapy (eg. Anti- inflammatories, muscle relaxants, neuropathic medications, oral steroids, analgesics), injections, and further testing. Risks and benefits of all options were discussed at length to patients satisfaction during a comprehensive interactive discussion. All questions were answered during extended questions and answer session. Patient agreeable to discussion plan. Greater than 50% of the time was spent with counseling (nature of discussion centered around pain, therapy, and treatment options), face to face time, time spent reviewing data, obtaining patient information and discussing the care with the patients health care providers.      Total Time: 30 minutes  Rafita Mirza MD, 08/28/23, 6:02 PM

## 2023-08-28 NOTE — CHRONIC PAIN
Follow-up Note  CC: s/p LESI L4-5 on 12/7/2022 with Dr. Joshua Dalal:  Jj Lucero is a 66year old female with history of chronic right sided low back pain/right hip pain after a right hip replacement 5 years ago who  returns to the clinic for follow up. Patient went for a second opinion at [facility] and they recommended that she have a diagnostic L4-5 lumbar epidural steroid injection to determine whether the pain is coming from the right post-surgical hip or from the lumbar spine. Patient is s/p LESI L4-5 on 12/7/2022 and reports 50% improvement in her low back pain symptoms. She continues to report right low back/posterior right hip pain along with right knee pain. Today, she rates her pain severity at 8/10. She says she will be following up with surgeon at [facility] on 1/17/2023. She says she also has followed with a pain medicine physician at [facility] in [facility]. She says that her pain wakes her up. She also says her balance is off and she usually ambulates with a cane but is here today with no cane. She takes gabapentin at night and when the pain wakes her up, she will take Tylenol. She also uses a heating pad to help with the pain. Patient denies any change in muscle strength. Also denies any change in bowel or bladder. No changes in numbness or tingling. PAIN COURSE AND PREVIOUS INTERVENTIONS:  Medications:  Gabapentin, Tylenol  Interventions:  1-8-19 RIGHT TRANSFORAMINAL EPIDURAL STEROID INJECTION L4-5 BY DR Marika Branch  2-5-19 right transforaminal epidural steroid injection L4-5 DR Marika Branch  3-25-19- right hip scar infiltration- Dr Judy Tejada  9-5-19- lesi L4-5 ORLANDO  05/31/2022---caudal local gurevicius  12/7/2022-LESI L4/5-BELAVIC  ALLERGIES:  No Known Allergies  MEDICATION LIST:  Current Outpatient Medications   Medication Sig Dispense Refill    Cholecalciferol 25 MCG (1000 UT) Oral Tab Take 1 tablet (1,000 Units total) by mouth daily.  90 tablet 3    gabapentin 100 MG Oral Cap Take 100 mg by mouth 3 (three) times daily. acetaminophen 500 MG Oral Tab Take 500 mg by mouth every 6 (six) hours as needed for Pain. Multiple Vitamins-Minerals (ONE-A-DAY WOMENS 50+ ADVANTAGE) Oral Tab Take 1 tablet by mouth daily. REVIEW OF SYSTEMS:   Bowel/Bladder Incontinence: as above  Coughing/sneezing/straining does not exacerbate the pain. Numbness/tingling: as above  Weakness: as above  Weight Loss: Negative   Fever: Negative   Cardiovascular:  No current chest pain or palpitations   Respiratory:  No current shortness of breath   Gastrointestinal:  No active ulcer  Genitourinary:  Negative  Integumentary :  Negative  Psychiatric:  Negative  Hematologic: No active bleeding  Lymphatic: No current lymphedema  Allergic/Immunologic:  Negative  Musculoskeletal: As above  Neurological: As above  Denies chest pain, shortness of breath. MEDICAL HISTORY:  Patient Active Problem List:     Osteoarthritis of right hip     Degenerative joint disease     Foraminal stenosis of lumbar region     Scoliosis     Vitamin D deficiency     Age-related osteoporosis with current pathological fracture     Common peroneal neuropathy of left lower extremity     Lumbar radiculopathy    Past Medical History:   Diagnosis Date    Endometriosis     Parkinson's disease ([facility] [address]     pt denies -- states her 's diagnosis    Pelvic kidney     left side    Scoliosis      SURGICAL HISTORY:  Past Surgical History:   Procedure Laterality Date    HYSTERECTOMY      partial in her 35s for endometriosis    OTHER SURGICAL HISTORY  2018    Right hip replacement     FAMILY HISTORY:  Family History   Problem Relation Age of Onset    Heart Attack Mother     Cancer Neg     Diabetes Neg     Hypertension Neg      SOCIAL HISTORY:  Social History    Socioeconomic History      Marital status:        Spouse name: Not on file      Number of children: Not on file      Years of education: Not on file Highest education level: Not on file    Occupational History      Not on file    Tobacco Use      Smoking status: Never      Smokeless tobacco: Never    Substance and Sexual Activity      Alcohol use: No      Drug use: No      Sexual activity: Not on file    Other Topics      Concerns:        Not on file    Social History Narrative      Not on file    Social Determinants of Health  Financial Resource Strain: Not on file  Food Insecurity: Not on file  Transportation Needs: Not on file  Physical Activity: Not on file  Stress: Not on file  Social Connections: Not on file  Housing Stability: Not on file  ADVANCE CARE PLANNIN [address] NOT discussed. PHYSICAL EXAMINATION:   23  1122   BP: 121/69   Pulse: 61      General: Alert and oriented x3, NAD, appears stated age, appropriate disposition and demeanor, answers questions appropriately   Head: normocephalic, atraumatic  Eyes: anicteric; no injection  Ears: no obvious deformities noted   Nose: externally grossly within normal limits, no unusual discharge or rhinorrhea   Throat: lips grossly within normal limits by visual exam externally  Neck: supple, trachea midline, no obvious JVD  Gait: Antalgic;  cane user - No no cane with her today  Spine: Kyphosis of the thoracic spine  IMAGING:  PROCEDURE: MRI SPINE LUMBAR (CPT=72148)       COMPARISON: [facility], MRI SPINE LUMBAR (CPT=72148), 9/15/2020, 10:52 AM.       INDICATIONS: M41.125 Adolescent idiopathic scoliosis of thoracolumbar region       TECHNIQUE: A variety of imaging planes and parameters were utilized for visualization of suspected pathology. FINDINGS:   NUMERATION: For the purposes of this examination, the lowest fully formed disc space is designated as L5-S1.   ALIGNMENT: There is preservation of the expected lumbar lordosis. Severe dextroscoliosis of the lower thoracic and lumbar spine. BONES: No fracture or suspicious osseous lesion is evident.    CORD/CAUDA EQUINA: Low lying conus medullaris, which terminates at the L3 vertebral body level. There is, however, no associated fatty mass associated with the conus or distal thecal sac. No evidence of lumbosacral dysraphism. Cauda equina nerve roots    otherwise demonstrate preserved caliber and signal.   PARASPINAL AREA: No visible mass. OTHER: Chronic mild-to-moderate right hydronephrosis. There is also a pelvic left kidney. Well-circumscribed 1.8 cm T2 bright lesion in the left hepatic lobe. Susceptibility artifacts from right hip arthroplasty noted on localizer sequence. LUMBAR DISC LEVELS:   L1-L2: Small diffuse disc bulge with left greater than right facet arthropathy. Mild-to-moderate left neural foraminal stenosis with no additional significant neural compromise. L2-L3: Small diffuse disc bulge with left greater than right facet arthropathy. Mild left neural foraminal stenosis with no additional significant neural compromise. L3-L4: Mild disc desiccation and degeneration with bilateral facet arthropathy. Mild left and minor right neural foraminal stenosis with no substantial spinal canal or lateral recess compromise. L4-L5: Right eccentric disc bulge with right greater than left facet arthropathy in addition to ligamentum flavum redundancy. Moderate to severe right neural foraminal stenosis with no additional spinal canal or lateral recess compromise. L5-S1: Mild bilateral facet arthropathy with no significant disc related degeneration. No substantial resultant neural compromise. Impression   CONCLUSION:       1. Severe dextroscoliosis of the lower thoracic and lumbar spine. 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       2. Multilevel degenerative changes of the lumbar spine as detailed. Notable levels as follows:       3. L4-L5:  Moderate to severe right neural foraminal stenosis.    4. L1-L2:  Mild-to-moderate left neural foraminal stenosis. 5. L2-L3:  Mild left neural foraminal stenosis. 6. L3-L4:  Mild left and minor right neural foraminal stenosis. 7. Stable low lying conus medullaris, which terminates at the L3 vertebral body level. There is, however, no associated lipomatous mass associated with the filum or distal thecal sac. No definite tethered cord or lumbosacral dysraphism. 8. Chronic mild-to-moderate right hydronephrosis with a pelvic left kidney. 9. Probable 1.8 cm left hepatic lobe cyst.       10. Lesser incidental findings as above.           elm-remote       Dictated by (CST): Pk Camarillo MD on 10/28/2022 at 3:20 PM       Finalized by (CST): Pk Camarillo MD on 10/28/2022 at 3:26 PM        PROCEDURE: MRI SPINE THORACIC (CPT=72146)       COMPARISON: [facility], MRI SPINE LUMBAR ([id number]), 10/28/2022, 11:43 AM.  [facility], [place] ([id number]), 10/18/2022, 6:56 AM.  [facility], MRI SPINE LUMBAR (YSR=[id number]),   9/15/2020, 10:52 AM.  [facility], [address] ([id number]), 10/18/2022, 6:56 AM.  [facility], MRI SPINE THORACIC (YAJ=[id number]), 9/15/2020, 10:10 AM.       INDICATIONS: G83.895 Adolescent idiopathic scoliosis of thoracolumbar region       TECHNIQUE: A variety of imaging planes and parameters were utilized for visualization of suspected pathology. Images were performed without contrast.         FINDINGS:   ALIGNMENT: The expected thoracic kyphosis is maintained. BONES: Marked distortion of thoracic anatomy related to severe reverse S-shaped thoracic scoliosis. 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 Also identified is a T2-T3 block vertebrae. Additional    partial fusion/arthrosis suspected at T11-T12. No gross acute thoracic spine fracture. CORD: Normal caliber, contour, and signal intensity.      DISCS: Multilevel thoracic spine degenerative changes. There are multilevel right neural foraminal stenosis in the midthoracic spine. No gross high-grade right neural foraminal stenosis. Similarly, no gross high-grade spinal canal stenosis identified. PARASPINAL AREA: No visible mass. OTHER: Susceptibility artifacts from right hip arthroplasty are noted on localizer sequence. Stable chronic mild-to-moderate right hydronephrosis. 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 Well-circumscribed 1.8 cm T2 bright lesion in the left hepatic lobe. Impression   CONCLUSION:   1. Significantly limited examination secondary to marked distortion of thoracic anatomy related to severe reverse S-shaped thoracic scoliosis. There are suspected T2 and T12 vertebral segmentation anomalies with a T2-T3 block vertebrae and additional   partial fusion/arthrosis at T11-T12.   2. No definite acute thoracic spine fracture. Grossly preserved caliber and signal of the thoracic spinal cord. 3. Multilevel thoracic spine degenerative changes with multilevel left neural foraminal stenosis throughout the midthoracic spine. 4. Chronic-appearing mild-to-moderate right hydronephrosis. There is also a pelvic left kidney. 5. Probable 1.8 cm left hepatic lobe cyst.           elm-remote       Dictated by (CST): Tennille Marinelli MD on 10/28/2022 at 3:04 PM       Finalized by (CST): Tennille Marinelli MD on 10/28/2022 at 3:19 PM        [place] PHYSICIAN MONITORING PROGRAM REVIEWED  No    ASSESSMENT AND PLAN:    Jj Lucero is a 66year old  female, with history of thoracolumbar scoliosis and chronic right sided low back and right hip pain after right hip surgery 5 years ago who returns for a post-procedure follow up visit. Patient is s/p LESI L4-5 on 12/7/2022 and reports 50% improvement in her low back pain symptoms.   -Pt will be following up with the surgeon at [facility] on 1/23/2023     Pt will return to clinic PRN. Total Time: 20 minutes  Comprehensive analgesic plan was formulated. Conservative vs. Aggressive measures were discussed at length including pharmacotherapy (eg. Anti- inflammatories, muscle relaxants, neuropathic medications, oral steroids, analgesics), injections, and further testing. Risks and benefits of all options were discussed at length to patients satisfaction during a comprehensive interactive discussion. All questions were answered during extended questions and answer session. Patient agreeable to discussion plan. Greater than 50% of the time was spent with counseling (nature of discussion centered around pain, therapy, and treatment options), face to face time, time spent reviewing data, obtaining patient information and discussing the care with the patients health care providers.

## 2023-08-28 NOTE — PROGRESS NOTES
PT presents ambulatory to the [facility].  PT last seen 12.22.22. Pt reports  \"I'm still having the same issues\". Increased upper and lower pain. PT states she is also seeing pain Dr. Angelia Parish for injections. 6-7/10 pain that randomly increases. Dr. Rosibel Moulton saw PT to discuss another injection. Refer to dication  for POC.

## 2023-08-30 ENCOUNTER — TELEPHONE (OUTPATIENT)
Dept: PAIN CLINIC | Facility: HOSPITAL | Age: 78
End: 2023-08-30

## 2023-08-30 DIAGNOSIS — M54.16 LUMBAR RADICULOPATHY: Primary | ICD-10-CM

## 2023-09-15 ENCOUNTER — HOSPITAL ENCOUNTER (EMERGENCY)
Facility: HOSPITAL | Age: 78
Discharge: HOME | End: 2023-09-15
Attending: EMERGENCY MEDICINE
Payer: MEDICARE

## 2023-09-15 VITALS
OXYGEN SATURATION: 99 % | HEIGHT: 63 IN | RESPIRATION RATE: 16 BRPM | BODY MASS INDEX: 23.92 KG/M2 | SYSTOLIC BLOOD PRESSURE: 154 MMHG | HEART RATE: 75 BPM | DIASTOLIC BLOOD PRESSURE: 80 MMHG | TEMPERATURE: 98 F | WEIGHT: 135 LBS

## 2023-09-15 DIAGNOSIS — M79.671 RIGHT FOOT PAIN: Primary | ICD-10-CM

## 2023-09-15 PROCEDURE — 96372 THER/PROPH/DIAG INJ SC/IM: CPT

## 2023-09-15 PROCEDURE — 99284 EMERGENCY DEPT VISIT MOD MDM: CPT

## 2023-09-15 PROCEDURE — 99283 EMERGENCY DEPT VISIT LOW MDM: CPT

## 2023-09-15 RX ORDER — GABAPENTIN 400 MG/1
400 CAPSULE ORAL ONCE
Status: COMPLETED | OUTPATIENT
Start: 2023-09-15 | End: 2023-09-15

## 2023-09-15 RX ORDER — KETOROLAC TROMETHAMINE 15 MG/ML
15 INJECTION, SOLUTION INTRAMUSCULAR; INTRAVENOUS ONCE
Status: COMPLETED | OUTPATIENT
Start: 2023-09-15 | End: 2023-09-15

## 2023-09-15 NOTE — ED INITIAL ASSESSMENT (HPI)
Pt to ED for c/o right hip and back pain that radiates down to her foot. States she got an injection approx. 5 days ago that was supposed to help with the pain; States injection did not help. Pain in her foot is keeping pt up at night.

## 2023-09-29 ENCOUNTER — NURSE ONLY (OUTPATIENT)
Dept: ENDOCRINOLOGY CLINIC | Facility: CLINIC | Age: 78
End: 2023-09-29

## 2023-09-29 DIAGNOSIS — M80.00XA AGE-RELATED OSTEOPOROSIS WITH CURRENT PATHOLOGICAL FRACTURE, INITIAL ENCOUNTER: Primary | ICD-10-CM

## 2023-09-29 PROCEDURE — 96372 THER/PROPH/DIAG INJ SC/IM: CPT | Performed by: INTERNAL MEDICINE

## 2023-09-29 NOTE — PROGRESS NOTES
Patient arrived in clinic for 4th Evenity injection.  Patient given and tolerated 210 mg Evenity injections to bilateral upper arms - 105mg each  No side effects or complications   Patient scheduled for 5th Evenity on 10/30/23  Patient left in stable condition

## 2023-10-17 ENCOUNTER — TELEPHONE (OUTPATIENT)
Dept: ENDOCRINOLOGY CLINIC | Facility: CLINIC | Age: 78
End: 2023-10-17

## 2023-10-18 NOTE — TELEPHONE ENCOUNTER
Pt wanted to know when she should do her labs since her appt with provider got pushed back to Jan 2024. RN informed patient she can still do it in December or if she prefers waiting closer to her Jan 2024 appointment that is also fine. Pt voiced understanding and prefers doing it closer to her appointment. RN advised to do labs a week prior to appointment to ensure all tests are back for discussion during appointment.

## 2023-10-30 ENCOUNTER — NURSE ONLY (OUTPATIENT)
Dept: ENDOCRINOLOGY CLINIC | Facility: CLINIC | Age: 78
End: 2023-10-30

## 2023-10-30 DIAGNOSIS — M80.00XA AGE-RELATED OSTEOPOROSIS WITH CURRENT PATHOLOGICAL FRACTURE, INITIAL ENCOUNTER: Primary | ICD-10-CM

## 2023-10-30 PROCEDURE — 96372 THER/PROPH/DIAG INJ SC/IM: CPT | Performed by: INTERNAL MEDICINE

## 2023-10-30 NOTE — PROGRESS NOTES
Patient came into clinic for Evenity. Evenity 210mg administered into bilateral upper arms. No side effects or complications. Patient scheduled for next injection and rescheduled MD f/u appt.

## 2023-11-04 ENCOUNTER — HOSPITAL ENCOUNTER (OUTPATIENT)
Dept: GENERAL RADIOLOGY | Facility: HOSPITAL | Age: 78
Discharge: HOME | End: 2023-11-04
Attending: INTERNAL MEDICINE
Payer: MEDICARE

## 2023-11-04 DIAGNOSIS — M87.00 AVASCULAR NECROSIS (HCC): ICD-10-CM

## 2023-11-04 PROCEDURE — 73502 X-RAY EXAM HIP UNI 2-3 VIEWS: CPT | Performed by: INTERNAL MEDICINE

## 2023-11-30 ENCOUNTER — NURSE ONLY (OUTPATIENT)
Dept: ENDOCRINOLOGY CLINIC | Facility: CLINIC | Age: 78
End: 2023-11-30

## 2023-11-30 ENCOUNTER — TELEPHONE (OUTPATIENT)
Dept: ENDOCRINOLOGY CLINIC | Facility: CLINIC | Age: 78
End: 2023-11-30

## 2023-11-30 DIAGNOSIS — M80.00XA AGE-RELATED OSTEOPOROSIS WITH CURRENT PATHOLOGICAL FRACTURE, INITIAL ENCOUNTER: Primary | ICD-10-CM

## 2023-11-30 PROCEDURE — 96372 THER/PROPH/DIAG INJ SC/IM: CPT | Performed by: INTERNAL MEDICINE

## 2023-11-30 NOTE — TELEPHONE ENCOUNTER
Dr. Gogo Hannah    Patient came in today for Evenity injections. She is due to come back in again in early January. She has an appointment with you on 1/8/24. Would it be ok for her to wait until her appointment with you to get her injections? She will be overdue but would prefer to not come in twice. Please advise.

## 2023-12-01 NOTE — TELEPHONE ENCOUNTER
Patient informed she will receive injection at [facility] on 01/08/24

## 2023-12-12 ENCOUNTER — LAB ENCOUNTER (OUTPATIENT)
Dept: LAB | Facility: HOSPITAL | Age: 78
End: 2023-12-12
Attending: INTERNAL MEDICINE
Payer: MEDICARE

## 2023-12-12 DIAGNOSIS — M80.00XA AGE-RELATED OSTEOPOROSIS WITH CURRENT PATHOLOGICAL FRACTURE, INITIAL ENCOUNTER: ICD-10-CM

## 2023-12-12 DIAGNOSIS — E55.9 VITAMIN D DEFICIENCY: ICD-10-CM

## 2023-12-12 LAB
ALBUMIN SERPL-MCNC: 4.3 G/DL (ref 3.2–4.8)
ALBUMIN/GLOB SERPL: 1.5 {RATIO} (ref 1–2)
ALP LIVER SERPL-CCNC: 95 U/L
ALT SERPL-CCNC: 12 U/L
ANION GAP SERPL CALC-SCNC: 8 MMOL/L (ref 0–18)
AST SERPL-CCNC: 20 U/L (ref ?–34)
BILIRUB SERPL-MCNC: 0.5 MG/DL (ref 0.2–1.1)
BUN BLD-MCNC: 15 MG/DL (ref 9–23)
BUN/CREAT SERPL: 17 (ref 10–20)
CALCIUM BLD-MCNC: 8.9 MG/DL (ref 8.7–10.4)
CHLORIDE SERPL-SCNC: 105 MMOL/L (ref 98–112)
CO2 SERPL-SCNC: 25 MMOL/L (ref 21–32)
CREAT BLD-MCNC: 0.88 MG/DL
EGFRCR SERPLBLD CKD-EPI 2021: 67 ML/MIN/1.73M2 (ref 60–?)
FASTING STATUS PATIENT QL REPORTED: NO
GLOBULIN PLAS-MCNC: 2.8 G/DL (ref 2.8–4.4)
GLUCOSE BLD-MCNC: 85 MG/DL (ref 70–99)
OSMOLALITY SERPL CALC.SUM OF ELEC: 286 MOSM/KG (ref 275–295)
POTASSIUM SERPL-SCNC: 4 MMOL/L (ref 3.5–5.1)
PROT SERPL-MCNC: 7.1 G/DL (ref 5.7–8.2)
PTH-INTACT SERPL-MCNC: 152.7 PG/ML (ref 18.5–88)
SODIUM SERPL-SCNC: 138 MMOL/L (ref 136–145)
VIT D+METAB SERPL-MCNC: 36.9 NG/ML (ref 30–100)

## 2023-12-12 PROCEDURE — 84080 ASSAY ALKALINE PHOSPHATASES: CPT

## 2023-12-12 PROCEDURE — 80053 COMPREHEN METABOLIC PANEL: CPT

## 2023-12-12 PROCEDURE — 82306 VITAMIN D 25 HYDROXY: CPT

## 2023-12-12 PROCEDURE — 83970 ASSAY OF PARATHORMONE: CPT

## 2023-12-12 PROCEDURE — 36415 COLL VENOUS BLD VENIPUNCTURE: CPT

## 2023-12-19 LAB — ALKALINE PHOSPHATASE BONE SPECIFIC: 26.9 UG/L

## 2024-01-09 ENCOUNTER — OFFICE VISIT (OUTPATIENT)
Dept: ENDOCRINOLOGY CLINIC | Facility: CLINIC | Age: 79
End: 2024-01-09

## 2024-01-09 VITALS
HEART RATE: 65 BPM | WEIGHT: 129 LBS | DIASTOLIC BLOOD PRESSURE: 57 MMHG | BODY MASS INDEX: 22.86 KG/M2 | SYSTOLIC BLOOD PRESSURE: 108 MMHG | HEIGHT: 63 IN

## 2024-01-09 DIAGNOSIS — M80.00XA AGE-RELATED OSTEOPOROSIS WITH CURRENT PATHOLOGICAL FRACTURE, INITIAL ENCOUNTER: Primary | ICD-10-CM

## 2024-01-09 DIAGNOSIS — E55.9 VITAMIN D DEFICIENCY: ICD-10-CM

## 2024-01-09 PROCEDURE — 3074F SYST BP LT 130 MM HG: CPT | Performed by: INTERNAL MEDICINE

## 2024-01-09 PROCEDURE — 3008F BODY MASS INDEX DOCD: CPT | Performed by: INTERNAL MEDICINE

## 2024-01-09 PROCEDURE — 96372 THER/PROPH/DIAG INJ SC/IM: CPT | Performed by: INTERNAL MEDICINE

## 2024-01-09 PROCEDURE — 99214 OFFICE O/P EST MOD 30 MIN: CPT | Performed by: INTERNAL MEDICINE

## 2024-01-09 PROCEDURE — 3078F DIAST BP <80 MM HG: CPT | Performed by: INTERNAL MEDICINE
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
## 2024-01-10 ENCOUNTER — TELEPHONE (OUTPATIENT)
Dept: ENDOCRINOLOGY CLINIC | Facility: CLINIC | Age: 79
End: 2024-01-10

## 2024-02-09 ENCOUNTER — NURSE ONLY (OUTPATIENT)
Dept: ENDOCRINOLOGY CLINIC | Facility: CLINIC | Age: 79
End: 2024-02-09

## 2024-02-09 DIAGNOSIS — M80.00XA AGE-RELATED OSTEOPOROSIS WITH CURRENT PATHOLOGICAL FRACTURE, INITIAL ENCOUNTER: Primary | ICD-10-CM

## 2024-02-09 NOTE — PROGRESS NOTES
Patient present to clinic today for Evenity injections. Patient tolerated into left and right arm. Patient understands to return in 30 days for next injection. Patient requested we fax her previous notes to her doctor in [facility], F: [phone]. Faxed requested notes.

## 2024-03-11 ENCOUNTER — NURSE ONLY (OUTPATIENT)
Dept: ENDOCRINOLOGY CLINIC | Facility: CLINIC | Age: 79
End: 2024-03-11

## 2024-03-11 ENCOUNTER — TELEPHONE (OUTPATIENT)
Dept: ENDOCRINOLOGY CLINIC | Facility: CLINIC | Age: 79
End: 2024-03-11

## 2024-03-11 DIAGNOSIS — M80.00XA AGE-RELATED OSTEOPOROSIS WITH CURRENT PATHOLOGICAL FRACTURE, INITIAL ENCOUNTER: Primary | ICD-10-CM

## 2024-03-11 NOTE — PROGRESS NOTES
Patient present to clinic today for Evenity injections. This is her 9th injection. Patient tolerated into left and right upper arm. Patient understands to return in 30 days for next injection.         She states sometimes her arms hurt for a few days after the injection, however she tolerates it well.     She states she has been having some back pain related to her scoliosis. She will be seeing her pain doctor this Wednesday to inquire if she can get an injection in her back. She is unsure what medication this injection is. She was instructed to ask her pain doctor if ok to get this injection while on evenity. She was provided with medication hand out to show her provider. Advised he can contact our office if he has any questions.

## 2024-03-11 NOTE — PATIENT INSTRUCTIONS
Please schedule nurse visit in 1 month for your next evenity injection.     We will inform Dr. Parrish of your imaging performed at [facility]. You will be contacted after she has reviewed them.    Romosozumab Prefilled Syringe  Brands: Evenity  Uses  For bone strength.  Instructions  This medicine will be given to you at the doctor's office.  This medicine is used by injecting it into the skin. Please ask your doctor, nurse or pharmacist for the correct places on your body where this medicine can be injected.  This medicine should be given by a trained health care provider.  If you miss a dose, contact your doctor for instructions.  Drug interactions can change how medicines work or increase risk for side effects. Tell your health care providers about all medicines taken. Include prescription and over-the-counter medicines, vitamins, and herbal medicines. Speak with your doctor or pharmacist before starting or stopping any medicine.  You may need vitamin and mineral supplements while on this medicine. Please speak with your doctor or pharmacist.  Visit your dentist regularly. Proper care of your teeth is very important while taking this medicine. Brush your teeth and floss regularly.  Keep all appointments for medical exams and tests while on this medicine.  Cautions  Tell your doctor and pharmacist if you ever had an allergic reaction to a medicine.  This medicine is associated with an increased risk of serious heart problems, heart attack, and stroke. Please speak with your doctor about the risks and benefits of using this medicine. Contact your doctor immediately if you experience chest pain or difficulty breathing.  Tell the doctor or pharmacist if you are pregnant, planning to be pregnant, or breastfeeding.  If you have had a heart attack or a stroke within the past year, talk to your doctor before using this medicine.  Some patients have serious side effects from this medicine. Ask your pharmacist to  show you the information from the Food and Drug Administration (FDA) and discuss it with you.  Side Effects  The following is a list of some common side effects from this medicine. Please speak with your doctor about what you should do if you experience these or other side effects.  pain in the joints  Call your doctor or get medical help right away if you notice any of these more serious side effects:  severe or persistent bone, joint or jaw pain  chest or jaw pain  numbness or tingling in hands and feet  muscle aches, spasms or abnormal movements  unusual or unexplained pain in hips, thighs or groin  shortness of breath  symptoms of stroke (such as one-sided weakness, slurred speech, confusion)  sudden change or loss of vision  A few people may have an allergic reaction to this medicine. Symptoms can include difficulty breathing, skin rash, itching, swelling, or severe dizziness. If you notice any of these symptoms, seek medical help quickly.  Extra  Please speak with your doctor, nurse, or pharmacist if you have any questions about this medicine.  [email or web address]  IMPORTANT NOTE: This document tells you briefly how to take your medicine, but it does not tell you all there is to know about it. Your doctor or pharmacist may give you other documents about your medicine. Please talk to them if you have any questions. Always follow their advice. There is a more complete description of this medicine available in English. Scan this code on your smartphone or tablet or use the web address below. You can also ask your pharmacist for a printout. If you have any questions, please ask your pharmacist. The display and use of this drug information is subject to Terms of Use. Copyright(c) 2023 [employer]   © 4329-5225 [employer]. All rights reserved. This information is not intended as a substitute for professional medical care. Always follow your healthcare professional's  instructions.

## 2024-03-11 NOTE — TELEPHONE ENCOUNTER
Patient seen today for evenity injection. She states that she had MRI and CT done at [facility] for her bone health per request from her surgeon. She would like Dr. Parrish to review resuls (available in care everywhere) and advise on plan of care for her bones. She wants to have surgery to correct her pain, but was told she has to wait until she finishes evenity.

## 2024-03-15 NOTE — TELEPHONE ENCOUNTER
Hi!    Please let her know that she will be done with these injections in 3 months. Then she can have the operation. Please ask her doctor to give me a call. I would be happy to speak with him or her. Thank you!

## 2024-03-15 NOTE — TELEPHONE ENCOUNTER
.Patient informed  recommendations as indicated by provider Patient voiced understanding       Patient provided the below contact for her orthopedic provider/ spine health   Keyanna Lucero [facility] [phone]  Message left for Keyanna to contact Dr Parrish if needed.

## 2024-04-12 ENCOUNTER — NURSE ONLY (OUTPATIENT)
Dept: ENDOCRINOLOGY CLINIC | Facility: CLINIC | Age: 79
End: 2024-04-12

## 2024-04-12 DIAGNOSIS — M80.00XA AGE-RELATED OSTEOPOROSIS WITH CURRENT PATHOLOGICAL FRACTURE, INITIAL ENCOUNTER: Primary | ICD-10-CM

## 2024-04-12 PROCEDURE — 96372 THER/PROPH/DIAG INJ SC/IM: CPT | Performed by: INTERNAL MEDICINE

## 2024-04-12 NOTE — PROGRESS NOTES
Patient presents to clinic today for Evenity(romosozumab-aqqg) 210 mg/mL in left and right upper arm. Patient tolerated well. Pt understands to return in 1 months for next injection.

## 2024-05-13 ENCOUNTER — NURSE ONLY (OUTPATIENT)
Dept: ENDOCRINOLOGY CLINIC | Facility: CLINIC | Age: 79
End: 2024-05-13

## 2024-05-13 ENCOUNTER — TELEPHONE (OUTPATIENT)
Dept: ENDOCRINOLOGY CLINIC | Facility: CLINIC | Age: 79
End: 2024-05-13

## 2024-05-13 DIAGNOSIS — E55.9 VITAMIN D DEFICIENCY: Primary | ICD-10-CM

## 2024-05-13 DIAGNOSIS — M80.00XA AGE-RELATED OSTEOPOROSIS WITH CURRENT PATHOLOGICAL FRACTURE, INITIAL ENCOUNTER: ICD-10-CM

## 2024-05-13 DIAGNOSIS — M80.00XA AGE-RELATED OSTEOPOROSIS WITH CURRENT PATHOLOGICAL FRACTURE, INITIAL ENCOUNTER: Primary | ICD-10-CM

## 2024-05-13 NOTE — PROGRESS NOTES
Patient was seen today for Evenity injection. Injection given to the right upper arm and left upper arm. Patient tolerated well.    used: No  Written medication information sheet provided: Patient declined    Patient declined to discuss side effects.     Today this is the 11th injection.   Last Evenity injection on 04/12/24  Insurance verification completed: yes  Last dexa scan: 10/28/24      Patient advised to return to clinic in 1 month with Dr Parrish for follow up and last injection. Appointment scheduled for patient on 6/18/24  Next labs due: One week prior to appointment  Future labs ordered: yes by Dr Parrish      Patient is planning to have nerve surgery on 6/24/24

## 2024-05-13 NOTE — TELEPHONE ENCOUNTER
Hi!  Is this the 12th Evenity injection? Yes, I would like her to have lab tests. I will place orders. Thank you!

## 2024-05-13 NOTE — TELEPHONE ENCOUNTER
Dr Parrish,    This patient is coming for her Evenity injection 11/12 today and the plan is to receive Prolia injections next.     Last set of labs were on 12/12/23, would you like her to repeat labs before starting Prolia or after?     Please advise, thanks.

## 2024-05-13 NOTE — TELEPHONE ENCOUNTER
Confirmed plan with Dr Parrish as per below.     Complete blood tests 1 week before her appt with Dr Parrish and start paperwork to be approved for insurance for Prolia about 2-3 weeks after Evenity 12th shot.     Appointment scheduled next month 6/18/24 with Dr Parrish for her 12th Evenity injection. She will need her first Prolia injection two to three weeks after appointment.     Endo MA please start IV for Prolia. Thanks!

## 2024-05-17 NOTE — TELEPHONE ENCOUNTER
Submitted SOB via [employer] portal  Awaiting 3-5 business days  [employer] PA form placed in provider folder for review and signature.

## 2024-05-31 ENCOUNTER — TELEPHONE (OUTPATIENT)
Dept: ENDOCRINOLOGY CLINIC | Facility: CLINIC | Age: 79
End: 2024-05-31

## 2024-05-31 NOTE — TELEPHONE ENCOUNTER
Patient called to speak with Dr. Parrish's nurses regarding completing some blood work at [facility] and doesn't think she needs to complete any at [facility] for her 6/18 appointment. Please advise.

## 2024-06-03 NOTE — TELEPHONE ENCOUNTER
LM advising patient to complete labs ordered 5/13/24 except vitamin D - since they were not part of labs drawn on 5/21/24 at [facility]

## 2024-06-11 ENCOUNTER — LAB ENCOUNTER (OUTPATIENT)
Dept: LAB | Facility: HOSPITAL | Age: 79
End: 2024-06-11
Attending: INTERNAL MEDICINE
Payer: MEDICARE

## 2024-06-11 DIAGNOSIS — E55.9 VITAMIN D DEFICIENCY: ICD-10-CM

## 2024-06-11 DIAGNOSIS — M80.00XA AGE-RELATED OSTEOPOROSIS WITH CURRENT PATHOLOGICAL FRACTURE, INITIAL ENCOUNTER: ICD-10-CM

## 2024-06-11 LAB
ALBUMIN SERPL-MCNC: 4.4 G/DL (ref 3.2–4.8)
ALBUMIN/GLOB SERPL: 1.6 {RATIO} (ref 1–2)
ALP LIVER SERPL-CCNC: 79 U/L
ALT SERPL-CCNC: 10 U/L
ANION GAP SERPL CALC-SCNC: 5 MMOL/L (ref 0–18)
AST SERPL-CCNC: 26 U/L (ref ?–34)
BILIRUB SERPL-MCNC: 0.5 MG/DL (ref 0.2–1.1)
BUN BLD-MCNC: 19 MG/DL (ref 9–23)
BUN/CREAT SERPL: 17 (ref 10–20)
CALCIUM BLD-MCNC: 9.4 MG/DL (ref 8.7–10.4)
CHLORIDE SERPL-SCNC: 110 MMOL/L (ref 98–112)
CO2 SERPL-SCNC: 27 MMOL/L (ref 21–32)
CREAT BLD-MCNC: 1.12 MG/DL
EGFRCR SERPLBLD CKD-EPI 2021: 50 ML/MIN/1.73M2 (ref 60–?)
FASTING STATUS PATIENT QL REPORTED: NO
GLOBULIN PLAS-MCNC: 2.7 G/DL (ref 2–3.5)
GLUCOSE BLD-MCNC: 77 MG/DL (ref 70–99)
OSMOLALITY SERPL CALC.SUM OF ELEC: 295 MOSM/KG (ref 275–295)
POTASSIUM SERPL-SCNC: 4.2 MMOL/L (ref 3.5–5.1)
PROT SERPL-MCNC: 7.1 G/DL (ref 5.7–8.2)
PTH-INTACT SERPL-MCNC: 101.5 PG/ML (ref 18.5–88)
SODIUM SERPL-SCNC: 142 MMOL/L (ref 136–145)
VIT D+METAB SERPL-MCNC: 28 NG/ML (ref 30–100)

## 2024-06-11 PROCEDURE — 84080 ASSAY ALKALINE PHOSPHATASES: CPT

## 2024-06-11 PROCEDURE — 82306 VITAMIN D 25 HYDROXY: CPT

## 2024-06-11 PROCEDURE — 80053 COMPREHEN METABOLIC PANEL: CPT

## 2024-06-11 PROCEDURE — 36415 COLL VENOUS BLD VENIPUNCTURE: CPT

## 2024-06-11 PROCEDURE — 83970 ASSAY OF PARATHORMONE: CPT

## 2024-06-14 LAB — ALKALINE PHOSPHATASE BONE SPECIFIC: 19.8 UG/L

## 2024-06-18 ENCOUNTER — OFFICE VISIT (OUTPATIENT)
Dept: ENDOCRINOLOGY CLINIC | Facility: CLINIC | Age: 79
End: 2024-06-18

## 2024-06-18 VITALS
HEART RATE: 61 BPM | SYSTOLIC BLOOD PRESSURE: 116 MMHG | BODY MASS INDEX: 23.14 KG/M2 | WEIGHT: 130.63 LBS | DIASTOLIC BLOOD PRESSURE: 61 MMHG | HEIGHT: 63 IN

## 2024-06-18 DIAGNOSIS — M80.00XA AGE-RELATED OSTEOPOROSIS WITH CURRENT PATHOLOGICAL FRACTURE, INITIAL ENCOUNTER: ICD-10-CM

## 2024-06-18 DIAGNOSIS — E55.9 VITAMIN D DEFICIENCY: Primary | ICD-10-CM

## 2024-06-18 PROCEDURE — 1159F MED LIST DOCD IN RCRD: CPT | Performed by: INTERNAL MEDICINE

## 2024-06-18 PROCEDURE — 99214 OFFICE O/P EST MOD 30 MIN: CPT | Performed by: INTERNAL MEDICINE

## 2024-06-18 PROCEDURE — 3078F DIAST BP <80 MM HG: CPT | Performed by: INTERNAL MEDICINE

## 2024-06-18 PROCEDURE — 96372 THER/PROPH/DIAG INJ SC/IM: CPT | Performed by: INTERNAL MEDICINE

## 2024-06-18 PROCEDURE — 3008F BODY MASS INDEX DOCD: CPT | Performed by: INTERNAL MEDICINE

## 2024-06-18 PROCEDURE — 3074F SYST BP LT 130 MM HG: CPT | Performed by: INTERNAL MEDICINE

## 2024-06-18 RX ORDER — ERGOCALCIFEROL 1.25 MG/1
CAPSULE ORAL
Qty: 18 CAPSULE | Refills: 0 | Status: SHIPPED | OUTPATIENT
Start: 2024-06-18 | End: 2024-12-15
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
## 2024-06-29 ENCOUNTER — HOSPITAL ENCOUNTER (INPATIENT)
Facility: HOSPITAL | Age: 79
LOS: 7 days | Discharge: HOME HEALTH | End: 2024-07-06
Attending: EMERGENCY MEDICINE | Admitting: INTERNAL MEDICINE
Payer: MEDICARE

## 2024-06-29 ENCOUNTER — HOSPITAL ENCOUNTER (INPATIENT)
Facility: HOSPITAL | Age: 79
LOS: 7 days | Discharge: HOME | End: 2024-07-06
Attending: EMERGENCY MEDICINE | Admitting: INTERNAL MEDICINE
Payer: MEDICARE

## 2024-06-29 ENCOUNTER — APPOINTMENT (OUTPATIENT)
Dept: ULTRASOUND IMAGING | Facility: HOSPITAL | Age: 79
End: 2024-06-29
Attending: EMERGENCY MEDICINE
Payer: MEDICARE

## 2024-06-29 DIAGNOSIS — I82.4Y9 DEEP VEIN THROMBOSIS (DVT) OF PROXIMAL LOWER EXTREMITY, UNSPECIFIED CHRONICITY, UNSPECIFIED LATERALITY (HCC): ICD-10-CM

## 2024-06-29 DIAGNOSIS — M16.11 PRIMARY OSTEOARTHRITIS OF RIGHT HIP: ICD-10-CM

## 2024-06-29 DIAGNOSIS — R53.1 WEAKNESS: ICD-10-CM

## 2024-06-29 DIAGNOSIS — M54.16 LUMBAR RADICULOPATHY: ICD-10-CM

## 2024-06-29 DIAGNOSIS — G57.02 COMMON PERONEAL NEUROPATHY OF LEFT LOWER EXTREMITY: ICD-10-CM

## 2024-06-29 DIAGNOSIS — M79.89 LEG SWELLING: Primary | ICD-10-CM

## 2024-06-29 DIAGNOSIS — M48.061 FORAMINAL STENOSIS OF LUMBAR REGION: ICD-10-CM

## 2024-06-29 PROBLEM — D64.9 ANEMIA: Status: ACTIVE | Noted: 2024-06-29

## 2024-06-29 LAB
ANION GAP SERPL CALC-SCNC: 7 MMOL/L (ref 0–18)
ATRIAL RATE: 101 BPM
BASOPHILS # BLD AUTO: 0.01 X10(3) UL (ref 0–0.2)
BASOPHILS NFR BLD AUTO: 0.1 %
BNP SERPL-MCNC: 55 PG/ML
BUN BLD-MCNC: 11 MG/DL (ref 9–23)
BUN/CREAT SERPL: 13.3 (ref 10–20)
CALCIUM BLD-MCNC: 8.7 MG/DL (ref 8.7–10.4)
CHLORIDE SERPL-SCNC: 109 MMOL/L (ref 98–112)
CO2 SERPL-SCNC: 26 MMOL/L (ref 21–32)
CREAT BLD-MCNC: 0.83 MG/DL
DEPRECATED RDW RBC AUTO: 42.5 FL (ref 35.1–46.3)
EGFRCR SERPLBLD CKD-EPI 2021: 72 ML/MIN/1.73M2 (ref 60–?)
EOSINOPHIL # BLD AUTO: 0.05 X10(3) UL (ref 0–0.7)
EOSINOPHIL NFR BLD AUTO: 0.7 %
ERYTHROCYTE [DISTWIDTH] IN BLOOD BY AUTOMATED COUNT: 12.8 % (ref 11–15)
GLUCOSE BLD-MCNC: 98 MG/DL (ref 70–99)
HCT VFR BLD AUTO: 30.8 %
HGB BLD-MCNC: 10 G/DL
IMM GRANULOCYTES # BLD AUTO: 0.02 X10(3) UL (ref 0–1)
IMM GRANULOCYTES NFR BLD: 0.3 %
LYMPHOCYTES # BLD AUTO: 3.36 X10(3) UL (ref 1–4)
LYMPHOCYTES NFR BLD AUTO: 49.1 %
MAGNESIUM SERPL-MCNC: 2.5 MG/DL (ref 1.6–2.6)
MCH RBC QN AUTO: 29.9 PG (ref 26–34)
MCHC RBC AUTO-ENTMCNC: 32.5 G/DL (ref 31–37)
MCV RBC AUTO: 91.9 FL
MONOCYTES # BLD AUTO: 0.34 X10(3) UL (ref 0.1–1)
MONOCYTES NFR BLD AUTO: 5 %
NEUTROPHILS # BLD AUTO: 3.06 X10 (3) UL (ref 1.5–7.7)
NEUTROPHILS # BLD AUTO: 3.06 X10(3) UL (ref 1.5–7.7)
NEUTROPHILS NFR BLD AUTO: 44.8 %
OSMOLALITY SERPL CALC.SUM OF ELEC: 293 MOSM/KG (ref 275–295)
P AXIS: 49 DEGREES
P-R INTERVAL: 128 MS
PLATELET # BLD AUTO: 217 10(3)UL (ref 150–450)
POTASSIUM SERPL-SCNC: 4.6 MMOL/L (ref 3.5–5.1)
Q-T INTERVAL: 358 MS
QRS DURATION: 72 MS
QTC CALCULATION (BEZET): 464 MS
R AXIS: 29 DEGREES
RBC # BLD AUTO: 3.35 X10(6)UL
SODIUM SERPL-SCNC: 142 MMOL/L (ref 136–145)
T AXIS: 68 DEGREES
TROPONIN I SERPL HS-MCNC: 8 NG/L
VENTRICULAR RATE: 101 BPM
WBC # BLD AUTO: 6.8 X10(3) UL (ref 4–11)

## 2024-06-29 PROCEDURE — 93970 EXTREMITY STUDY: CPT | Performed by: EMERGENCY MEDICINE

## 2024-06-29 RX ORDER — ACETAMINOPHEN 325 MG/1
325 TABLET ORAL EVERY 6 HOURS PRN
Status: ON HOLD | COMMUNITY

## 2024-06-29 RX ORDER — METHOCARBAMOL 750 MG/1
750 TABLET, FILM COATED ORAL EVERY 6 HOURS PRN
Status: DISCONTINUED | OUTPATIENT
Start: 2024-06-29 | End: 2024-07-06

## 2024-06-29 RX ORDER — OXYCODONE HYDROCHLORIDE 5 MG/1
5 TABLET ORAL EVERY 6 HOURS PRN
COMMUNITY
End: 2024-07-06

## 2024-06-29 RX ORDER — OXYCODONE HYDROCHLORIDE 5 MG/1
5 TABLET ORAL EVERY 6 HOURS PRN
Status: DISCONTINUED | OUTPATIENT
Start: 2024-06-29 | End: 2024-07-06

## 2024-06-29 RX ORDER — ACETAMINOPHEN 325 MG/1
325 TABLET ORAL EVERY 6 HOURS PRN
Status: DISCONTINUED | OUTPATIENT
Start: 2024-06-29 | End: 2024-07-06

## 2024-06-29 RX ORDER — METHOCARBAMOL 750 MG/1
750 TABLET, FILM COATED ORAL EVERY 6 HOURS PRN
Status: ON HOLD | COMMUNITY
End: 2024-07-12

## 2024-06-29 RX ORDER — GABAPENTIN 100 MG/1
100 CAPSULE ORAL 3 TIMES DAILY
Status: DISCONTINUED | OUTPATIENT
Start: 2024-06-30 | End: 2024-06-30

## 2024-06-29 RX ORDER — HEPARIN SODIUM 5000 [USP'U]/ML
5000 INJECTION, SOLUTION INTRAVENOUS; SUBCUTANEOUS EVERY 12 HOURS SCHEDULED
Status: DISCONTINUED | OUTPATIENT
Start: 2024-06-29 | End: 2024-06-30

## 2024-06-29 NOTE — ED INITIAL ASSESSMENT (HPI)
Pt reports bilateral leg swelling and left leg pain that started yesterday. Pt states she had  back surgery on Monday and was discharge from the hospital yesterday. Pt reports cp that started yesterday also. Pt reports no fever/N/V.

## 2024-06-29 NOTE — ED PROVIDER NOTES
[facility]  Emergency Department Attending Note     Chief Complaint:   Chief Complaint   Patient presents with    Swelling Edema     HISTORY OF PRESENT ILLNESS:   Tess Melendez is a 78 year old female who presents to the ED with past medical history including  scoliosis, 5 days postop L4-5 lumbar fusion at [facility] presents with complaint of difficulty ambulating at home.  Family visited her today as she was discharged from the hospital yesterday and she had urinated on herself because she could not get to the bathroom.  She states that she did not want to be discharged from the hospital because she felt she needed more physical therapy.  Denies any incontinence.  Denies nausea vomiting diarrhea.     MEDICAL & SOCIAL HISTORY:   Past Medical History:    Endometriosis    Parkinson's disease (HCC)    pt denies -- states her 's diagnosis    Pelvic kidney    left side    Scoliosis      Past Surgical History:   Procedure Laterality Date    Hysterectomy      partial in her 30s for endometriosis    Other surgical history  2018    Right hip replacement      Social History     Socioeconomic History    Marital status:    Tobacco Use    Smoking status: Never    Smokeless tobacco: Never   Vaping Use    Vaping status: Never Used   Substance and Sexual Activity    Alcohol use: No    Drug use: No     Social Determinants of Health      [facility]    Social Connections    [facility]    Housing Stability    No Known Allergies   Current Outpatient Medications   Medication Sig Dispense Refill    ergocalciferol 1.25 MG (62908 UT) Oral Cap Take 1 capsule (50,000 Units total) by mouth once a week for 90 days, THEN 1 capsule (50,000 Units total) every 14 (fourteen) days. 18 capsule 0    Cholecalciferol 25 MCG (1000 UT) Oral Tab Take 1 tablet (1,000 Units total) by mouth daily. 90  tablet 3    gabapentin 300 MG Oral Cap Take 100 mg by mouth 3 (three) times daily.      acetaminophen 500 MG Oral Tab Take 500 mg by mouth every 6 (six) hours as needed for Pain.      Multiple Vitamins-Minerals (ONE-A-DAY WOMENS 50+ ADVANTAGE) Oral Tab Take 1 tablet by mouth daily.            REVIEW OF SYSTEMS   A 10 point review of systems was completed and is negative except as listed in history of present illness      PHYSICAL EXAM   Vitals: /73   Pulse 76   Temp 97.9 °F (36.6 °C) (Temporal)   Resp 22   Ht 162.6 cm (5' 4\")   Wt 59 kg   SpO2 99%   BMI 22.31 kg/m²   /73   Pulse 76   Temp 97.9 °F (36.6 °C) (Temporal)   Resp 22   Ht 162.6 cm (5' 4\")   Wt 59 kg   SpO2 99%   BMI 22.31 kg/m²     General: A&Ox3, NAD  Constitutional: Well developed, well nourished, nontoxic  Head: atraumatic, normocephalic   Eyes: conjuctiva clear, no icterus, PERRL, EOMI, vision grossly normal  Ears: normal external appearance, no drainage  Nose:  Atraumatic, no swelling, no drainage, nares patent  Throat:  Moist pink mucous membranes, airway is patent  Neck:  Soft supple, no masses, no tracheal deviation, no stridor  Chest:  No bruising or abrasions, no tenderness, no deformity  Cardiac:  Regular rate and rhythm, no murmurs rubs or gallops.  Lung:  No distress, no retractions. Clear to auscultation bilaterally, no w/r/r  Abdomen:  Soft, nontender, nondistended, normal BS  Back: No stepoff/deformity surgical site is clean dry intact no surrounding erythema exudate  Extremities: FROM all ext, no deformities, intact equal peripheral pulses, no cyanosis trace bilateral lower extremity edema somewhat worse on the left intact dorsalis pedis pulses  Neuro: No facial droop, no slurred speech, moving all extremities freely, SILT to the bilateral upper and lower extremities  Psych: A&Ox3, normal affect, cooperative, calm  Skin: No rash, no petechiae/purpura, warm, dry      RESULTS  LABS:   Results for orders placed or  performed during the hospital encounter of 06/29/24   Basic Metabolic Panel (8)   Result Value Ref Range    Glucose 98 70 - 99 mg/dL    Sodium 142 136 - 145 mmol/L    Potassium 4.6 3.5 - 5.1 mmol/L    Chloride 109 98 - 112 mmol/L    CO2 26.0 21.0 - 32.0 mmol/L    Anion Gap 7 0 - 18 mmol/L    BUN 11 9 - 23 mg/dL    Creatinine 0.83 0.55 - 1.02 mg/dL    BUN/CREA Ratio 13.3 10.0 - 20.0    Calcium, Total 8.7 8.7 - 10.4 mg/dL    Calculated Osmolality 293 275 - 295 mOsm/kg    eGFR-Cr 72 >=60 mL/min/1.73m2   Troponin I (High Sensitivity)   Result Value Ref Range    Troponin I (High Sensitivity) 8 <=34 ng/L   BNP (Brain Natriuretic Peptide)   Result Value Ref Range    Beta Natriuretic Peptide 55 <100 pg/mL   Magnesium   Result Value Ref Range    Magnesium 2.5 1.6 - 2.6 mg/dL   EKG 12 Lead   Result Value Ref Range    Ventricular rate 101 BPM    Atrial rate 101 BPM    P-R Interval 128 ms    QRS Duration 72 ms    Q-T Interval 358 ms    QTC Calculation (Bezet) 464 ms    P Axis 49 degrees    R Axis 29 degrees    T Axis 68 degrees   CBC W/ DIFFERENTIAL   Result Value Ref Range    WBC 6.8 4.0 - 11.0 x10(3) uL    RBC 3.35 (L) 3.80 - 5.30 x10(6)uL    HGB 10.0 (L) 12.0 - 16.0 g/dL    HCT 30.8 (L) 35.0 - 48.0 %    MCV 91.9 80.0 - 100.0 fL    MCH 29.9 26.0 - 34.0 pg    MCHC 32.5 31.0 - 37.0 g/dL    RDW-SD 42.5 35.1 - 46.3 fL    RDW 12.8 11.0 - 15.0 %    .0 150.0 - 450.0 10(3)uL    Neutrophil Absolute Prelim 3.06 1.50 - 7.70 x10 (3) uL    Neutrophil Absolute 3.06 1.50 - 7.70 x10(3) uL    Lymphocyte Absolute 3.36 1.00 - 4.00 x10(3) uL    Monocyte Absolute 0.34 0.10 - 1.00 x10(3) uL    Eosinophil Absolute 0.05 0.00 - 0.70 x10(3) uL    Basophil Absolute 0.01 0.00 - 0.20 x10(3) uL    Immature Granulocyte Absolute 0.02 0.00 - 1.00 x10(3) uL    Neutrophil % 44.8 %    Lymphocyte % 49.1 %    Monocyte % 5.0 %    Eosinophil % 0.7 %    Basophil % 0.1 %    Immature Granulocyte % 0.3 %         IMAGING: US VENOUS DOPPLER LEG BILAT - DIAG IMG  (HXK=36949)    Result Date: 6/29/2024  CONCLUSION:  1.  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  Right lower extremity deep venous system is otherwise patent. 2.  No evidence of left lower extremity DVT.   Dictated by (CST): Valentin Acevedo MD on 6/29/2024 at 4:19 PM     Finalized by (CST): Valentin Acevedo MD on 6/29/2024 at 4:22 PM           I personally reviewed the radiology study and my finding were right lower extremity small saphenous vein has a chronic thrombus no evidence of acute DVT      Procedures:   Procedures    EKG: Normal sinus rhythm with a tachycardic rate of 101, normal intervals normal QRS nonspecific ST-T wave changes without overt signs of acute ischemia, no old immediately available     ED COURSE          Re-Evaluation: Improved      Disposition & Plan:   Clinical Impression/Final Diagnosis:   1. Leg swelling    2. Weakness        Medical Decision Making: Patient states that she lives at home alone and has stairs in the house and is not comfortable going home.  Will discuss with admitting admit for further workup and management, may benefit from physical therapy.  Patient states she does not want to be transferred to [facility].  Given the DVT that has not been demonstrated in the past, will admit for further evaluation of DVT, generalized weakness, pain control for postsurgical back pain discussed with Dr. HESS    Medical Decision Making  Amount and/or Complexity of Data Reviewed  Independent Historian:      Details: Family at the bedside  External Data Reviewed: notes.  Labs: ordered. Decision-making details documented in ED Course.  Radiology: ordered and independent interpretation performed. Decision-making details documented in ED Course.  ECG/medicine tests: ordered and independent interpretation performed. Decision-making details documented in ED Course.    Risk  OTC drugs.  Prescription drug management.  Decision regarding hospitalization.  Diagnosis or  treatment significantly limited by social determinants of health.        Disposition: Admit  There are no disposition comments on file for this visit.     This note was generated in part using voice recognition dictation technology. The report was reviewed by this physician but still may have unintentional errors due to inherent limitations of voice recognition technology. All times are estimates.

## 2024-06-30 ENCOUNTER — APPOINTMENT (OUTPATIENT)
Dept: GENERAL RADIOLOGY | Facility: HOSPITAL | Age: 79
End: 2024-06-30
Attending: INTERNAL MEDICINE
Payer: MEDICARE

## 2024-06-30 LAB
VIT B12 SERPL-MCNC: 590 PG/ML (ref 211–911)
VIT D+METAB SERPL-MCNC: 32.5 NG/ML (ref 30–100)

## 2024-06-30 PROCEDURE — 71045 X-RAY EXAM CHEST 1 VIEW: CPT | Performed by: INTERNAL MEDICINE

## 2024-06-30 RX ORDER — GABAPENTIN 300 MG/1
300 CAPSULE ORAL 3 TIMES DAILY
Status: DISCONTINUED | OUTPATIENT
Start: 2024-06-30 | End: 2024-07-06

## 2024-06-30 RX ORDER — GABAPENTIN 300 MG/1
600 CAPSULE ORAL 3 TIMES DAILY
Status: DISCONTINUED | OUTPATIENT
Start: 2024-06-30 | End: 2024-06-30 | Stop reason: DRUGHIGH

## 2024-06-30 RX ORDER — SODIUM CHLORIDE 9 MG/ML
INJECTION, SOLUTION INTRAVENOUS CONTINUOUS
Status: ACTIVE | OUTPATIENT
Start: 2024-06-30 | End: 2024-07-01

## 2024-06-30 NOTE — H&P
[facility]  part of [facility]    Report of Admission    Tess Melendez Patient Status:  Inpatient    7/15/1945 MRN [id number]   Location [facility] 4W/SW/SE Attending Clarita Cardenas MD   Hosp Day # 0 PCP MARCIA NAVARRO     Date of Admission:  2024  Date of Consult:  2024    Reason for Admission:   Back pain leg numbness leg weakness poor mobility.  Patient status postop day 6 after L4-L5 MIS TLIF with repair of durotomy on 2024 at [facility].    History of Present Illness:   Patient is a 78 year old female who was admitted to the hospital for Leg swelling:  Ms. Melendez is a 78-year-old Afro-American lady who was recently discharged from hospital after a prolonged stay and a complicated back surgery which required incisions and second approach for fusion of her spine.  Patient was originally supposed to get a 1 hour procedure but subsequently due to complicated scoliosis underwent a 5-hour procedure from the contralateral side.  Postoperatively patient was in hospital for 5 days and progressively developed weakness in her lower extremity patient has also ever since reported heaviness and numbness of on both of her lower extremities.    Past Medical History  Past Medical History:    Endometriosis    Parkinson's disease (HCC)    pt denies -- states her 's diagnosis    Pelvic kidney    left side    Scoliosis       Past Surgical History  Past Surgical History:   Procedure Laterality Date    Hysterectomy      partial in her 30s for endometriosis    Other surgical history  2018    Right hip replacement       Family History  Family History   Problem Relation Age of Onset    Heart Attack Mother     Cancer Neg     Diabetes Neg     Hypertension Neg        Social History  Pediatric History   Patient Parents    Not on file     Other Topics Concern    Not on file   Social History Narrative    Not on file           Current  Medications:    Current Facility-Administered Medications:     oxyCODONE immediate release tab 5 mg, 5 mg, Oral, Q6H PRN    methocarbamol (Robaxin) tab 750 mg, 750 mg, Oral, Q6H PRN    [START ON 6/30/2024] gabapentin (Neurontin) cap 100 mg, 100 mg, Oral, TID    heparin (Porcine) 5000 UNIT/ML injection 5,000 Units, 5,000 Units, Subcutaneous, 2 times per day    acetaminophen (Tylenol) tab 325 mg, 325 mg, Oral, Q6H PRN  Medications Prior to Admission   Medication Sig    acetaminophen 325 MG Oral Tab Take 1 tablet (325 mg total) by mouth every 6 (six) hours as needed for Pain.    methocarbamol 750 MG Oral Tab Take 1 tablet (750 mg total) by mouth every 6 (six) hours as needed.    oxyCODONE 5 MG Oral Tab Take 1 tablet (5 mg total) by mouth every 6 (six) hours as needed for Pain. Oxycodone immediate release    gabapentin 300 MG Oral Cap Take 100 mg by mouth 3 (three) times daily.    ergocalciferol 1.25 MG (36222 UT) Oral Cap Take 1 capsule (50,000 Units total) by mouth once a week for 90 days, THEN 1 capsule (50,000 Units total) every 14 (fourteen) days.    Cholecalciferol 25 MCG (1000 UT) Oral Tab Take 1 tablet (1,000 Units total) by mouth daily.    acetaminophen 500 MG Oral Tab Take 500 mg by mouth every 6 (six) hours as needed for Pain.    Multiple Vitamins-Minerals (ONE-A-DAY WOMENS 50+ ADVANTAGE) Oral Tab Take 1 tablet by mouth daily.       Allergies  No Known Allergies    Review of Systems:    A comprehensive review of systems was negative except as per HPI    Physical Exam:     Vitals:    06/29/24 2249   BP: (!) 137/108   Pulse:    Resp:    Temp:      No intake or output data in the 24 hours ending 06/29/24 2345  Wt Readings from Last 2 Encounters:   06/29/24 135 lb 9.6 oz (61.5 kg)   06/18/24 130 lb 9.6 oz (59.2 kg)       General appearance:  alert, appears stated age, and cooperative  Head: Normocephalic, without obvious abnormality, atraumatic  Eyes: conjunctivae/corneas clear. PERRL, EOM's intact. Fundi  benign.  Ears: normal TM's and external ear canals both ears  Nose: Nares normal. Septum midline. Mucosa normal. No drainage or sinus tenderness.  Throat: lips, mucosa, and tongue normal; teeth and gums normal  Neck: no adenopathy, no carotid bruit, no JVD, supple, symmetrical, trachea midline, and thyroid not enlarged, symmetric, no tenderness/mass/nodules  Pulmonary: clear to auscultation bilaterally  Cardiovascular: S1, S2 normal, no murmur, click, rub or gallop, regular rate and rhythm  Abdominal: soft, non-tender; bowel sounds normal; no masses,  no organomegaly  Extremities: extremities normal, atraumatic, no cyanosis or edema  Pulses: 2+ and symmetric  Skin: Skin color, texture, turgor normal. No rashes or lesions  Neurologic: Grossly normal  Genital Exam: defer exam    Results:     Laboratory Data:  Lab Results   Component Value Date    WBC 6.8 06/29/2024    HGB 10.0 (L) 06/29/2024    HCT 30.8 (L) 06/29/2024    .0 06/29/2024     06/29/2024    K 4.6 06/29/2024     06/29/2024    CO2 26.0 06/29/2024    CREATSERUM 0.83 06/29/2024    BUN 11 06/29/2024    GLU 98 06/29/2024    CA 8.7 06/29/2024    ALB 4.4 06/11/2024    ALKPHO 79 06/11/2024    BILT 0.5 06/11/2024    TP 7.1 06/11/2024    AST 26 06/11/2024    ALT 10 06/11/2024    TSH 0.900 09/28/2021     08/07/2020    DDIMER 0.35 11/02/2021    ESRML 10 07/11/2022    CRP <0.29 07/11/2022    MG 2.5 06/29/2024    TROP <0.045 08/07/2020    B12 335 09/28/2021     No results found for this visit on 06/29/24.    Imaging:  US VENOUS DOPPLER LEG BILAT - DIAG IMG (CPT=93970)    Result Date: 6/29/2024  CONCLUSION:  1.  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  Right lower extremity deep venous system is otherwise patent. 2.  No evidence of left lower extremity DVT.   Dictated by (CST): Valentin Acevedo MD on 6/29/2024 at 4:19 PM     Finalized by (CST): Valentin Acevedo MD on 6/29/2024 at 4:22 PM              Impression:      Leg swelling  Patient has leg swelling part of this appears to be edema status postop.  Ultrasound is indicative of old superficial thrombosis and it is in the superficial system I do not suspect deep venous thrombosis.  For the time being we will monitor carefully avoid IV fluids will give dose of diuretic if needed.      Anemia  Anemia is likely postoperative, hematology is on consult I do not have any other source of blood loss.      Weakness  Weakness is likely multifactorial part of it is postop but I suspect neuro logical involvement postsurgery as well, preoperatively patient had been able to ambulate with some use of a walker.  Based on postoperative instructions from [facility] patient is supposed to wear a back corset and should be ambulating I have placed a PT consult back corset is at bedside nurse was instructed to call physical therapy.      Deep vein thrombosis (DVT) of proximal lower extremity, unspecified chronicity, unspecified laterality (HCC)  Patient does not have a DVT.      Acute pain: Patient will be on oxycodone acetaminophen Neurontin and methocarbamol.    DVT prophylaxis: Patient currently cannot receive anticoagulation or subcu heparin.  In light of no DVT and likely old superficial thrombosis, will place SCD boots.  Discussed with hematologist.    GI prophylaxis: Patient will be on PPI.    Patient currently does not want to follow-up with the primary surgeon at [facility] hence a second opinion has been requested from Dr. Grijalva and will await his recommendations.    Total time spent seeing patient was 70 minutes.  I had a detailed discussion with patient's granddaughter who is also a nurse.      Recommendations:  As above.    Thank you for allowing me to participate in the care of your patient.    FRANCIS PEREIRA MD  6/29/2024

## 2024-06-30 NOTE — CONSULTS
[facility]  part of [facility]    Report of Consultation    Tess Melendez Patient Status:  Inpatient    7/15/1945 MRN [id number]   Location [facility] 4W/SW/SE Attending Clarita Cardenas MD   Hosp Day # 1 PCP MARCIA NAVARRO     Date of Admission:  2024  Date of Consult:  24  Reason for Consultation:   Rt Chronic saphenous vein thrombus    History of Present Illness:   Patient is a 78 year old female who was admitted to the hospital for Leg swellin year old female who presents to the ED with past medical history including  scoliosis, 5 days postop L4-5 lumbar fusion at [facility] presents with complaint of difficulty ambulating at home.  Family visited her today as she was discharged from the hospital yesterday and she had urinated on herself because she could not get to the bathroom.   Pt is admitted for further management.   Pt reports Left leg swelling. S/P LE doppler ultrasound. Shows chronic Rt saphenous vein thrombus. Consult requested for further mnaagement.    Past Medical History  Past Medical History:    Endometriosis    Parkinson's disease (HCC)    pt denies -- states her 's diagnosis    Pelvic kidney    left side    Scoliosis       Past Surgical History  Past Surgical History:   Procedure Laterality Date    Hysterectomy      partial in her 30s for endometriosis    Other surgical history  2018    Right hip replacement       Family History  Family History   Problem Relation Age of Onset    Heart Attack Mother     Cancer Neg     Diabetes Neg     Hypertension Neg        Social History  Social History     Socioeconomic History    Marital status:    Tobacco Use    Smoking status: Never    Smokeless tobacco: Never   Vaping Use    Vaping status: Never Used   Substance and Sexual Activity    Alcohol use: No    Drug use: No     Social Determinants of Health     Food Insecurity: No Food Insecurity (2024)    Food Insecurity     Food  Insecurity: Never true   Transportation Needs: No Transportation Needs (6/30/2024)    Transportation Needs     Lack of Transportation: No    Received from [facility], [facility]    Social Connections   Housing Stability: Low Risk  (6/30/2024)    Housing Stability     Housing Instability: No        Current Medications:  Current Facility-Administered Medications   Medication Dose Route Frequency    gabapentin (Neurontin) cap 300 mg  300 mg Oral TID    oxyCODONE immediate release tab 5 mg  5 mg Oral Q6H PRN    methocarbamol (Robaxin) tab 750 mg  750 mg Oral Q6H PRN    heparin (Porcine) 5000 UNIT/ML injection 5,000 Units  5,000 Units Subcutaneous 2 times per day    acetaminophen (Tylenol) tab 325 mg  325 mg Oral Q6H PRN     Medications Prior to Admission   Medication Sig    acetaminophen 325 MG Oral Tab Take 1 tablet (325 mg total) by mouth every 6 (six) hours as needed for Pain.    methocarbamol 750 MG Oral Tab Take 1 tablet (750 mg total) by mouth every 6 (six) hours as needed.    oxyCODONE 5 MG Oral Tab Take 1 tablet (5 mg total) by mouth every 6 (six) hours as needed for Pain. Oxycodone immediate release    gabapentin 300 MG Oral Cap Take 100 mg by mouth 3 (three) times daily.    ergocalciferol 1.25 MG (89617 UT) Oral Cap Take 1 capsule (50,000 Units total) by mouth once a week for 90 days, THEN 1 capsule (50,000 Units total) every 14 (fourteen) days.    Cholecalciferol 25 MCG (1000 UT) Oral Tab Take 1 tablet (1,000 Units total) by mouth daily.    acetaminophen 500 MG Oral Tab Take 500 mg by mouth every 6 (six) hours as needed for Pain.    Multiple Vitamins-Minerals (ONE-A-DAY WOMENS 50+ ADVANTAGE) Oral Tab Take 1 tablet by mouth daily.       Allergies  No Known Allergies    Review of Systems:    REVIEW OF SYSTEMS    Constitutional Symptoms: Patient reports No fever. No rigors. No weight loss. No night sweats.  HEENT: Patient reports No blurred vision. No double vision. No  hearing loss. No mouth sores.  Respiratory: Patient reports No difficulty breathing reported. No wheezing. No cough. No hemoptysis.  Cardiovascular: Patient reports No chest pain. No palpitations. No edema.  Gastrointestinal: Patient reports No nausea. No vomiting. No dysphagia. No heartburn. No abdominal pain. No diarrhea. No constipation.  No melena. No hematochezia.  Genitourinary: Patient reports No hematuria. No dysuria. No urgency. No incontinence.  Musculoskeletal: Patient reports No bone pain. No myalgia. + back pain. No arthralgia. No joint swelling. Difficulty walking+  Integumentary: Patient reports No rash. No pruritis.  Neurological: Patient reports No headache. No paralysis. No paresthesias. No speech impairment.  Hematologic: Patient reports No excessive or spontaneous bleeding or bruising.  Mental Health: Patient reports No anxiety? no depression? no insomnia? no panic disorder.    Physical Exam:   Blood pressure 141/58, pulse 70, temperature 98 °F (36.7 °C), temperature source Temporal, resp. rate 16, height 5' 4\" (1.626 m), weight 135 lb 9.6 oz (61.5 kg), SpO2 97%.    PHYSICAL EXAM  General: Well developed, well nourished, in no acute distress. Patient appears stated age.  Skin: No abnormal skin lesions noted.  Head: Normocephalic.  Eyes: Sclerae are anicteric.  Ears, Nose, Throat, and Mouth: Normal oral mucosa and oropharynx.  Neck: Neck is supple. No cervical masses present. Trachea is midline. No thyromegaly.  Lungs: clear to auscultation bilaterally.  Cardiac: normal rate? regular rhythm. S1/S2 without murmurs.  Abdomen: Abdomen is soft, nontender, nondistended, without masses. No hepatomegaly. No splenomegaly. No ascites present. Bowel sounds active.  Hematologic/Lymphatic: No petechiae. No purpura. No cervical ANTWAN.  Extremities: mild LE edema. No cyanosis. No digital clubbing. No discoloration.  Musculoskeletal: Normal range of motion. Strength and tone are normal.Neurologic: No focal  findings on screening exam.    Results:     Laboratory Data:  Lab Results   Component Value Date    WBC 6.8 06/29/2024    HGB 10.0 (L) 06/29/2024    HCT 30.8 (L) 06/29/2024    .0 06/29/2024    CREATSERUM 0.83 06/29/2024    BUN 11 06/29/2024     06/29/2024    K 4.6 06/29/2024     06/29/2024    CO2 26.0 06/29/2024    GLU 98 06/29/2024    CA 8.7 06/29/2024    ALB 4.4 06/11/2024    ALKPHO 79 06/11/2024    TP 7.1 06/11/2024    AST 26 06/11/2024    ALT 10 06/11/2024    TSH 0.900 09/28/2021     08/07/2020    DDIMER 0.35 11/02/2021    ESRML 10 07/11/2022    CRP <0.29 07/11/2022    MG 2.5 06/29/2024    TROP <0.045 08/07/2020    TROPHS 8 06/29/2024    B12 335 09/28/2021         Imaging:  US VENOUS DOPPLER LEG BILAT - DIAG IMG (CPT=93970)    Result Date: 6/29/2024  CONCLUSION:  1.  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  Right lower extremity deep venous system is otherwise patent. 2.  No evidence of left lower extremity DVT.   Dictated by (CST): Valentin Acevedo MD on 6/29/2024 at 4:19 PM     Finalized by (CST): Valentin Acevedo MD on 6/29/2024 at 4:22 PM              Impression:     LE weakness:  - worsening LE weakness after surgery.   - Pending neurosurgery evaluation.    2. Saphenous vein chronic thrombus:  - Given the superficial vein thrombus, anticoagulation is not indicated.  - will not initiate aspirin given recent surgery and the chronic thrombus.      Thank you for allowing me to participate in the care of your patient.    Alphonso Turcios MD  6/30/2024

## 2024-06-30 NOTE — OCCUPATIONAL THERAPY NOTE
Orders received, chart reviewed for OT evaluation.  Pt admitted with leg weakness, generalized weakness. Pt underwent L4-5 MIS TLIF with repair of durotomy on 6/24 with Dr. Peterson at outside hospital and was discharged 6/28. Notes from outside hospital indicate pt to have \"lumbar corset to wear when out of bed\".    Per RN, pt is still pending neurosurgery consult. Will hold off on evaluation pending further medical plan.   Also spoke with pt at bedside -- pt does not have lumbar corset at bedside and is aware to ask family to bring to the hospital. Will follow-up tomorrow.

## 2024-06-30 NOTE — PLAN OF CARE
Patient is Post op day 6. A&Ox4. RA. General diet. Saline locked. Heparin for dvt prophylaxis, Scd on left left. Voiding via purewick. PRN Robaxin, & Oxycodone for pain management. Up by 1 assist with a walker. Call light within reach, frequent rounding. Safety measures in place. Plan for ortho eval in am.       Problem: Patient Centered Care  Goal: Patient preferences are identified and integrated in the patient's plan of care  Description: Interventions:  - What would you like us to know as we care for you?   - Provide timely, complete, and accurate information to patient/family  - Incorporate patient and family knowledge, values, beliefs, and cultural backgrounds into the planning and delivery of care  - Encourage patient/family to participate in care and decision-making at the level they choose  - Honor patient and family perspectives and choices  Outcome: Progressing

## 2024-06-30 NOTE — PLAN OF CARE
Patient is alert and oriented times 4. Forgetful. Breathing on room air. Vitals being monitored. Encouraged ambulation. Voiding via purewick. Therapy on board. Call light within reach. Safety precautions in place.      Problem: Patient Centered Care  Goal: Patient preferences are identified and integrated in the patient's plan of care  Description: Interventions:  - What would you like us to know as we care for you? Be able to ambulate  - Provide timely, complete, and accurate information to patient/family  - Incorporate patient and family knowledge, values, beliefs, and cultural backgrounds into the planning and delivery of care  - Encourage patient/family to participate in care and decision-making at the level they choose  - Honor patient and family perspectives and choices  Outcome: Progressing     Problem: PAIN - ADULT  Goal: Verbalizes/displays adequate comfort level or patient's stated pain goal  Description: INTERVENTIONS:  - Encourage pt to monitor pain and request assistance  - Assess pain using appropriate pain scale  - Administer analgesics based on type and severity of pain and evaluate response  - Implement non-pharmacological measures as appropriate and evaluate response  - Consider cultural and social influences on pain and pain management  - Manage/alleviate anxiety  - Utilize distraction and/or relaxation techniques  - Monitor for opioid side effects  - Notify MD/LIP if interventions unsuccessful or patient reports new pain  - Anticipate increased pain with activity and pre-medicate as appropriate  Outcome: Progressing     Problem: SAFETY ADULT - FALL  Goal: Free from fall injury  Description: INTERVENTIONS:  - Assess pt frequently for physical needs  - Identify cognitive and physical deficits and behaviors that affect risk of falls.  - [place] fall precautions as indicated by assessment.  - Educate pt/family on patient safety including physical limitations  - Instruct pt to call for assistance  with activity based on assessment  - Modify environment to reduce risk of injury  - Provide assistive devices as appropriate  - Consider OT/PT consult to assist with strengthening/mobility  - Encourage toileting schedule  Outcome: Progressing

## 2024-06-30 NOTE — PROGRESS NOTES
[facility] Pharmacy Note:  Renal Dose Adjustment    Tess Melendez has been taking from home  Gabapentin   600   mg orally 3 times daily.    Estimated Creatinine Clearance: 48.2 mL/min (based on SCr of 0.83 mg/dL).    Her calculated creatinine clearance is <50 ml/min, therefore the dose of  Gabapentin  has been changed to  300 mg  orally 3 times daily.    Thank you,  Evette Munroe, PharmD  6/30/2024 12:57 AM

## 2024-06-30 NOTE — PHYSICAL THERAPY NOTE
PT order functional mobility screening received, chart reviewed.  Pt admitted with leg weakness, generalized weakness. Pt underwent L4-5 MIS TLIF with repair of durotomy on 6/24 with Dr. Peterson at outside hospital and was discharged 6/28. Notes from outside hospital indicate pt to have \"lumbar corset to wear when out of bed\". Pt reported at that time her brother would be coming to stay for 1-2 weeks with her although she normally lives alone.     Per RN, pt is still pending neurosurgery consult. Will hold off on evaluation pending further medical plan.   Also spoke with pt at bedside -- pt does not have lumbar corset at bedside and is aware to ask family to bring to the hospital. Will follow-up tomorrow.

## 2024-07-01 LAB
ALBUMIN SERPL-MCNC: 3.6 G/DL (ref 3.2–4.8)
ALBUMIN/GLOB SERPL: 1.6 {RATIO} (ref 1–2)
ALP LIVER SERPL-CCNC: 95 U/L
ALT SERPL-CCNC: 37 U/L
ANION GAP SERPL CALC-SCNC: 7 MMOL/L (ref 0–18)
AST SERPL-CCNC: 48 U/L (ref ?–34)
ATRIAL RATE: 68 BPM
BILIRUB SERPL-MCNC: 0.4 MG/DL (ref 0.2–1.1)
BUN BLD-MCNC: 8 MG/DL (ref 9–23)
BUN/CREAT SERPL: 11 (ref 10–20)
CALCIUM BLD-MCNC: 8.5 MG/DL (ref 8.7–10.4)
CHLORIDE SERPL-SCNC: 110 MMOL/L (ref 98–112)
CO2 SERPL-SCNC: 24 MMOL/L (ref 21–32)
CREAT BLD-MCNC: 0.73 MG/DL
DEPRECATED RDW RBC AUTO: 41.9 FL (ref 35.1–46.3)
EGFRCR SERPLBLD CKD-EPI 2021: 84 ML/MIN/1.73M2 (ref 60–?)
ERYTHROCYTE [DISTWIDTH] IN BLOOD BY AUTOMATED COUNT: 12.7 % (ref 11–15)
GLOBULIN PLAS-MCNC: 2.2 G/DL (ref 2–3.5)
GLUCOSE BLD-MCNC: 92 MG/DL (ref 70–99)
HCT VFR BLD AUTO: 29.6 %
HGB BLD-MCNC: 9.8 G/DL
MAGNESIUM SERPL-MCNC: 2 MG/DL (ref 1.6–2.6)
MCH RBC QN AUTO: 30.2 PG (ref 26–34)
MCHC RBC AUTO-ENTMCNC: 33.1 G/DL (ref 31–37)
MCV RBC AUTO: 91.1 FL
OSMOLALITY SERPL CALC.SUM OF ELEC: 290 MOSM/KG (ref 275–295)
P AXIS: 68 DEGREES
P-R INTERVAL: 122 MS
PHOSPHATE SERPL-MCNC: 2.6 MG/DL (ref 2.4–5.1)
PLATELET # BLD AUTO: 280 10(3)UL (ref 150–450)
POTASSIUM SERPL-SCNC: 4.2 MMOL/L (ref 3.5–5.1)
PROT SERPL-MCNC: 5.8 G/DL (ref 5.7–8.2)
Q-T INTERVAL: 418 MS
QRS DURATION: 68 MS
QTC CALCULATION (BEZET): 444 MS
R AXIS: 34 DEGREES
RBC # BLD AUTO: 3.25 X10(6)UL
SODIUM SERPL-SCNC: 141 MMOL/L (ref 136–145)
T AXIS: 77 DEGREES
VENTRICULAR RATE: 68 BPM
WBC # BLD AUTO: 6.7 X10(3) UL (ref 4–11)

## 2024-07-01 NOTE — PROGRESS NOTES
[facility]  part of [facility]    Progress Note    Tess Melendez Patient Status:  Inpatient    7/15/1945 MRN [id number]   Location [facility] 4W/SW/SE Attending Clarita Cardenas MD   Hosp Day # 1 PCP MARCIA NAVARRO       Subjective:   Tess Melendez is a(n) 78 year old female     Objective:     Vitals:    24 1523   BP: 129/59   Pulse: 85   Resp: 16   Temp: 98.1 °F (36.7 °C)       Intake/Output Summary (Last 24 hours) at 2024  Last data filed at 2024 1254  Gross per 24 hour   Intake --   Output 900 ml   Net -900 ml     Wt Readings from Last 2 Encounters:   24 135 lb 9.6 oz (61.5 kg)   24 130 lb 9.6 oz (59.2 kg)       General appearance:  alert, appears stated age, and cooperative  Head: Normocephalic, without obvious abnormality, atraumatic  Eyes: conjunctivae/corneas clear. PERRL, EOM's intact. Fundi benign.  Neck: no adenopathy, no carotid bruit, no JVD, supple, symmetrical, trachea midline, and thyroid not enlarged, symmetric, no tenderness/mass/nodules  Pulmonary: clear to auscultation bilaterally  Cardiovascular: S1, S2 normal, no murmur, click, rub or gallop, regular rate and rhythm  Abdominal: soft, non-tender; bowel sounds normal; no masses,  no organomegaly  Extremities: extremities normal, atraumatic, no cyanosis or edema  Pulses: 2+ and symmetric  Neurologic: Grossly normal  Genital Exam: defer exam    Current Medications:    Current Facility-Administered Medications:     gabapentin (Neurontin) cap 300 mg, 300 mg, Oral, TID    sodium chloride 0.9% infusion, , Intravenous, Continuous    oxyCODONE immediate release tab 5 mg, 5 mg, Oral, Q6H PRN    methocarbamol (Robaxin) tab 750 mg, 750 mg, Oral, Q6H PRN    acetaminophen (Tylenol) tab 325 mg, 325 mg, Oral, Q6H PRN    Allergies  No Known Allergies    Assessment and Plan:     Leg swelling  Patient has leg swelling part of this appears to be edema status postop.  Ultrasound is indicative of  old superficial thrombosis and it is in the superficial system I do not suspect deep venous thrombosis.  For the time being we will monitor carefully avoid IV fluids will give dose of diuretic if needed.       Anemia  Anemia is likely postoperative, hematology is on consult I do not have any other source of blood loss.       Weakness  Weakness is likely multifactorial part of it is postop but I suspect neuro logical involvement postsurgery as well, preoperatively patient had been able to ambulate with some use of a walker.  Based on postoperative instructions from [facility] patient is supposed to wear a back corset and should be ambulating I have placed a PT consult back corset is at bedside nurse was instructed to call physical therapy.       Deep vein thrombosis (DVT) of proximal lower extremity, unspecified chronicity, unspecified laterality (HCC)  Patient does not have a DVT.        Acute pain: Patient will be on oxycodone acetaminophen Neurontin and methocarbamol.     DVT prophylaxis: Patient currently cannot receive anticoagulation or subcu heparin.  In light of no DVT and likely old superficial thrombosis, will place SCD boots.  Discussed with hematologist.     GI prophylaxis: Patient will be on PPI.     Patient currently does not want to follow-up with the primary surgeon at [facility] hence a second opinion has been requested from Dr. Grijalva and will await his recommendations.  Results:     Lab Results   Component Value Date    WBC 6.8 06/29/2024    HGB 10.0 (L) 06/29/2024    HCT 30.8 (L) 06/29/2024    .0 06/29/2024     06/29/2024    K 4.6 06/29/2024     06/29/2024    CO2 26.0 06/29/2024    CREATSERUM 0.83 06/29/2024    BUN 11 06/29/2024    GLU 98 06/29/2024    CA 8.7 06/29/2024    ALB 4.4 06/11/2024    ALKPHO 79 06/11/2024    BILT 0.5 06/11/2024    TP 7.1 06/11/2024    AST 26 06/11/2024    ALT 10 06/11/2024    TSH 0.900 09/28/2021     08/07/2020    DDIMER  0.35 11/02/2021    ESRML 10 07/11/2022    CRP <0.29 07/11/2022    MG 2.5 06/29/2024    TROP <0.045 08/07/2020    B12 590 06/30/2024     No results found for this visit on 06/29/24.    XR CHEST AP/PA (1 VIEW) (CPT=71045)    Result Date: 6/30/2024  CONCLUSION:  1. Small left pleural effusion with probable associated compressive atelectasis at the left lung base. 2. Stable chronic elevation of the left hemidiaphragm.   Dictated by (CST): Cipriano Cruz MD on 6/30/2024 at 1:44 PM     Finalized by (CST): Cipriano Cruz MD on 6/30/2024 at 1:52 PM          US VENOUS DOPPLER LEG BILAT - DIAG IMG (CPT=93970)    Result Date: 6/29/2024  CONCLUSION:  1.  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  Right lower extremity deep venous system is otherwise patent. 2.  No evidence of left lower extremity DVT.   Dictated by (CST): Valentin Acevedo MD on 6/29/2024 at 4:19 PM     Finalized by (CST): Valentin Acevedo MD on 6/29/2024 at 4:22 PM         EKG 12 Lead    Result Date: 6/30/2024  Normal sinus rhythm Normal ECG When compared with ECG of 29-JUN-2024 12:42, Vent. rate has decreased BY  33 BPM    EKG 12 Lead    Result Date: 6/29/2024  Sinus tachycardia Otherwise normal ECG When compared with ECG of 05-JAN-2023 07:12, Vent. rate has increased BY  34 BPM Confirmed by UNIQUE PEREIRA ([id number]) on 6/29/2024 3:16:00 PM     **Certification      PHYSICIAN Certification of Need for Inpatient Hospitalization - Initial Certification    Patient will require inpatient services that will reasonably be expected to span two midnight's based on the clinical documentation in H+P.   Based on patients current state of illness, I anticipate that, after discharge, patient will require TBD.        FRANCIS PEREIRA MD  6/30/2024

## 2024-07-01 NOTE — PHYSICAL THERAPY NOTE
PHYSICAL THERAPY EVALUATION - INPATIENT     Room Number: 410/410-A  Evaluation Date: 7/1/2024  Type of Evaluation: Initial   Physician Order: PT Eval and Treat    Presenting Problem: leg swelling and weakness  Co-Morbidities : s/p lumbar fusion L4-5, R LE DVT, s/p R JEREMIAS  Reason for Therapy: Mobility Dysfunction and Discharge Planning    PHYSICAL THERAPY ASSESSMENT   Patient is a 78 year old female admitted 6/29/2024 for bilateral leg swelling and weakness.  Prior to admission, patient's baseline is mod I with use of rollator and/or cane for ambulation.  Patient is currently functioning below baseline with bed mobility, transfers, gait, stair negotiation, standing prolonged periods, and performing household tasks.  Patient is requiring minimal assist and moderate assist as a result of the following impairments: decreased functional strength, decreased endurance/aerobic capacity, pain, impaired standing balance, decreased muscular endurance, and medical status.  Physical Therapy will continue to follow for duration of hospitalization.    Patient will benefit from continued skilled PT Services to promote return to prior level of function and safety with continuous assistance and gradual rehabilitative therapy .    PLAN  PT Treatment Plan: Bed mobility;Body mechanics;Endurance;Energy conservation;Patient education;Gait training;Strengthening;Transfer training  Rehab Potential : Good  Frequency (Obs): Daily    PHYSICAL THERAPY MEDICAL/SOCIAL HISTORY   Problem List  Principal Problem:    Leg swelling  Active Problems:    Anemia    Weakness    Deep vein thrombosis (DVT) of proximal lower extremity, unspecified chronicity, unspecified laterality (HCC)      HOME SITUATION  Home Situation  Type of Home: House  Home Layout: Two level  Stairs to Enter : 2  Railing: Yes  Stairs to Bedroom: 12 (7 stairs then platform then 5 more stairs)  Railing: Yes  Lives With: Alone  Drives: No  Patient Owned Equipment: Rollator;Cane  Patient  Regularly Uses: None     SUBJECTIVE  \"My right thigh hurts because of my last hip surgery\"    PHYSICAL THERAPY EXAMINATION   OBJECTIVE  Precautions: Lumbar brace;Bed/chair alarm;Spine  Fall Risk: High fall risk    WEIGHT BEARING RESTRICTION  Weight Bearing Restriction: L lower extremity;R lower extremity  R Lower Extremity: Full Weight Bearing  L Lower Extremity: Full Weight Bearing    PAIN ASSESSMENT  Ratin (7/10 on L LE and 6-7/10 on R LE)  Location: BLE  Management Techniques: Relaxation;Repositioning;Body mechanics    COGNITION  Attention Span:  attends with cues to redirect    RANGE OF MOTION AND STRENGTH ASSESSMENT  Upper extremity ROM and strength are within functional limits   Lower extremity ROM is within functional limits except for the following: RLE and LLE, limited due to pain.  Lower extremity strength is within functional limits except for the following:  RLE and LLE, limited due to pain    BALANCE  Static Sitting: Fair +  Dynamic Sitting: Fair  Static Standing: Fair  Dynamic Standing: Fair -    AM-PAC '6-Clicks' INPATIENT SHORT FORM - BASIC MOBILITY  How much difficulty does the patient currently have...  Patient Difficulty: Turning over in bed (including adjusting bedclothes, sheets and blankets)?: A Little   Patient Difficulty: Sitting down on and standing up from a chair with arms (e.g., wheelchair, bedside commode, etc.): A Lot   Patient Difficulty: Moving from lying on back to sitting on the side of the bed?: A Little   How much help from another person does the patient currently need...   Help from Another: Moving to and from a bed to a chair (including a wheelchair)?: A Little   Help from Another: Need to walk in hospital room?: A Little   Help from Another: Climbing 3-5 steps with a railing?: A Lot     AM-PAC Score:  Raw Score: 16   Approx Degree of Impairment: 54.16%   Standardized Score (AM-PAC Scale): 40.78   CMS Modifier (G-Code): CK    FUNCTIONAL ABILITY STATUS  Functional  Mobility/Gait Assessment  Gait Assistance: Minimum assistance  Distance (ft): 25 ft  Assistive Device: Rolling walker  Pattern:  (decreased julia, forward trunk lean, step to gait pattern. Cued to look up for posture management. Cued to keep eyes open for pt safety.)  Sit to Stand: minimal assist and moderate assist: 2 trails. First attempt required mod A with cuing for UE sequencing with RW. Second attempt required min A, blankets placed on chair to increase chair height. Pt required increased time to complete both attempts.     Exercise/Education Provided:  Body mechanics  Gait training  Posture  Transfer training    The patient's Approx Degree of Impairment: 54.16% has been calculated [facility] Basic Mobility Short Form.  Research supports that patients with this level of impairment may benefit from IP.  Final disposition will be made by interdisciplinary medical team.    Pt received sitting in chair. RN approved therapy. Pt agreeable to participation. Educated pt on spine precautions, pt acknowledged understanding. Educated pt on use of LSO at all times except in bed and proper fit. Pt distracted with conversation required multiples cues to redirect. Primary complaints of pain in bilateral LE's and rates at 6-7 out of 10 per pain scale.     Patient End of Session: Up in chair;Needs met;Call light within reach;RN aware of session/findings;All patient questions and concerns addressed;Alarm set    CURRENT GOALS  Goals to be met by: 7/8/24  Patient Goal Patient's self-stated goal is: return to PLOF   Goal #1 Patient is able to demonstrate supine - sit EOB @ level: supervision     Goal #1   Current Status    Goal #2 Patient is able to demonstrate transfers Sit to/from Stand at assistance level: Supervision with walker - rolling     Goal #2  Current Status    Goal #3 Patient is able to ambulate 50 feet with assist device: walker - rolling at assistance level:  Supervision   Goal #3   Current Status    Goal #4 Patient will negotiate 2 stairs/one curb w/ assistive device and CGA   Goal #4   Current Status    Goal #5 Patient to demonstrate independence with home activity/exercise instructions provided to patient in preparation for discharge.   Goal #5   Current Status      Patient Evaluation Complexity Level:  History Moderate - 1 or 2 personal factors and/or co-morbidities   Examination of body systems Moderate - addressing a total of 3 or more elements   Clinical Presentation  Moderate - Evolving   Clinical Decision Making  Moderate Complexity     Gait Training: 10 minutes  Therapeutic Activity:  14 minutes    [facility]  DPT Student     I provided clinical instruction and supervision for the duration of this session and agree with the above documentation.     Ingrid Archer, PT, DPT
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
## 2024-07-01 NOTE — PLAN OF CARE
Patient is Post op day 7. A&Ox4. RA. General diet. Saline locked.Scd on left left. Voiding via purewick. PRN Robaxin, & Oxycodone for pain management. Up by 1 assist with a walker. Call light within reach, frequent rounding. Safety measures in place. Plan for ortho eval in am.       Problem: Patient Centered Care  Goal: Patient preferences are identified and integrated in the patient's plan of care  Description: Interventions:  - What would you like us to know as we care for you? Be able to ambulate  - Provide timely, complete, and accurate information to patient/family  - Incorporate patient and family knowledge, values, beliefs, and cultural backgrounds into the planning and delivery of care  - Encourage patient/family to participate in care and decision-making at the level they choose  - Honor patient and family perspectives and choices  Outcome: Progressing     Problem: PAIN - ADULT  Goal: Verbalizes/displays adequate comfort level or patient's stated pain goal  Description: INTERVENTIONS:  - Encourage pt to monitor pain and request assistance  - Assess pain using appropriate pain scale  - Administer analgesics based on type and severity of pain and evaluate response  - Implement non-pharmacological measures as appropriate and evaluate response  - Consider cultural and social influences on pain and pain management  - Manage/alleviate anxiety  - Utilize distraction and/or relaxation techniques  - Monitor for opioid side effects  - Notify MD/LIP if interventions unsuccessful or patient reports new pain  - Anticipate increased pain with activity and pre-medicate as appropriate  Outcome: Progressing     Problem: SAFETY ADULT - FALL  Goal: Free from fall injury  Description: INTERVENTIONS:  - Assess pt frequently for physical needs  - Identify cognitive and physical deficits and behaviors that affect risk of falls.  - [place] fall precautions as indicated by assessment.  - Educate pt/family on patient safety  including physical limitations  - Instruct pt to call for assistance with activity based on assessment  - Modify environment to reduce risk of injury  - Provide assistive devices as appropriate  - Consider OT/PT consult to assist with strengthening/mobility  - Encourage toileting schedule  Outcome: Progressing

## 2024-07-01 NOTE — PLAN OF CARE
Patient is alert and oriented times 4. Forgetful. Breathing on room air. Vitals being monitored. Encouraged ambulation. Voiding via purewick. Therapy on board. Able to ambulate times 1 assist with walker. Encouraged to use bathroom privileges. Call light within reach. Safety precautions in place.   Problem: PAIN - ADULT  Goal: Verbalizes/displays adequate comfort level or patient's stated pain goal  Description: INTERVENTIONS:  - Encourage pt to monitor pain and request assistance  - Assess pain using appropriate pain scale  - Administer analgesics based on type and severity of pain and evaluate response  - Implement non-pharmacological measures as appropriate and evaluate response  - Consider cultural and social influences on pain and pain management  - Manage/alleviate anxiety  - Utilize distraction and/or relaxation techniques  - Monitor for opioid side effects  - Notify MD/LIP if interventions unsuccessful or patient reports new pain  - Anticipate increased pain with activity and pre-medicate as appropriate  Outcome: Progressing     Problem: SAFETY ADULT - FALL  Goal: Free from fall injury  Description: INTERVENTIONS:  - Assess pt frequently for physical needs  - Identify cognitive and physical deficits and behaviors that affect risk of falls.  - [place] fall precautions as indicated by assessment.  - Educate pt/family on patient safety including physical limitations  - Instruct pt to call for assistance with activity based on assessment  - Modify environment to reduce risk of injury  - Provide assistive devices as appropriate  - Consider OT/PT consult to assist with strengthening/mobility  - Encourage toileting schedule  Outcome: Progressing

## 2024-07-02 NOTE — CM/SW NOTE
07/02/24 1100   CM/SW Referral Data   Referral Source Physician;Social Work (self-referral)   Reason for Referral Discharge planning   Informant Patient   Medical Hx   Does patient have an established PCP? Yes   Patient Info   Patient's Current Mental Status at Time of Assessment Alert;Oriented   Patient's Home Environment House   Number of Levels in Home 2   Number of Stair in Home 8   Patient lives with Alone  (will have brother stay with and family around to assist)   Patient Status Prior to Admission   Independent with ADLs and Mobility Yes   Discharge Needs   Anticipated D/C needs Home health care   Choice of Post-Acute Provider   Informed patient of right to choose their preferred provider Yes   List of appropriate post-acute services provided to patient/family with quality data Yes     SW spoke with pt regarding DC planning. Pt has hx of rehab at [facility]. Pt's hope is to discharge home with [facility]. Pt states she can go home as long as the swelling of her legs go down and she is able to walk    Pt states her brother lives in [place] and would be able to stay with her in addition to grandchildren and family around to assist as needed. Pt states she prefers to go home and feels safe at home    Pt agreeable to [facility] - referrals sent in aidin for HHC - pending accepting agency     PLAN: home with HHC - pending accepting agency     Maritza Mendes, VICTORIANO, MSW ext. [phone]

## 2024-07-02 NOTE — PLAN OF CARE
Post-op day #8. Dressing in place to Lower back. Monitoring vital signs- stable at this time. No acute changes noted throughout shift. Tolerating diet. Voiding up to bathroom. SCDs for DVT prophylaxis. Pain medication provided as needed. Up with standby assist and a walker. Encouraged frequent ambulation and use of incentive spirometer. Fall precautions maintained- bed alarm on, bed locked in lowest position, call light and personal belongings within reach, non-skid socks in place to bilateral feet. Frequent rounding by nursing staff. Plan to discharge to home with HH pending medical clearance.     Problem: Patient Centered Care  Goal: Patient preferences are identified and integrated in the patient's plan of care  Description: Interventions:  - What would you like us to know as we care for you? Be able to ambulate  - Provide timely, complete, and accurate information to patient/family  - Incorporate patient and family knowledge, values, beliefs, and cultural backgrounds into the planning and delivery of care  - Encourage patient/family to participate in care and decision-making at the level they choose  - Honor patient and family perspectives and choices  Outcome: Progressing     Problem: PAIN - ADULT  Goal: Verbalizes/displays adequate comfort level or patient's stated pain goal  Description: INTERVENTIONS:  - Encourage pt to monitor pain and request assistance  - Assess pain using appropriate pain scale  - Administer analgesics based on type and severity of pain and evaluate response  - Implement non-pharmacological measures as appropriate and evaluate response  - Consider cultural and social influences on pain and pain management  - Manage/alleviate anxiety  - Utilize distraction and/or relaxation techniques  - Monitor for opioid side effects  - Notify MD/LIP if interventions unsuccessful or patient reports new pain  - Anticipate increased pain with activity and pre-medicate as appropriate  Outcome:  Progressing     Problem: SAFETY ADULT - FALL  Goal: Free from fall injury  Description: INTERVENTIONS:  - Assess pt frequently for physical needs  - Identify cognitive and physical deficits and behaviors that affect risk of falls.  - [place] fall precautions as indicated by assessment.  - Educate pt/family on patient safety including physical limitations  - Instruct pt to call for assistance with activity based on assessment  - Modify environment to reduce risk of injury  - Provide assistive devices as appropriate  - Consider OT/PT consult to assist with strengthening/mobility  - Encourage toileting schedule  Outcome: Progressing

## 2024-07-02 NOTE — PLAN OF CARE
Patient is Post op day 7. A&Ox4. RA. General diet. Saline locked.Scd on left left. Voiding freely. PRN Robaxin, & Oxycodone for pain management. Up by 1 assist with a walker. Call light within reach, frequent rounding. Safety measures in place. Plan for [facility] when medically clear.     Problem: Patient Centered Care  Goal: Patient preferences are identified and integrated in the patient's plan of care  Description: Interventions:  - What would you like us to know as we care for you? Be able to ambulate  - Provide timely, complete, and accurate information to patient/family  - Incorporate patient and family knowledge, values, beliefs, and cultural backgrounds into the planning and delivery of care  - Encourage patient/family to participate in care and decision-making at the level they choose  - Honor patient and family perspectives and choices  7/1/2024 2321 by Belle Raymond RN  Outcome: Progressing  7/1/2024 2318 by Belle Raymond RN  Outcome: Progressing     Problem: PAIN - ADULT  Goal: Verbalizes/displays adequate comfort level or patient's stated pain goal  Description: INTERVENTIONS:  - Encourage pt to monitor pain and request assistance  - Assess pain using appropriate pain scale  - Administer analgesics based on type and severity of pain and evaluate response  - Implement non-pharmacological measures as appropriate and evaluate response  - Consider cultural and social influences on pain and pain management  - Manage/alleviate anxiety  - Utilize distraction and/or relaxation techniques  - Monitor for opioid side effects  - Notify MD/LIP if interventions unsuccessful or patient reports new pain  - Anticipate increased pain with activity and pre-medicate as appropriate  7/1/2024 2321 by Belle Raymond RN  Outcome: Progressing  7/1/2024 2318 by Belle Raymond RN  Outcome: Progressing     Problem: SAFETY ADULT - FALL  Goal: Free from fall injury  Description: INTERVENTIONS:  - Assess pt  frequently for physical needs  - Identify cognitive and physical deficits and behaviors that affect risk of falls.  - [place] fall precautions as indicated by assessment.  - Educate pt/family on patient safety including physical limitations  - Instruct pt to call for assistance with activity based on assessment  - Modify environment to reduce risk of injury  - Provide assistive devices as appropriate  - Consider OT/PT consult to assist with strengthening/mobility  - Encourage toileting schedule  7/1/2024 2321 by Belle Raymond, RN  Outcome: Progressing  7/1/2024 2318 by Belle Raymond, RN  Outcome: Progressing

## 2024-07-02 NOTE — PROGRESS NOTES
[facility]  part of Merged with [facility]    Progress Note    Tess Melendez Patient Status:  Inpatient    7/15/1945 MRN [id number]   Location [facility] 4W/SW/SE Attending Clarita Cardenas MD   Hosp Day # 2 PCP MARCIA NAVARRO       Subjective:   Tess Melendez is a(n) 78 year old female back pain and leg weakness    Objective:     Vitals:    24 1541   BP:    Pulse: 76   Resp:    Temp:        Intake/Output Summary (Last 24 hours) at 2024 2248  Last data filed at 2024 0543  Gross per 24 hour   Intake --   Output 700 ml   Net -700 ml     Wt Readings from Last 2 Encounters:   24 135 lb 9.6 oz (61.5 kg)   24 130 lb 9.6 oz (59.2 kg)       General appearance:  alert, appears stated age, and cooperative  Head: Normocephalic, without obvious abnormality, atraumatic  Eyes: conjunctivae/corneas clear. PERRL, EOM's intact. Fundi benign.  Neck: no adenopathy, no carotid bruit, no JVD, supple, symmetrical, trachea midline, and thyroid not enlarged, symmetric, no tenderness/mass/nodules  Pulmonary: clear to auscultation bilaterally  Cardiovascular: S1, S2 normal, no murmur, click, rub or gallop, regular rate and rhythm  Abdominal: soft, non-tender; bowel sounds normal; no masses,  no organomegaly  Extremities: extremities normal, atraumatic, no cyanosis or edema  Pulses: 2+ and symmetric  Neurologic: Grossly normal  Genital Exam: defer exam    Current Medications:    Current Facility-Administered Medications:     gabapentin (Neurontin) cap 300 mg, 300 mg, Oral, TID    oxyCODONE immediate release tab 5 mg, 5 mg, Oral, Q6H PRN    methocarbamol (Robaxin) tab 750 mg, 750 mg, Oral, Q6H PRN    acetaminophen (Tylenol) tab 325 mg, 325 mg, Oral, Q6H PRN    Allergies  No Known Allergies    Assessment and Plan:     Leg swelling  Patient has leg swelling part of this appears to be edema status postop.  Ultrasound is indicative of old superficial thrombosis and it is in the superficial system  I do not suspect deep venous thrombosis.  For the time being we will monitor carefully avoid IV fluids will give dose of diuretic if needed.       Anemia  Anemia is likely postoperative, hematology is on consult I do not have any other source of blood loss.       Weakness  Weakness is likely multifactorial part of it is postop but I suspect neuro logical involvement postsurgery as well, preoperatively patient had been able to ambulate with some use of a walker.  Based on postoperative instructions from [facility] patient is supposed to wear a back corset and should be ambulating I have placed a PT consult back corset is at bedside nurse was instructed to call physical therapy.       Deep vein thrombosis (DVT) of proximal lower extremity, unspecified chronicity, unspecified laterality (HCC)  Patient does not have a DVT.        Acute pain: Patient will be on oxycodone acetaminophen Neurontin and methocarbamol.     DVT prophylaxis: Patient currently cannot receive anticoagulation or subcu heparin.  In light of no DVT and likely old superficial thrombosis, will place SCD boots.  Discussed with hematologist.     GI prophylaxis: Patient will be on PPI.     Patient currently does not want to follow-up with the primary surgeon at [facility] hence a second opinion has been requested from Dr. Grijalva and will await his recommendations.    July 1, 2024  Patient is more alert and interactive.  Able to transfer to the cardiac chair from bed.  Still reports leg numbness and heaviness.  Neuroexam has been unchanged since admission.  Appreciate recommendation from pain service.  Patient will undergo physical therapy.  Will discharge when stable.  Results:     Lab Results   Component Value Date    WBC 6.7 07/01/2024    HGB 9.8 (L) 07/01/2024    HCT 29.6 (L) 07/01/2024    .0 07/01/2024     07/01/2024    K 4.2 07/01/2024     07/01/2024    CO2 24.0 07/01/2024    CREATSERUM 0.73 07/01/2024     BUN 8 (L) 07/01/2024    GLU 92 07/01/2024    CA 8.5 (L) 07/01/2024    ALB 3.6 07/01/2024    ALKPHO 95 07/01/2024    BILT 0.4 07/01/2024    TP 5.8 07/01/2024    AST 48 (H) 07/01/2024    ALT 37 07/01/2024    TSH 0.900 09/28/2021     08/07/2020    DDIMER 0.35 11/02/2021    ESRML 10 07/11/2022    CRP <0.29 07/11/2022    MG 2.0 07/01/2024    PHOS 2.6 07/01/2024    TROP <0.045 08/07/2020    B12 590 06/30/2024     No results found for this visit on 06/29/24.    XR CHEST AP/PA (1 VIEW) (CPT=71045)    Result Date: 6/30/2024  CONCLUSION:  1. Small left pleural effusion with probable associated compressive atelectasis at the left lung base. 2. Stable chronic elevation of the left hemidiaphragm.   Dictated by (CST): Cipriano Cruz MD on 6/30/2024 at 1:44 PM     Finalized by (CST): Cipriano Cruz MD on 6/30/2024 at 1:52 PM         EKG 12 Lead    Result Date: 7/1/2024  Normal sinus rhythm Normal ECG When compared with ECG of 29-JUN-2024 12:42, Vent. rate has decreased BY  33 BPM Confirmed by JOSÉ LUIS CRANDALL ([id number]) on 7/1/2024 6:40:56 AM             FRANCIS PEREIRA MD  7/1/2024

## 2024-07-02 NOTE — PHYSICAL THERAPY NOTE
PHYSICAL THERAPY TREATMENT NOTE - INPATIENT     Room Number: 410/410-A       Presenting Problem: leg swelling and weakness. s/p lumbar fusion L4-5 24 at outside hospital  Co-Morbidities : R LE DVT, s/p R JEREMIAS    Problem List  Principal Problem:    Leg swelling  Active Problems:    Anemia    Weakness    Deep vein thrombosis (DVT) of proximal lower extremity, unspecified chronicity, unspecified laterality (HCC)      PHYSICAL THERAPY ASSESSMENT   Patient demonstrates good  progress this session, goals  remain in progress.    Patient continues to function below baseline with bed mobility, transfers, and gait.  Contributing factors to remaining limitations include decreased functional strength, decreased endurance/aerobic capacity, and pain.  Next session anticipate patient to progress bed mobility, transfers, and gait.  Physical Therapy will continue to follow patient for duration of hospitalization.    Patient continues to benefit from continued skilled PT services: to promote return to prior level of function and safety with continuous assistance and gradual rehabilitative therapy .    PLAN  PT Treatment Plan: Bed mobility;Coordination;Endurance  Frequency (Obs): Daily    SUBJECTIVE  Pt reports being ready for PT RX    OBJECTIVE  Precautions: Lumbar brace;Bed/chair alarm    WEIGHT BEARING RESTRICTION        R Lower Extremity: Full Weight Bearing  L Lower Extremity: Full Weight Bearing    PAIN ASSESSMENT   Ratin  Location: BLE  Management Techniques: Relaxation;Repositioning;Body mechanics    BALANCE  Static Sitting: Fair +  Dynamic Sitting: Fair  Static Standing: Fair +  Dynamic Standing: Fair -    ACTIVITY TOLERANCE                          O2 WALK       AM-PAC '6-Clicks' INPATIENT SHORT FORM - BASIC MOBILITY  How much difficulty does the patient currently have...  Patient Difficulty: Turning over in bed (including adjusting bedclothes, sheets and blankets)?: A Little   Patient Difficulty: Sitting down on and  standing up from a chair with arms (e.g., wheelchair, bedside commode, etc.): A Lot   Patient Difficulty: Moving from lying on back to sitting on the side of the bed?: A Little   How much help from another person does the patient currently need...   Help from Another: Moving to and from a bed to a chair (including a wheelchair)?: A Little   Help from Another: Need to walk in hospital room?: A Little   Help from Another: Climbing 3-5 steps with a railing?: A Lot     AM-PAC Score:  Raw Score: 16   Approx Degree of Impairment: 54.16%   Standardized Score (AM-PAC Scale): 40.78   CMS Modifier (G-Code): CK    FUNCTIONAL ABILITY STATUS  Functional Mobility/Gait Assessment  Gait Assistance: Minimum assistance  Distance (ft): 2 x 50  Assistive Device: Rolling walker  Pattern: Shuffle  Rolling: minimal assist  Supine to Sit: minimal assist  Sit to Supine: minimal assist  Sit to Stand: minimal assist    Additional information: Pt seen daily.Min a for bed mobility and transfer;Extra time provided to complete task.EOB sitting balance activity with emphasis on core stabilization.Pt amb 2 x 50 ft with RW and min a;provided cuing for gait pattern as well as for postural awareness.There  ex.Family present;family education;all questions and concerns addressed.    The patient's Approx Degree of Impairment: 54.16% has been calculated based on documentation in the [facility] '6 clicks' Inpatient Daily Activity Short Form.  Research supports that patients with this level of impairment may benefit from Sub-acute Rehabilitation.  Final disposition will be made by interdisciplinary medical team.    THERAPEUTIC EXERCISES  Lower Extremity Ankle pumps  Glut sets  Quad sets     Position Supine       Patient End of Session: Up in chair;Call light within reach;RN aware of session/findings;All patient questions and concerns addressed    CURRENT GOALS   Patient Goal Patient's self-stated goal is: return to PLOF   Goal #1 Patient is able to demonstrate  supine - sit EOB @ level: supervision     Goal #1   Current Status Min a   Goal #2 Patient is able to demonstrate transfers Sit to/from Stand at assistance level: Supervision with walker - rolling     Goal #2  Current Status Min a    Goal #3 Patient is able to ambulate 50 feet with assist device: walker - rolling at assistance level: Supervision   Goal #3   Current Status Pt amb 2 x 50 ft with RW and Min a   Goal #4 Patient will negotiate 2 stairs/one curb w/ assistive device and CGA   Goal #4   Current Status NT   Goal #5 Patient to demonstrate independence with home activity/exercise instructions provided to patient in preparation for discharge.   Goal #5   Current Status In progress     Gait Training: 15 minutes  Therapeutic Activity: 15 minutes  Neuromuscular Re-education:  minutes  Therapeutic Exercise:  minutes  Canalith Repositioning:  minutes  Manual Therapy:  minutes  Can add/delete any of these

## 2024-07-02 NOTE — CM/SW NOTE
Department  notified of request for HHC, aidin referrals started. Assigned CM/SW to follow up with pt/family on further discharge planning.     Heather Borden, DSC

## 2024-07-03 RX ORDER — FUROSEMIDE 20 MG/1
20 TABLET ORAL ONCE
Status: COMPLETED | OUTPATIENT
Start: 2024-07-04 | End: 2024-07-04

## 2024-07-03 NOTE — DISCHARGE INSTRUCTIONS
Sometimes managing your health at home requires assistance.  The [facility] team has recognized your preference to use [facility].  They can be reached at [phone].  The fax number for your reference is [phone]..  A representative from the home health agency will contact you or your family to schedule your first visit.

## 2024-07-03 NOTE — PHYSICAL THERAPY NOTE
PHYSICAL THERAPY TREATMENT NOTE - INPATIENT     Room Number: 410/410-A       Presenting Problem: leg swelling and weakness. s/p lumbar fusion L4-5 24 at outside hospital  Co-Morbidities : R LE DVT, s/p R JEREMIAS    Problem List  Principal Problem:    Leg swelling  Active Problems:    Anemia    Weakness    Deep vein thrombosis (DVT) of proximal lower extremity, unspecified chronicity, unspecified laterality (HCC)      PHYSICAL THERAPY ASSESSMENT   Patient demonstrates good  progress this session, goals  remain in progress.    Patient continues to function below baseline with bed mobility, transfers, and gait.  Contributing factors to remaining limitations include decreased functional strength, decreased endurance/aerobic capacity, and pain.  Next session anticipate patient to progress bed mobility, transfers, and gait.  Physical Therapy will continue to follow patient for duration of hospitalization.    Patient continues to benefit from continued skilled PT services: to promote return to prior level of function and safety with continuous assistance and gradual rehabilitative therapy .    PLAN  PT Treatment Plan: Body mechanics;Don/doff brace;Energy conservation;Gait training  Frequency (Obs): Daily    SUBJECTIVE  Pt reports being ready for PT RX    OBJECTIVE  Precautions: Lumbar brace    WEIGHT BEARING RESTRICTION        R Lower Extremity: Full Weight Bearing  L Lower Extremity: Full Weight Bearing    PAIN ASSESSMENT   Ratin  Location: BLE  Management Techniques: Relaxation;Repositioning;Body mechanics    BALANCE  Static Sitting: Fair +  Dynamic Sitting: Fair  Static Standing: Fair +  Dynamic Standing: Fair -    ACTIVITY TOLERANCE                          O2 WALK       AM-PAC '6-Clicks' INPATIENT SHORT FORM - BASIC MOBILITY  How much difficulty does the patient currently have...  Patient Difficulty: Turning over in bed (including adjusting bedclothes, sheets and blankets)?: A Little   Patient Difficulty: Sitting  down on and standing up from a chair with arms (e.g., wheelchair, bedside commode, etc.): A Lot   Patient Difficulty: Moving from lying on back to sitting on the side of the bed?: A Little   How much help from another person does the patient currently need...   Help from Another: Moving to and from a bed to a chair (including a wheelchair)?: A Little   Help from Another: Need to walk in hospital room?: A Little   Help from Another: Climbing 3-5 steps with a railing?: A Lot     AM-PAC Score:  Raw Score: 16   Approx Degree of Impairment: 54.16%   Standardized Score (AM-PAC Scale): 40.78   CMS Modifier (G-Code): CK    FUNCTIONAL ABILITY STATUS  Functional Mobility/Gait Assessment  Gait Assistance: Minimum assistance  Distance (ft): 2 x 100  Assistive Device: Rolling walker  Pattern: Shuffle  Stairs: Stairs  How Many Stairs: 4  Device: 1 Rail  Assist: Contact guard assist  Pattern: Ascend and Descend  Rolling: minimal assist  Supine to Sit: minimal assist  Sit to Supine: minimal assist  Sit to Stand: minimal assist    Additional information: Pt seen daily.Min a for bed mobility and transfer;Extra time provided to complete task.EOB sitting balance activity with emphasis on core stabilization.Spinal precautions reviewed;education .Pt amb 2 x 100 ft with RW and min a;provided cuing for gait pattern as well as for postural awareness.There  ex.Family present;family education;all questions and concerns addressed. Navigated 4 stairs with Min a.        The patient's Approx Degree of Impairment: 54.16% has been calculated based on documentation in the [facility] '6 clicks' Inpatient Daily Activity Short Form.  Research supports that patients with this level of impairment may benefit from Sub-acute Rehabilitation.  Final disposition will be made by interdisciplinary medical team.    THERAPEUTIC EXERCISES  Lower Extremity Ankle pumps  Glut sets  Quad sets     Position Supine       Patient End of Session: Up in chair;Call light within  reach;RN aware of session/findings;All patient questions and concerns addressed    CURRENT GOALS   Patient Goal Patient's self-stated goal is: return to PLOF   Goal #1 Patient is able to demonstrate supine - sit EOB @ level: supervision     Goal #1   Current Status Min a   Goal #2 Patient is able to demonstrate transfers Sit to/from Stand at assistance level: Supervision with walker - rolling     Goal #2  Current Status Min a    Goal #3 Patient is able to ambulate 50 feet with assist device: walker - rolling at assistance level: Supervision   Goal #3   Current Status Pt amb 2 x 100 ft with RW and Min a   Goal #4 Patient will negotiate 2 stairs/one curb w/ assistive device and CGA   Goal #4   Current Status Navigated 4 stairs with CGA   Goal #5 Patient to demonstrate independence with home activity/exercise instructions provided to patient in preparation for discharge.   Goal #5   Current Status In progress     Gait Training: 15 minutes  Therapeutic Activity: 15 minutes  Neuromuscular Re-education:  minutes  Therapeutic Exercise:15  minutes  Canalith Repositioning:  minutes  Manual Therapy:  minutes  Can add/delete any of these

## 2024-07-03 NOTE — PLAN OF CARE
Patient is Post op day 9. A&Ox4. RA. General diet. Saline locked.Scd on left left. Voiding freely. PRN Robaxin, & Oxycodone, & tylenol for pain management. Up by 1 assist with a walker. Call light within reach, frequent rounding. Safety measures in place. Plan for HH when medically clear.       Problem: Patient Centered Care  Goal: Patient preferences are identified and integrated in the patient's plan of care  Description: Interventions:  - What would you like us to know as we care for you? Be able to ambulate  - Provide timely, complete, and accurate information to patient/family  - Incorporate patient and family knowledge, values, beliefs, and cultural backgrounds into the planning and delivery of care  - Encourage patient/family to participate in care and decision-making at the level they choose  - Honor patient and family perspectives and choices  Outcome: Progressing     Problem: PAIN - ADULT  Goal: Verbalizes/displays adequate comfort level or patient's stated pain goal  Description: INTERVENTIONS:  - Encourage pt to monitor pain and request assistance  - Assess pain using appropriate pain scale  - Administer analgesics based on type and severity of pain and evaluate response  - Implement non-pharmacological measures as appropriate and evaluate response  - Consider cultural and social influences on pain and pain management  - Manage/alleviate anxiety  - Utilize distraction and/or relaxation techniques  - Monitor for opioid side effects  - Notify MD/LIP if interventions unsuccessful or patient reports new pain  - Anticipate increased pain with activity and pre-medicate as appropriate  Outcome: Progressing     Problem: SAFETY ADULT - FALL  Goal: Free from fall injury  Description: INTERVENTIONS:  - Assess pt frequently for physical needs  - Identify cognitive and physical deficits and behaviors that affect risk of falls.  - Madill fall precautions as indicated by assessment.  - Educate pt/family on patient  safety including physical limitations  - Instruct pt to call for assistance with activity based on assessment  - Modify environment to reduce risk of injury  - Provide assistive devices as appropriate  - Consider OT/PT consult to assist with strengthening/mobility  - Encourage toileting schedule  Outcome: Progressing

## 2024-07-03 NOTE — CONGREGATE LIVING REVIEW
Congregate Living Authorization    The [facility] Living Review Committee (CLRC) has reviewed this case and the committee DOES NOT RECOMMEND discharge to a skilled nursing facility under skilled care.    The CLRC recommends:  Home with home health and any other appropriate caregiver assistance or medical equipment as needed vs respite in SNF.     Does not appear to have skilled services for KYLER, but additional home support appears to be needed.    For questions regarding CLRC approval process, please contact the CM assigned to the case.  For questions regarding RN discharge workflow, please contact the unit Clinical Leader.

## 2024-07-03 NOTE — CM/SW NOTE
Anticipated therapy need: Gradual Rehabilitative Therapy    Per chart review, patient preference is to return home with home health.  HHC referrals pending in [place] - CM will meet with pt/family today to provide list/confirm choice HHA if this remains the patient preferred dc plan.    CM sent tentative [facility] referrals in [place]. [facility] will review patient appropriateness for [facility].  PASRR completed and uploaded to referral.  [facility], CM will confirm with pt/family that choice is to decline [facility].  However, if pt/family agreeable to [facility] - CM will provide list/confirm choice facility, and begin insurance authorization process.    1015 CM met with patient at bedside to discuss dc planning.  Patient continues to decline [facility] - states she has family support at home.  [facility]    CM provided list of HHA for patient to review - CM will follow up this afternoon to confirm choice.    1429 CM received message from CHERYL Alvarez stating she received a call from [facility] reporting patient is current with their agency.    CM met with patient at bedside to discuss above.  Patient states this HHA may have been arranged by  at [facility], but care has not been initiated and she chooses not to receive care with this agency.    Patient states upon reviewing list of HHA, her choice is [facility].  CM reserved [facility]  via [place] and notified liaison of patient choice.  CM will notify [facility]  at time of dc - agency will be reaching out to patient to discuss start of care. Patient discharge instructions updated with [facility] contact information.     / to remain available for support and/or discharge planning.     Plan: Home with [facility] once medically cleared    Rosalia Rodriguez RN, BSN  Nurse   [phone]

## 2024-07-04 LAB
ALBUMIN SERPL-MCNC: 3.4 G/DL (ref 3.2–4.8)
ALBUMIN/GLOB SERPL: 1.5 {RATIO} (ref 1–2)
ALP LIVER SERPL-CCNC: 99 U/L
ALT SERPL-CCNC: 25 U/L
ANION GAP SERPL CALC-SCNC: 6 MMOL/L (ref 0–18)
AST SERPL-CCNC: 27 U/L (ref ?–34)
BILIRUB SERPL-MCNC: 0.3 MG/DL (ref 0.2–1.1)
BUN BLD-MCNC: 10 MG/DL (ref 9–23)
BUN/CREAT SERPL: 13 (ref 10–20)
CALCIUM BLD-MCNC: 8.4 MG/DL (ref 8.7–10.4)
CHLORIDE SERPL-SCNC: 108 MMOL/L (ref 98–112)
CO2 SERPL-SCNC: 27 MMOL/L (ref 21–32)
CREAT BLD-MCNC: 0.77 MG/DL
DEPRECATED RDW RBC AUTO: 42.5 FL (ref 35.1–46.3)
EGFRCR SERPLBLD CKD-EPI 2021: 79 ML/MIN/1.73M2 (ref 60–?)
ERYTHROCYTE [DISTWIDTH] IN BLOOD BY AUTOMATED COUNT: 13.2 % (ref 11–15)
GLOBULIN PLAS-MCNC: 2.2 G/DL (ref 2–3.5)
GLUCOSE BLD-MCNC: 93 MG/DL (ref 70–99)
HCT VFR BLD AUTO: 26.4 %
HGB BLD-MCNC: 9.1 G/DL
MAGNESIUM SERPL-MCNC: 1.8 MG/DL (ref 1.6–2.6)
MCH RBC QN AUTO: 30.8 PG (ref 26–34)
MCHC RBC AUTO-ENTMCNC: 34.5 G/DL (ref 31–37)
MCV RBC AUTO: 89.5 FL
OSMOLALITY SERPL CALC.SUM OF ELEC: 291 MOSM/KG (ref 275–295)
PHOSPHATE SERPL-MCNC: 3.3 MG/DL (ref 2.4–5.1)
PLATELET # BLD AUTO: 312 10(3)UL (ref 150–450)
POTASSIUM SERPL-SCNC: 4.4 MMOL/L (ref 3.5–5.1)
PROT SERPL-MCNC: 5.6 G/DL (ref 5.7–8.2)
RBC # BLD AUTO: 2.95 X10(6)UL
SODIUM SERPL-SCNC: 141 MMOL/L (ref 136–145)
WBC # BLD AUTO: 6.9 X10(3) UL (ref 4–11)

## 2024-07-04 RX ORDER — FUROSEMIDE 20 MG/1
20 TABLET ORAL EVERY MORNING
Status: DISCONTINUED | OUTPATIENT
Start: 2024-07-05 | End: 2024-07-06

## 2024-07-04 RX ORDER — MAGNESIUM OXIDE 400 MG/1
400 TABLET ORAL ONCE
Status: COMPLETED | OUTPATIENT
Start: 2024-07-04 | End: 2024-07-04

## 2024-07-04 NOTE — PHYSICAL THERAPY NOTE
PHYSICAL THERAPY TREATMENT NOTE - INPATIENT     Room Number: 410/410-A       Presenting Problem: leg swelling and weakness. s/p lumbar fusion L4-5 6/24/24 at outside hospital  Co-Morbidities : R LE DVT, s/p R JEREMIAS    Problem List  Principal Problem:    Leg swelling  Active Problems:    Anemia    Weakness    Deep vein thrombosis (DVT) of proximal lower extremity, unspecified chronicity, unspecified laterality (HCC)      PHYSICAL THERAPY ASSESSMENT   Patient demonstrates good  progress this session, goals  remain in progress.    Patient continues to function below baseline with bed mobility, transfers, gait, stair negotiation, and standing prolonged periods.  Contributing factors to remaining limitations include decreased functional strength, decreased endurance/aerobic capacity, and decreased muscular endurance.  Next session anticipate patient to progress bed mobility, transfers, gait, stair negotiation, and standing prolonged periods.  Physical Therapy will continue to follow patient for duration of hospitalization.    Patient continues to benefit from continued skilled PT services: at discharge to promote functional independence and safety with additional support and return home with home health PT.    PLAN  PT Treatment Plan: Bed mobility;Body mechanics;Coordination;Don/doff brace;Endurance;Energy conservation;Patient education;Gait training;Strengthening;Stoop training;Stair training;Transfer training;Balance training  Frequency (Obs): Daily    SUBJECTIVE  Pt was agreeable to therapy session.         OBJECTIVE  Precautions: Lumbar brace    WEIGHT BEARING RESTRICTION        R Lower Extremity: Full Weight Bearing  L Lower Extremity: Full Weight Bearing    PAIN ASSESSMENT   Rating:  (did not rate)  Location: BLE  Management Techniques: Activity promotion;Body mechanics;Relaxation;Repositioning    BALANCE  Static Sitting: Good  Dynamic Sitting: Fair +  Static Standing: Fair  Dynamic Standing: Fair -    ACTIVITY  TOLERANCE                          O2 WALK       AM-PAC '6-Clicks' INPATIENT SHORT FORM - BASIC MOBILITY  How much difficulty does the patient currently have...  Patient Difficulty: Turning over in bed (including adjusting bedclothes, sheets and blankets)?: A Little   Patient Difficulty: Sitting down on and standing up from a chair with arms (e.g., wheelchair, bedside commode, etc.): A Little   Patient Difficulty: Moving from lying on back to sitting on the side of the bed?: A Little   How much help from another person does the patient currently need...   Help from Another: Moving to and from a bed to a chair (including a wheelchair)?: A Little   Help from Another: Need to walk in hospital room?: A Little   Help from Another: Climbing 3-5 steps with a railing?: A Little     AM-PAC Score:  Raw Score: 18   Approx Degree of Impairment: 46.58%   Standardized Score (AM-PAC Scale): 43.63   CMS Modifier (G-Code): CK    FUNCTIONAL ABILITY STATUS  Functional Mobility/Gait Assessment  Gait Assistance: Supervision  Distance (ft): 150'  Assistive Device: Rolling walker  Pattern:  (narrow JOHANNE)  Stairs:  (-)  How Many Stairs:  (-)  Device:  (-)  Assist:  (-)  Pattern:  (-)  Rolling: stand-by assist  Supine to Sit: stand-by assist  Sit to Supine:  NT  Sit to Stand: supervision    Additional information: Pt is received in the bed and was cleared for therapy session. Pt educated and reviewed and able to maintain all her spinal precautions throughout the session. Pt is SBA with bed mobility and to transfer to the EOB. Pt was able to perform with log rolling technique. Pt sat EOB for a few minutes and denied any dizziness and light headedness. Donned pt's LSO brace also while sitting EOB. Pt is SBA with sit<>stand transfers with the RW. Pt was able to AMBabout 150' with the RW SBA for balance and safety. Pt with slow gait and decreased step length with narrow JOHANNE but with good balance and safety awareness. Pt performed stair training  last therapy session. Returned pt back to the room and to sitting in the chair with all needs within reach. Pt is on track to dc to home once medically cleared. Reported to the RN on the status of the pt.     The patient's Approx Degree of Impairment: 46.58% has been calculated based on documentation in the [facility] '6 clicks' Inpatient Daily Activity Short Form.  Research supports that patients with this level of impairment may benefit from home with HHC and assist.  Final disposition will be made by interdisciplinary medical team.        Patient End of Session: Up in chair;Needs met;Call light within reach;RN aware of session/findings;All patient questions and concerns addressed    CURRENT GOALS     Goals to be met by: 7/8/24  Patient Goal Patient's self-stated goal is: return to PLOF   Goal #1 Patient is able to demonstrate supine - sit EOB @ level: supervision     Goal #1   Current Status SBA    Goal #2 Patient is able to demonstrate transfers Sit to/from Stand at assistance level: Supervision with walker - rolling     Goal #2  Current Status SBA with the RW   Goal #3 Patient is able to ambulate 50 feet with assist device: walker - rolling at assistance level: Supervision   Goal #3   Current Status 150' with the RW SBA   Goal #4 Patient will negotiate 2 stairs/one curb w/ assistive device and CGA   Goal #4   Current Status NT  pt performed stairs last therapy session.    Goal #5 Patient to demonstrate independence with home activity/exercise instructions provided to patient in preparation for discharge.   Goal #5   Current Status IN PROGRESS     Therapeutic Activity: 30 minutes

## 2024-07-04 NOTE — PLAN OF CARE
Patient is alert and oriented times 4. Breathing on room air. Vitals being monitored. Patient did not agree to put on SCDs because feels pain in leg. Discharge plan is to go home with home health. Patient wants to be discharge tomorrow. Call light within reach. Safety precautions in place.      Problem: Patient Centered Care  Goal: Patient preferences are identified and integrated in the patient's plan of care  Description: Interventions:  - What would you like us to know as we care for you? Be able to ambulate  - Provide timely, complete, and accurate information to patient/family  - Incorporate patient and family knowledge, values, beliefs, and cultural backgrounds into the planning and delivery of care  - Encourage patient/family to participate in care and decision-making at the level they choose  - Honor patient and family perspectives and choices  7/4/2024 1354 by Blanca Carbone RN  Outcome: Progressing  7/4/2024 1354 by Blanca Carbone RN  Outcome: Progressing     Problem: PAIN - ADULT  Goal: Verbalizes/displays adequate comfort level or patient's stated pain goal  Description: INTERVENTIONS:  - Encourage pt to monitor pain and request assistance  - Assess pain using appropriate pain scale  - Administer analgesics based on type and severity of pain and evaluate response  - Implement non-pharmacological measures as appropriate and evaluate response  - Consider cultural and social influences on pain and pain management  - Manage/alleviate anxiety  - Utilize distraction and/or relaxation techniques  - Monitor for opioid side effects  - Notify MD/LIP if interventions unsuccessful or patient reports new pain  - Anticipate increased pain with activity and pre-medicate as appropriate  7/4/2024 1354 by Blanca Carbone RN  Outcome: Progressing  7/4/2024 1354 by Blanca Carbone RN  Outcome: Progressing     Problem: SAFETY ADULT - FALL  Goal: Free from fall injury  Description: INTERVENTIONS:  - Assess pt frequently  for physical needs  - Identify cognitive and physical deficits and behaviors that affect risk of falls.  - [place] fall precautions as indicated by assessment.  - Educate pt/family on patient safety including physical limitations  - Instruct pt to call for assistance with activity based on assessment  - Modify environment to reduce risk of injury  - Provide assistive devices as appropriate  - Consider OT/PT consult to assist with strengthening/mobility  - Encourage toileting schedule  7/4/2024 1354 by Blanca Carbone RN  Outcome: Progressing  7/4/2024 1354 by Blanca Carbone RN  Outcome: Progressing     Problem: RISK FOR INFECTION - ADULT  Goal: Absence of fever/infection during anticipated neutropenic period  Description: INTERVENTIONS  - Monitor WBC  - Administer growth factors as ordered  - Implement neutropenic guidelines  7/4/2024 1354 by Blanca Carbone RN  Outcome: Progressing  7/4/2024 1354 by Blanca Carbone RN  Outcome: Progressing     Problem: DISCHARGE PLANNING  Goal: Discharge to home or other facility with appropriate resources  Description: INTERVENTIONS:  - Identify barriers to discharge w/pt and caregiver  - Include patient/family/discharge partner in discharge planning  - Arrange for needed discharge resources and transportation as appropriate  - Identify discharge learning needs (meds, wound care, etc)  - Arrange for interpreters to assist at discharge as needed  - Consider post-discharge preferences of patient/family/discharge partner  - Complete POLST form as appropriate  - Assess patient's ability to be responsible for managing their own health  - Refer to Case Management Department for coordinating discharge planning if the patient needs post-hospital services based on physician/LIP order or complex needs related to functional status, cognitive ability or social support system  7/4/2024 1354 by Blanca Carbone RN  Outcome: Progressing  7/4/2024 1354 by Blanca Carbone RN  Outcome:  Progressing     Problem: Altered Communication/Language Barrier  Goal: Patient/Family is able to understand and participate in their care  Description: Interventions:  - Assess communication ability and preferred communication style  - Implement communication aides and strategies  - Use visual cues when possible  - Listen attentively, be patient, do not interrupt  - Minimize distractions  - Allow time for understanding and response  - Establish method for patient to ask for assistance (call light)  - Provide an  as needed  - Communicate barriers and strategies to overcome with those who interact with patient  7/4/2024 1354 by Blanca Carbone, RN  Outcome: Progressing  7/4/2024 1354 by Blanca Carbone, RN  Outcome: Progressing

## 2024-07-04 NOTE — PROGRESS NOTES
[facility]  part of [facility]    Progress Note    Tess Melendez Patient Status:  Inpatient    7/15/1945 MRN [id number]   Location [facility] 4W/SW/SE Attending Clarita Cardenas MD   Hosp Day # 4 PCP MARCIA NAVARRO       Subjective:   Tess Melendez is a(n) 78 year old female  Back pain and leg swelling    Objective:     Vitals:    24   BP: 119/58   Pulse: 82   Resp: 15   Temp: 99.1 °F (37.3 °C)       Intake/Output Summary (Last 24 hours) at 7/3/2024 2245  Last data filed at 7/3/2024 0520  Gross per 24 hour   Intake 200 ml   Output 500 ml   Net -300 ml     Wt Readings from Last 2 Encounters:   24 135 lb 9.6 oz (61.5 kg)   24 130 lb 9.6 oz (59.2 kg)       General appearance:  alert, appears stated age, and cooperative  Head: Normocephalic, without obvious abnormality, atraumatic  Eyes: conjunctivae/corneas clear. PERRL, EOM's intact. Fundi benign.  Neck: no adenopathy, no carotid bruit, no JVD, supple, symmetrical, trachea midline, and thyroid not enlarged, symmetric, no tenderness/mass/nodules  Pulmonary: clear to auscultation bilaterally  Cardiovascular: S1, S2 normal, no murmur, click, rub or gallop, regular rate and rhythm  Abdominal: soft, non-tender; bowel sounds normal; no masses,  no organomegaly  Extremities:  +  Pulses: 2+ and symmetric  Neurologic: Grossly normal  Genital Exam: defer exam    Current Medications:    Current Facility-Administered Medications:     [START ON 2024] furosemide (Lasix) tab 20 mg, 20 mg, Oral, Once    gabapentin (Neurontin) cap 300 mg, 300 mg, Oral, TID    oxyCODONE immediate release tab 5 mg, 5 mg, Oral, Q6H PRN    methocarbamol (Robaxin) tab 750 mg, 750 mg, Oral, Q6H PRN    acetaminophen (Tylenol) tab 325 mg, 325 mg, Oral, Q6H PRN    Allergies  No Known Allergies    Assessment and Plan:     Leg swelling  Patient has leg swelling part of this appears to be edema status postop.  Ultrasound is indicative of old  superficial thrombosis and it is in the superficial system I do not suspect deep venous thrombosis.  For the time being we will monitor carefully avoid IV fluids will give dose of diuretic if needed.       Anemia  Anemia is likely postoperative, hematology is on consult I do not have any other source of blood loss.       Weakness  Weakness is likely multifactorial part of it is postop but I suspect neuro logical involvement postsurgery as well, preoperatively patient had been able to ambulate with some use of a walker.  Based on postoperative instructions from [facility] patient is supposed to wear a back corset and should be ambulating I have placed a PT consult back corset is at bedside nurse was instructed to call physical therapy.       Deep vein thrombosis (DVT) of proximal lower extremity, unspecified chronicity, unspecified laterality (HCC)  Patient does not have a DVT.        Acute pain: Patient will be on oxycodone acetaminophen Neurontin and methocarbamol.     DVT prophylaxis: Patient currently cannot receive anticoagulation or subcu heparin.  In light of no DVT and likely old superficial thrombosis, will place SCD boots.  Discussed with hematologist.     GI prophylaxis: Patient will be on PPI.     Patient currently does not want to follow-up with the primary surgeon at [facility] hence a second opinion has been requested from Dr. Grijalva and will await his recommendations.     July 1, 2024  Patient is more alert and interactive.  Able to transfer to the cardiac chair from bed.  Still reports leg numbness and heaviness.  Neuroexam has been unchanged since admission.  Appreciate recommendation from pain service.  Patient will undergo physical therapy.  Will discharge when stable.    07/02/24  Patient feeling better. Still unable to ambulate. Continue PT. Pain control adequate.    07/03/24  Patient has some  edema bilateral feet. Ordered one dose of lasix 20 mg in AM.  Overall seems  improving. Discussed at length home health and Aide services.   Await disposition and discharge when arrangements are made.     Results:     Lab Results   Component Value Date    WBC 6.7 07/01/2024    HGB 9.8 (L) 07/01/2024    HCT 29.6 (L) 07/01/2024    .0 07/01/2024     07/01/2024    K 4.2 07/01/2024     07/01/2024    CO2 24.0 07/01/2024    CREATSERUM 0.73 07/01/2024    BUN 8 (L) 07/01/2024    GLU 92 07/01/2024    CA 8.5 (L) 07/01/2024    ALB 3.6 07/01/2024    ALKPHO 95 07/01/2024    BILT 0.4 07/01/2024    TP 5.8 07/01/2024    AST 48 (H) 07/01/2024    ALT 37 07/01/2024    TSH 0.900 09/28/2021     08/07/2020    DDIMER 0.35 11/02/2021    ESRML 10 07/11/2022    CRP <0.29 07/11/2022    MG 2.0 07/01/2024    PHOS 2.6 07/01/2024    TROP <0.045 08/07/2020    B12 590 06/30/2024     No results found for this visit on 06/29/24.    No results found.                FRANCIS PEREIRA MD  7/3/2024

## 2024-07-04 NOTE — PROGRESS NOTES
[facility]  part of [facility]    Progress Note    Tess Melendez Patient Status:  Inpatient    7/15/1945 MRN [id number]   Location [facility] 4W/SW/SE Attending Clarita Cardenas MD   Hosp Day # 3 PCP MARCIA NAVARRO       Subjective:   Tess Melendez is a(n) 78 year old female Leg swelling and weakness.    Objective:           Wt Readings from Last 2 Encounters:   24 135 lb 9.6 oz (61.5 kg)   24 130 lb 9.6 oz (59.2 kg)       General appearance:  alert, appears stated age, and cooperative  Head: Normocephalic, without obvious abnormality, atraumatic  Eyes: conjunctivae/corneas clear. PERRL, EOM's intact. Fundi benign.  Neck: no adenopathy, no carotid bruit, no JVD, supple, symmetrical, trachea midline, and thyroid not enlarged, symmetric, no tenderness/mass/nodules  Pulmonary: clear to auscultation bilaterally  Cardiovascular: S1, S2 normal, no murmur, click, rub or gallop, regular rate and rhythm  Abdominal: soft, non-tender; bowel sounds normal; no masses,  no organomegaly  Extremities: extremities normal, atraumatic, no cyanosis or edema  Pulses: 2+ and symmetric  Neurologic: Grossly normal  Genital Exam: defer exam    Current Medications:    Current Facility-Administered Medications:     [START ON 2024] furosemide (Lasix) tab 20 mg, 20 mg, Oral, Once    gabapentin (Neurontin) cap 300 mg, 300 mg, Oral, TID    oxyCODONE immediate release tab 5 mg, 5 mg, Oral, Q6H PRN    methocarbamol (Robaxin) tab 750 mg, 750 mg, Oral, Q6H PRN    acetaminophen (Tylenol) tab 325 mg, 325 mg, Oral, Q6H PRN    Allergies  No Known Allergies    Assessment and Plan:     Leg swelling      Anemia      Weakness      Deep vein thrombosis (DVT) of proximal lower extremity, unspecified chronicity, unspecified laterality (HCC)          DVT Prophylaxis:    GI Prophylaxis:    Leg swelling  Patient has leg swelling part of this appears to be edema status postop.  Ultrasound is indicative of old  superficial thrombosis and it is in the superficial system I do not suspect deep venous thrombosis.  For the time being we will monitor carefully avoid IV fluids will give dose of diuretic if needed.       Anemia  Anemia is likely postoperative, hematology is on consult I do not have any other source of blood loss.       Weakness  Weakness is likely multifactorial part of it is postop but I suspect neuro logical involvement postsurgery as well, preoperatively patient had been able to ambulate with some use of a walker.  Based on postoperative instructions from [facility] patient is supposed to wear a back corset and should be ambulating I have placed a PT consult back corset is at bedside nurse was instructed to call physical therapy.       Deep vein thrombosis (DVT) of proximal lower extremity, unspecified chronicity, unspecified laterality (HCC)  Patient does not have a DVT.        Acute pain: Patient will be on oxycodone acetaminophen Neurontin and methocarbamol.     DVT prophylaxis: Patient currently cannot receive anticoagulation or subcu heparin.  In light of no DVT and likely old superficial thrombosis, will place SCD boots.  Discussed with hematologist.     GI prophylaxis: Patient will be on PPI.     Patient currently does not want to follow-up with the primary surgeon at [facility] hence a second opinion has been requested from Dr. Grijalva and will await his recommendations.     July 1, 2024  Patient is more alert and interactive.  Able to transfer to the cardiac chair from bed.  Still reports leg numbness and heaviness.  Neuroexam has been unchanged since admission.  Appreciate recommendation from pain service.  Patient will undergo physical therapy.  Will discharge when stable.    07/02/24  Patient feeling better. Still unable to ambulate. Continue PT. Pain control adequate.    Results:     Lab Results   Component Value Date    WBC 6.7 07/01/2024    HGB 9.8 (L) 07/01/2024    HCT  29.6 (L) 07/01/2024    .0 07/01/2024     07/01/2024    K 4.2 07/01/2024     07/01/2024    CO2 24.0 07/01/2024    CREATSERUM 0.73 07/01/2024    BUN 8 (L) 07/01/2024    GLU 92 07/01/2024    CA 8.5 (L) 07/01/2024    ALB 3.6 07/01/2024    ALKPHO 95 07/01/2024    BILT 0.4 07/01/2024    TP 5.8 07/01/2024    AST 48 (H) 07/01/2024    ALT 37 07/01/2024    TSH 0.900 09/28/2021     08/07/2020    DDIMER 0.35 11/02/2021    ESRML 10 07/11/2022    CRP <0.29 07/11/2022    MG 2.0 07/01/2024    PHOS 2.6 07/01/2024    TROP <0.045 08/07/2020    B12 590 06/30/2024     No results found for this visit on 06/29/24.    No results found.                FRANCIS PERIERA MD  7/2/2024

## 2024-07-04 NOTE — PLAN OF CARE
Patient is A&Ox4. VSS. RA. POD#8. Dressing in lower back is C/D/I. Up SBA assist and walker. Pain being managed with robaxin, oxycodone, and tylenol. Non skid socks are in place, bed is to the lowest point. Call light and personal belongins are within reach. Plan is for home with home health.   Problem: Patient Centered Care  Goal: Patient preferences are identified and integrated in the patient's plan of care  Description: Interventions:  - What would you like us to know as we care for you? Be able to ambulate  - Provide timely, complete, and accurate information to patient/family  - Incorporate patient and family knowledge, values, beliefs, and cultural backgrounds into the planning and delivery of care  - Encourage patient/family to participate in care and decision-making at the level they choose  - Honor patient and family perspectives and choices  Outcome: Progressing     Problem: PAIN - ADULT  Goal: Verbalizes/displays adequate comfort level or patient's stated pain goal  Description: INTERVENTIONS:  - Encourage pt to monitor pain and request assistance  - Assess pain using appropriate pain scale  - Administer analgesics based on type and severity of pain and evaluate response  - Implement non-pharmacological measures as appropriate and evaluate response  - Consider cultural and social influences on pain and pain management  - Manage/alleviate anxiety  - Utilize distraction and/or relaxation techniques  - Monitor for opioid side effects  - Notify MD/LIP if interventions unsuccessful or patient reports new pain  - Anticipate increased pain with activity and pre-medicate as appropriate  Outcome: Progressing     Problem: SAFETY ADULT - FALL  Goal: Free from fall injury  Description: INTERVENTIONS:  - Assess pt frequently for physical needs  - Identify cognitive and physical deficits and behaviors that affect risk of falls.  - [place] fall precautions as indicated by assessment.  - Educate pt/family on patient  safety including physical limitations  - Instruct pt to call for assistance with activity based on assessment  - Modify environment to reduce risk of injury  - Provide assistive devices as appropriate  - Consider OT/PT consult to assist with strengthening/mobility  - Encourage toileting schedule  Outcome: Progressing     Problem: RISK FOR INFECTION - ADULT  Goal: Absence of fever/infection during anticipated neutropenic period  Description: INTERVENTIONS  - Monitor WBC  - Administer growth factors as ordered  - Implement neutropenic guidelines  Outcome: Progressing     Problem: DISCHARGE PLANNING  Goal: Discharge to home or other facility with appropriate resources  Description: INTERVENTIONS:  - Identify barriers to discharge w/pt and caregiver  - Include patient/family/discharge partner in discharge planning  - Arrange for needed discharge resources and transportation as appropriate  - Identify discharge learning needs (meds, wound care, etc)  - Arrange for interpreters to assist at discharge as needed  - Consider post-discharge preferences of patient/family/discharge partner  - Complete POLST form as appropriate  - Assess patient's ability to be responsible for managing their own health  - Refer to Case Management Department for coordinating discharge planning if the patient needs post-hospital services based on physician/LIP order or complex needs related to functional status, cognitive ability or social support system  Outcome: Progressing     Problem: Altered Communication/Language Barrier  Goal: Patient/Family is able to understand and participate in their care  Description: Interventions:  - Assess communication ability and preferred communication style  - Implement communication aides and strategies  - Use visual cues when possible  - Listen attentively, be patient, do not interrupt  - Minimize distractions  - Allow time for understanding and response  - Establish method for patient to ask for assistance  (call light)  - Provide an  as needed  - Communicate barriers and strategies to overcome with those who interact with patient  Outcome: Progressing

## 2024-07-04 NOTE — PLAN OF CARE
Patient is alert and oriented x4 on room air. She is stand by assist with walker. SCDs are on for DVT prophylaxis. PRN Robaxin, Tylenol, and oxy given for pain control. She voiding freely. Dressing is CDI. Bed is at the lowest position, call light within reach. Plan is for home with home health once medically cleared.  Problem: Patient Centered Care  Goal: Patient preferences are identified and integrated in the patient's plan of care  Description: Interventions:  - What would you like us to know as we care for you? Be able to ambulate  - Provide timely, complete, and accurate information to patient/family  - Incorporate patient and family knowledge, values, beliefs, and cultural backgrounds into the planning and delivery of care  - Encourage patient/family to participate in care and decision-making at the level they choose  - Honor patient and family perspectives and choices  Outcome: Progressing     Problem: PAIN - ADULT  Goal: Verbalizes/displays adequate comfort level or patient's stated pain goal  Description: INTERVENTIONS:  - Encourage pt to monitor pain and request assistance  - Assess pain using appropriate pain scale  - Administer analgesics based on type and severity of pain and evaluate response  - Implement non-pharmacological measures as appropriate and evaluate response  - Consider cultural and social influences on pain and pain management  - Manage/alleviate anxiety  - Utilize distraction and/or relaxation techniques  - Monitor for opioid side effects  - Notify MD/LIP if interventions unsuccessful or patient reports new pain  - Anticipate increased pain with activity and pre-medicate as appropriate  Outcome: Progressing     Problem: SAFETY ADULT - FALL  Goal: Free from fall injury  Description: INTERVENTIONS:  - Assess pt frequently for physical needs  - Identify cognitive and physical deficits and behaviors that affect risk of falls.  - [place] fall precautions as indicated by assessment.  -  Educate pt/family on patient safety including physical limitations  - Instruct pt to call for assistance with activity based on assessment  - Modify environment to reduce risk of injury  - Provide assistive devices as appropriate  - Consider OT/PT consult to assist with strengthening/mobility  - Encourage toileting schedule  Outcome: Progressing     Problem: RISK FOR INFECTION - ADULT  Goal: Absence of fever/infection during anticipated neutropenic period  Description: INTERVENTIONS  - Monitor WBC  - Administer growth factors as ordered  - Implement neutropenic guidelines  Outcome: Progressing     Problem: DISCHARGE PLANNING  Goal: Discharge to home or other facility with appropriate resources  Description: INTERVENTIONS:  - Identify barriers to discharge w/pt and caregiver  - Include patient/family/discharge partner in discharge planning  - Arrange for needed discharge resources and transportation as appropriate  - Identify discharge learning needs (meds, wound care, etc)  - Arrange for interpreters to assist at discharge as needed  - Consider post-discharge preferences of patient/family/discharge partner  - Complete POLST form as appropriate  - Assess patient's ability to be responsible for managing their own health  - Refer to Case Management Department for coordinating discharge planning if the patient needs post-hospital services based on physician/LIP order or complex needs related to functional status, cognitive ability or social support system  Outcome: Progressing     Problem: Altered Communication/Language Barrier  Goal: Patient/Family is able to understand and participate in their care  Description: Interventions:  - Assess communication ability and preferred communication style  - Implement communication aides and strategies  - Use visual cues when possible  - Listen attentively, be patient, do not interrupt  - Minimize distractions  - Allow time for understanding and response  - Establish method for  patient to ask for assistance (call light)  - Provide an  as needed  - Communicate barriers and strategies to overcome with those who interact with patient  Outcome: Progressing

## 2024-07-05 LAB — MAGNESIUM SERPL-MCNC: 2.1 MG/DL (ref 1.6–2.6)

## 2024-07-05 PROCEDURE — 99223 1ST HOSP IP/OBS HIGH 75: CPT

## 2024-07-05 RX ORDER — OXYCODONE HYDROCHLORIDE 5 MG/1
5 TABLET ORAL EVERY 6 HOURS PRN
Qty: 28 TABLET | Refills: 0 | Status: ON HOLD | OUTPATIENT
Start: 2024-07-05 | End: 2024-07-20

## 2024-07-05 RX ORDER — HYDROMORPHONE HYDROCHLORIDE 1 MG/ML
0.5 INJECTION, SOLUTION INTRAMUSCULAR; INTRAVENOUS; SUBCUTANEOUS ONCE
Status: COMPLETED | OUTPATIENT
Start: 2024-07-05 | End: 2024-07-05

## 2024-07-05 RX ORDER — GABAPENTIN 300 MG/1
300 CAPSULE ORAL 3 TIMES DAILY
Qty: 90 CAPSULE | Refills: 0 | Status: ON HOLD | OUTPATIENT
Start: 2024-07-05 | End: 2024-08-04

## 2024-07-05 RX ORDER — NALOXONE HYDROCHLORIDE 4 MG/.1ML
4 SPRAY NASAL AS NEEDED
Qty: 1 KIT | Refills: 0 | Status: ON HOLD | OUTPATIENT
Start: 2024-07-05 | End: 2024-07-12

## 2024-07-05 NOTE — PHYSICAL THERAPY NOTE
PHYSICAL THERAPY TREATMENT NOTE - INPATIENT     Room Number: 410/410-A       Presenting Problem: leg swelling and weakness. s/p lumbar fusion L4-5 6/24/24 at outside hospital  Co-Morbidities : R LE DVT, s/p R JEREMIAS    Problem List  Principal Problem:    Leg swelling  Active Problems:    Anemia    Weakness    Deep vein thrombosis (DVT) of proximal lower extremity, unspecified chronicity, unspecified laterality (HCC)      PHYSICAL THERAPY ASSESSMENT   Patient demonstrates good  progress this session, goals  remain in progress.    Patient continues to function below baseline with bed mobility, transfers, gait, stair negotiation, and standing prolonged periods.  Contributing factors to remaining limitations include decreased functional strength, decreased endurance/aerobic capacity, and decreased muscular endurance.  Next session anticipate patient to progress bed mobility, transfers, gait, stair negotiation, and standing prolonged periods.  Physical Therapy will continue to follow patient for duration of hospitalization.    Patient continues to benefit from continued skilled PT services: at discharge to promote functional independence and safety with additional support and return home with home health PT.    PLAN  PT Treatment Plan: Bed mobility;Body mechanics;Coordination;Endurance;Don/doff brace;Patient education;Gait training;Strengthening;Stoop training;Stair training;Transfer training;Balance training  Frequency (Obs): Daily    SUBJECTIVE  Pt was agreeable to therapy session.         OBJECTIVE  Precautions: Lumbar brace    WEIGHT BEARING RESTRICTION        R Lower Extremity: Full Weight Bearing  L Lower Extremity: Full Weight Bearing    PAIN ASSESSMENT   Rating:  (did not rate)  Location: BLE  Management Techniques: Activity promotion;Body mechanics;Relaxation;Repositioning    BALANCE  Static Sitting: Good  Dynamic Sitting: Fair +  Static Standing: Fair  Dynamic Standing: Fair -    ACTIVITY TOLERANCE                           O2 WALK       AM-PAC '6-Clicks' INPATIENT SHORT FORM - BASIC MOBILITY  How much difficulty does the patient currently have...  Patient Difficulty: Turning over in bed (including adjusting bedclothes, sheets and blankets)?: A Little   Patient Difficulty: Sitting down on and standing up from a chair with arms (e.g., wheelchair, bedside commode, etc.): A Little   Patient Difficulty: Moving from lying on back to sitting on the side of the bed?: A Little   How much help from another person does the patient currently need...   Help from Another: Moving to and from a bed to a chair (including a wheelchair)?: A Little   Help from Another: Need to walk in hospital room?: A Little   Help from Another: Climbing 3-5 steps with a railing?: A Little     AM-PAC Score:  Raw Score: 18   Approx Degree of Impairment: 46.58%   Standardized Score (AM-PAC Scale): 43.63   CMS Modifier (G-Code): CK    FUNCTIONAL ABILITY STATUS  Functional Mobility/Gait Assessment  Gait Assistance: Supervision  Distance (ft): 250'  Assistive Device: Rolling walker  Pattern: Shuffle (narrow JOHANNE)  Stairs: Stairs  How Many Stairs: 4  Device: 1 Rail  Assist: Contact guard assist  Pattern: Ascend and Descend  Ascend and Descend : Step to  Rolling:  NT  Supine to Sit:  NT  Sit to Supine:  NT  Sit to Stand: supervision    Additional information: Pt is received in the chair and was cleared for therapy session. Pt with her LSO brace already donned. Reviewed and pt maintained all her spinal precautions throughout the session. Pt is SBA with sit<>stand transfers with the RW. Pt was able to AMB about 250' with the RW SBA for balance and safety. Pt with decreased julia and step length with narrow JOHANNE but with very good balance and safety awareness. Pt AMB to the stair well for stair training. Pt was educated and able to negotiate 4 stairs with 1 HR CGA for safety. Pt was cued for proper sequencing and technique. Pt with good balance on the stairs. Returned  pt back to the room and to sitting in the chair with all needs within reach. Pt is on track to dc to home once medically cleared. Reported to the RN on the status of the pt.     The patient's Approx Degree of Impairment: 46.58% has been calculated based on documentation in the [facility] '6 clicks' Inpatient Daily Activity Short Form.  Research supports that patients with this level of impairment may benefit from Home with HHC and assist.  Final disposition will be made by interdisciplinary medical team.        Patient End of Session: Up in chair;Needs met;Call light within reach;RN aware of session/findings;All patient questions and concerns addressed    CURRENT GOALS   Goals to be met by: 7/8/24  Patient Goal Patient's self-stated goal is: return to PLOF   Goal #1 Patient is able to demonstrate supine - sit EOB @ level: supervision     Goal #1   Current Status NT received in the chair   Goal #2 Patient is able to demonstrate transfers Sit to/from Stand at assistance level: Supervision with walker - rolling     Goal #2  Current Status SBA with the RW   Goal #3 Patient is able to ambulate 50 feet with assist device: walker - rolling at assistance level: Supervision   Goal #3   Current Status 250' with the RW SBA   Goal #4 Patient will negotiate 2 stairs/one curb w/ assistive device and CGA   Goal #4   Current Status Pt negotiated 4 stairs with 1 HR CGA    Goal #5 Patient to demonstrate independence with home activity/exercise instructions provided to patient in preparation for discharge.   Goal #5   Current Status IN PROGRESS       Therapeutic Activity: 25 minutes

## 2024-07-05 NOTE — PAYOR COMM NOTE
--------------  7/2 7/3: CONTINUED STAY REVIEW    Payor: [employer] PPO  Subscriber #:  [id number]  Authorization Number: [id number]    Admit date: 6/29/24  Admit time:  9:24 PM    HOSPITALIST:    Subjective:   Tess Melendez is a(n) 78 year old female Leg swelling and weakness.     Objective:                   Wt Readings from Last 2 Encounters:   06/29/24 135 lb 9.6 oz (61.5 kg)   06/18/24 130 lb 9.6 oz (59.2 kg)         General appearance:  alert, appears stated age, and cooperative  Head: Normocephalic, without obvious abnormality, atraumatic  Eyes: conjunctivae/corneas clear. PERRL, EOM's intact. Fundi benign.  Neck: no adenopathy, no carotid bruit, no JVD, supple, symmetrical, trachea midline, and thyroid not enlarged, symmetric, no tenderness/mass/nodules  Pulmonary: clear to auscultation bilaterally  Cardiovascular: S1, S2 normal, no murmur, click, rub or gallop, regular rate and rhythm  Abdominal: soft, non-tender; bowel sounds normal; no masses,  no organomegaly  Extremities: extremities normal, atraumatic, no cyanosis or edema  Pulses: 2+ and symmetric  Neurologic: Grossly normal  Genital Exam: defer exam     Current Medications:    Current Hospital Medications      Current Facility-Administered Medications:     [START ON 7/4/2024] furosemide (Lasix) tab 20 mg, 20 mg, Oral, Once    gabapentin (Neurontin) cap 300 mg, 300 mg, Oral, TID    oxyCODONE immediate release tab 5 mg, 5 mg, Oral, Q6H PRN    methocarbamol (Robaxin) tab 750 mg, 750 mg, Oral, Q6H PRN    acetaminophen (Tylenol) tab 325 mg, 325 mg, Oral, Q6H PRN        Allergies  Allergies   No Known Allergies        Assessment and Plan:     Leg swelling       Anemia       Weakness       Deep vein thrombosis (DVT) of proximal lower extremity, unspecified chronicity, unspecified laterality (HCC)              DVT Prophylaxis:     GI Prophylaxis:     Leg swelling  Patient has leg swelling part of this appears to be edema status postop.  Ultrasound is  indicative of old superficial thrombosis and it is in the superficial system I do not suspect deep venous thrombosis.  For the time being we will monitor carefully avoid IV fluids will give dose of diuretic if needed.       Anemia  Anemia is likely postoperative, hematology is on consult I do not have any other source of blood loss.       Weakness  Weakness is likely multifactorial part of it is postop but I suspect neuro logical involvement postsurgery as well, preoperatively patient had been able to ambulate with some use of a walker.  Based on postoperative instructions from [facility] patient is supposed to wear a back corset and should be ambulating I have placed a PT consult back corset is at bedside nurse was instructed to call physical therapy.       Deep vein thrombosis (DVT) of proximal lower extremity, unspecified chronicity, unspecified laterality (HCC)  Patient does not have a DVT.        Acute pain: Patient will be on oxycodone acetaminophen Neurontin and methocarbamol.     DVT prophylaxis: Patient currently cannot receive anticoagulation or subcu heparin.  In light of no DVT and likely old superficial thrombosis, will place SCD boots.  Discussed with hematologist.     GI prophylaxis: Patient will be on PPI.     Patient currently does not want to follow-up with the primary surgeon at [facility] hence a second opinion has been requested from Dr. Grijalva and will await his recommendations.     July 1, 2024  Patient is more alert and interactive.  Able to transfer to the cardiac chair from bed.  Still reports leg numbness and heaviness.  Neuroexam has been unchanged since admission.  Appreciate recommendation from pain service.  Patient will undergo physical therapy.  Will discharge when stable.     07/02/24  Patient feeling better. Still unable to ambulate. Continue PT. Pain control adequate.     Results:            Lab Results   Component Value Date     WBC 6.7 07/01/2024     HGB  9.8 (L) 07/01/2024     HCT 29.6 (L) 07/01/2024     .0 07/01/2024      07/01/2024     K 4.2 07/01/2024      07/01/2024     CO2 24.0 07/01/2024     CREATSERUM 0.73 07/01/2024     BUN 8 (L) 07/01/2024     GLU 92 07/01/2024     CA 8.5 (L) 07/01/2024     ALB 3.6 07/01/2024     ALKPHO 95 07/01/2024     BILT 0.4 07/01/2024     TP 5.8 07/01/2024     AST 48 (H) 07/01/2024     ALT 37 07/01/2024     TSH 0.900 09/28/2021      08/07/2020     DDIMER 0.35 11/02/2021     ESRML 10 07/11/2022     CRP <0.29 07/11/2022     MG 2.0 07/01/2024     PHOS 2.6 07/01/2024     TROP <0.045 08/07/2020     B12 590 06/30/2024      No results found for this visit on 06/29/24.     No results found.                     FRANCIS PEREIRA MD  7/2/2024                    7/3  HOSPITALIST:       Subjective:   Tess Melendez is a(n) 78 year old female  Back pain and leg swelling     Objective:          Vitals:     07/03/24 2016   BP: 119/58   Pulse: 82   Resp: 15   Temp: 99.1 °F (37.3 °C)         Intake/Output Summary (Last 24 hours) at 7/3/2024 2245  Last data filed at 7/3/2024 0520      Gross per 24 hour   Intake 200 ml   Output 500 ml   Net -300 ml          Wt Readings from Last 2 Encounters:   06/29/24 135 lb 9.6 oz (61.5 kg)   06/18/24 130 lb 9.6 oz (59.2 kg)         General appearance:  alert, appears stated age, and cooperative  Head: Normocephalic, without obvious abnormality, atraumatic  Eyes: conjunctivae/corneas clear. PERRL, EOM's intact. Fundi benign.  Neck: no adenopathy, no carotid bruit, no JVD, supple, symmetrical, trachea midline, and thyroid not enlarged, symmetric, no tenderness/mass/nodules  Pulmonary: clear to auscultation bilaterally  Cardiovascular: S1, S2 normal, no murmur, click, rub or gallop, regular rate and rhythm  Abdominal: soft, non-tender; bowel sounds normal; no masses,  no organomegaly  Extremities:  +  Pulses: 2+ and symmetric  Neurologic: Grossly normal  Genital Exam: defer exam     Current  Medications:    Current Hospital Medications      Current Facility-Administered Medications:     [START ON 7/4/2024] furosemide (Lasix) tab 20 mg, 20 mg, Oral, Once    gabapentin (Neurontin) cap 300 mg, 300 mg, Oral, TID    oxyCODONE immediate release tab 5 mg, 5 mg, Oral, Q6H PRN    methocarbamol (Robaxin) tab 750 mg, 750 mg, Oral, Q6H PRN    acetaminophen (Tylenol) tab 325 mg, 325 mg, Oral, Q6H PRN        Allergies  Allergies   No Known Allergies        Assessment and Plan:     Leg swelling  Patient has leg swelling part of this appears to be edema status postop.  Ultrasound is indicative of old superficial thrombosis and it is in the superficial system I do not suspect deep venous thrombosis.  For the time being we will monitor carefully avoid IV fluids will give dose of diuretic if needed.       Anemia  Anemia is likely postoperative, hematology is on consult I do not have any other source of blood loss.       Weakness  Weakness is likely multifactorial part of it is postop but I suspect neuro logical involvement postsurgery as well, preoperatively patient had been able to ambulate with some use of a walker.  Based on postoperative instructions from [facility] patient is supposed to wear a back corset and should be ambulating I have placed a PT consult back corset is at bedside nurse was instructed to call physical therapy.       Deep vein thrombosis (DVT) of proximal lower extremity, unspecified chronicity, unspecified laterality (HCC)  Patient does not have a DVT.        Acute pain: Patient will be on oxycodone acetaminophen Neurontin and methocarbamol.     DVT prophylaxis: Patient currently cannot receive anticoagulation or subcu heparin.  In light of no DVT and likely old superficial thrombosis, will place SCD boots.  Discussed with hematologist.     GI prophylaxis: Patient will be on PPI.     Patient currently does not want to follow-up with the primary surgeon at [facility] hence  a second opinion has been requested from Dr. Grijalva and will await his recommendations.     July 1, 2024  Patient is more alert and interactive.  Able to transfer to the cardiac chair from bed.  Still reports leg numbness and heaviness.  Neuroexam has been unchanged since admission.  Appreciate recommendation from pain service.  Patient will undergo physical therapy.  Will discharge when stable.     07/02/24  Patient feeling better. Still unable to ambulate. Continue PT. Pain control adequate.     07/03/24  Patient has some  edema bilateral feet. Ordered one dose of lasix 20 mg in AM.  Overall seems improving. Discussed at length home health and Aide services.   Await disposition and discharge when arrangements are made.     Results:            Lab Results   Component Value Date     WBC 6.7 07/01/2024     HGB 9.8 (L) 07/01/2024     HCT 29.6 (L) 07/01/2024     .0 07/01/2024      07/01/2024     K 4.2 07/01/2024      07/01/2024     CO2 24.0 07/01/2024     CREATSERUM 0.73 07/01/2024     BUN 8 (L) 07/01/2024     GLU 92 07/01/2024     CA 8.5 (L) 07/01/2024     ALB 3.6 07/01/2024     ALKPHO 95 07/01/2024     BILT 0.4 07/01/2024     TP 5.8 07/01/2024     AST 48 (H) 07/01/2024     ALT 37 07/01/2024     TSH 0.900 09/28/2021      08/07/2020     DDIMER 0.35 11/02/2021     ESRML 10 07/11/2022     CRP <0.29 07/11/2022     MG 2.0 07/01/2024     PHOS 2.6 07/01/2024     TROP <0.045 08/07/2020     B12 590 06/30/2024          FRANCIS PEREIRA MD     MEDICATIONS ADMINISTERED IN LAST 1 DAY:  furosemide (Lasix) tab 20 mg       Date Action Dose Route User    7/4/2024 0844 Given 20 mg Oral Blanca Carbone, RN          gabapentin (Neurontin) cap 300 mg       Date Action Dose Route User    7/4/2024 2123 Given 300 mg Oral Kaleb Aleman RN    7/4/2024 1531 Given 300 mg Oral Blanca Carbone RN    7/4/2024 0844 Given 300 mg Oral Blanca Carbone RN          magnesium oxide (Mag-Ox) tab 400 mg       Date Action Dose  Route User    7/4/2024 1808 Given 400 mg Oral Blanca Carbone RN          oxyCODONE immediate release tab 5 mg       Date Action Dose Route User    7/4/2024 2123 Given 5 mg Oral Kaleb Aleman RN            Vitals (last day)       Date/Time Temp Pulse Resp BP SpO2 Weight O2 Device O2 Flow Rate (L/min) [facility]    07/05/24 0408 98.9 °F (37.2 °C) 64 16 127/57 98 % -- None (Room air) --     07/04/24 1951 99.5 °F (37.5 °C) 91 16 100/84 97 % -- None (Room air) --     07/04/24 1527 99.7 °F (37.6 °C) 88 16 97/48 98 % -- None (Room air) --     07/04/24 0830 98.7 °F (37.1 °C) 68 18 100/54 97 % -- None (Room air) --     07/04/24 0436 98.6 °F (37 °C) 65 16 118/65 93 % -- None (Room air) -- AC

## 2024-07-05 NOTE — PLAN OF CARE
Problem: Patient Centered Care  Goal: Patient preferences are identified and integrated in the patient's plan of care  Description: Interventions:  - What would you like us to know as we care for you? Be able to ambulate  - Provide timely, complete, and accurate information to patient/family  - Incorporate patient and family knowledge, values, beliefs, and cultural backgrounds into the planning and delivery of care  - Encourage patient/family to participate in care and decision-making at the level they choose  - Honor patient and family perspectives and choices  Outcome: Progressing     Problem: PAIN - ADULT  Goal: Verbalizes/displays adequate comfort level or patient's stated pain goal  Description: INTERVENTIONS:  - Encourage pt to monitor pain and request assistance  - Assess pain using appropriate pain scale  - Administer analgesics based on type and severity of pain and evaluate response  - Implement non-pharmacological measures as appropriate and evaluate response  - Consider cultural and social influences on pain and pain management  - Manage/alleviate anxiety  - Utilize distraction and/or relaxation techniques  - Monitor for opioid side effects  - Notify MD/LIP if interventions unsuccessful or patient reports new pain  - Anticipate increased pain with activity and pre-medicate as appropriate  Outcome: Progressing     Problem: SAFETY ADULT - FALL  Goal: Free from fall injury  Description: INTERVENTIONS:  - Assess pt frequently for physical needs  - Identify cognitive and physical deficits and behaviors that affect risk of falls.  - [place] fall precautions as indicated by assessment.  - Educate pt/family on patient safety including physical limitations  - Instruct pt to call for assistance with activity based on assessment  - Modify environment to reduce risk of injury  - Provide assistive devices as appropriate  - Consider OT/PT consult to assist with strengthening/mobility  - Encourage toileting  schedule  Outcome: Progressing     Problem: RISK FOR INFECTION - ADULT  Goal: Absence of fever/infection during anticipated neutropenic period  Description: INTERVENTIONS  - Monitor WBC  - Administer growth factors as ordered  - Implement neutropenic guidelines  Outcome: Progressing     Problem: DISCHARGE PLANNING  Goal: Discharge to home or other facility with appropriate resources  Description: INTERVENTIONS:  - Identify barriers to discharge w/pt and caregiver  - Include patient/family/discharge partner in discharge planning  - Arrange for needed discharge resources and transportation as appropriate  - Identify discharge learning needs (meds, wound care, etc)  - Arrange for interpreters to assist at discharge as needed  - Consider post-discharge preferences of patient/family/discharge partner  - Complete POLST form as appropriate  - Assess patient's ability to be responsible for managing their own health  - Refer to Case Management Department for coordinating discharge planning if the patient needs post-hospital services based on physician/LIP order or complex needs related to functional status, cognitive ability or social support system  Outcome: Progressing     Problem: Altered Communication/Language Barrier  Goal: Patient/Family is able to understand and participate in their care  Description: Interventions:  - Assess communication ability and preferred communication style  - Implement communication aides and strategies  - Use visual cues when possible  - Listen attentively, be patient, do not interrupt  - Minimize distractions  - Allow time for understanding and response  - Establish method for patient to ask for assistance (call light)  - Provide an  as needed  - Communicate barriers and strategies to overcome with those who interact with patient  Outcome: Progressing     Monitoring vital signs, stable at this moment. Pain medication provided as needed. Wound care provided, lap sites CDI. Call  light within reach, bed locked in lowest position, all fall precautions in place. All needs addressed. Frequent rounding maintained by nursing staff. Patient pain controlled overnight utilizing PRN medications. Back brace used when ambulating or sitting upright. No acute changes at this time.

## 2024-07-05 NOTE — CONSULTS
[facility]  part of [facility]    Neurosurgery Consultation    Tess Melendez Patient Status:  Inpatient    7/15/1945 MRN [id number]   Location [facility] 4W/SW/SE Attending Clarita Cardenas MD   Hosp Day # 6 PCP MARCIA NAVARRO     Date of Admission:  2024  Date of Consult:  2024    Reason for Consultation:  Low back pain and lower extremity swelling    History of Present Illness:  Tess Melendez is a a(n) 78 year old female with past medical history of severe scoliosis and recent L4-5 MIS TLIF completed on  at [facility].  Patient was discharged from hospital day 5 patient feels this was earlier than expected. She subsequently readmitted to [facility] on  with bilateral lower extremity swelling and inability to walk due to weakness.  Patient states her operation was supposed to be small but \"something wrong happened.\"  Patient was having severe low back pain and leg pain and numbness upon admission.  DVT was ruled out in the emergency department.  She was given diuretics and IV fluids were held.    Today, patient endorsing improvement in her lower extremity swelling, back pain and leg pain.  She reports diffuse lower extremity numbness but feels it is slowly improving.  She denies any bowel or bladder changes or saddle anesthesia.  Her incisions are well-approximated and without signs of infection.    History:  Past Medical History:    Endometriosis    Parkinson's disease (HCC)    pt denies -- states her 's diagnosis    Pelvic kidney    left side    Scoliosis     Past Surgical History:   Procedure Laterality Date    Hysterectomy      partial in her 30s for endometriosis    Other surgical history  2018    Right hip replacement     Family History   Problem Relation Age of Onset    Heart Attack Mother     Cancer Neg     Diabetes Neg     Hypertension Neg       reports that she has never smoked. She has never used smokeless tobacco. She  reports that she does not drink alcohol and does not use drugs.    Allergies:  No Known Allergies    Medications:    Current Facility-Administered Medications:     furosemide (Lasix) tab 20 mg, 20 mg, Oral, QAM    gabapentin (Neurontin) cap 300 mg, 300 mg, Oral, TID    oxyCODONE immediate release tab 5 mg, 5 mg, Oral, Q6H PRN    methocarbamol (Robaxin) tab 750 mg, 750 mg, Oral, Q6H PRN    acetaminophen (Tylenol) tab 325 mg, 325 mg, Oral, Q6H PRN    Review of Systems:  A 10-point system was reviewed.  Pertinent positives and negatives are noted in HPI.      Physical Exam:  Temp:  [98.7 °F (37.1 °C)-99.5 °F (37.5 °C)] 98.7 °F (37.1 °C)  Pulse:  [63-91] 63  Resp:  [16-18] 18  BP: (100-134)/(57-84) 134/67  SpO2:  [97 %-98 %] 97 %  I/O last 3 completed shifts:  In: 160 [P.O.:160]  Out: 2050 [Urine:2050]      Neurological Exam:  Alert and oriented x 3  Follows commands    Strength:  Bilateral lower extremity dorsiflexion symmetrical and 5 out of 5  Bilateral lower extremity EHL symmetrical and 5 out of 5    Sensation:  Patchy asymmetry in the bilateral lower extremities throughout    DTRs:  Absent in bilateral lower extremity patellar and Achilles    Long tract signs:  Absent clonus in bilateral lower extremities    Incisions:  2 parallel incisions lateral to the midline in the lumbar region which are well-approximated and without signs of erythema, discharge    Abdomen:  Soft, non-distended, non-tender, with no rebound or guarding.  No peritoneal signs.     Extremities:  Non-tender, no lower extremity edema noted.      Labs:  Lab Results   Component Value Date    WBC 6.9 07/04/2024    HGB 9.1 (L) 07/04/2024    .0 07/04/2024    BUN 10 07/04/2024     07/04/2024    K 4.4 07/04/2024    CO2 27.0 07/04/2024    GLU 93 07/04/2024    ALB 3.4 07/04/2024    CRP <0.29 07/11/2022    ESRML 10 07/11/2022       Imaging:  No results found.       Assessment/Plan:  Principal Problem:    Leg swelling  Active Problems:     Anemia    Weakness    Deep vein thrombosis (DVT) of proximal lower extremity, unspecified chronicity, unspecified laterality (HCC)    Ms. Melendez is a 78-year-old female with past medical history of severe scoliosis who underwent L4-5 TLIF on 6/24 and presented to [place] ED for leg swelling, back pain and inability to walk.  Patient's pain and lower extremity swelling have improved since admission.  Patient making steady postoperative recovery.  Continues to endorse back pain and lower extremity numbness.  MRI lumbar spine  Pain control  Medical management per hospitalist team  PT/OT  Further recommendations will be given once MRI is completed.  Will discuss with Dr. Grijalva    More than 60 minutes were spent in consultation and coordination of this patient's care. All questions and concerns were addressed. We appreciate the opportunity to participate in the care of this patient. Please do not hesitate to call our office ([phone]) with any issues.    Solitario Del Angel PA-C  7/5/2024  3:50 PM

## 2024-07-05 NOTE — PROGRESS NOTES
[facility]  part of [facility]    Progress Note    Tess Melendez Patient Status:  Inpatient    7/15/1945 MRN [id number]   Location [facility] 4W/SW/SE Attending Clarita Cardenas MD   Hosp Day # 5 PCP MARCIA NAVARRO       Subjective:   Tess Melendez is a(n) 78 year old female leg numbness, swelling.    Objective:     Vitals:    24   BP: 100/84   Pulse: 91   Resp: 16   Temp: 99.5 °F (37.5 °C)       Intake/Output Summary (Last 24 hours) at 2024 0036  Last data filed at 2024 1812  Gross per 24 hour   Intake --   Output 1850 ml   Net -1850 ml     Wt Readings from Last 2 Encounters:   24 135 lb 9.6 oz (61.5 kg)   24 130 lb 9.6 oz (59.2 kg)       General appearance:  alert, appears stated age, and cooperative  Head: Normocephalic, without obvious abnormality, atraumatic  Eyes: conjunctivae/corneas clear. PERRL, EOM's intact. Fundi benign.  Neck: no adenopathy, no carotid bruit, no JVD, supple, symmetrical, trachea midline, and thyroid not enlarged, symmetric, no tenderness/mass/nodules  Pulmonary: clear to auscultation bilaterally  Cardiovascular: S1, S2 normal, no murmur, click, rub or gallop, regular rate and rhythm  Abdominal: soft, non-tender; bowel sounds normal; no masses,  no organomegaly  Extremities: extremities normal, atraumatic, no cyanosis or edema  Pulses: 2+ and symmetric  Neurologic: Grossly normal  Genital Exam: defer exam    Current Medications:    Current Facility-Administered Medications:     furosemide (Lasix) tab 20 mg, 20 mg, Oral, QAM    gabapentin (Neurontin) cap 300 mg, 300 mg, Oral, TID    oxyCODONE immediate release tab 5 mg, 5 mg, Oral, Q6H PRN    methocarbamol (Robaxin) tab 750 mg, 750 mg, Oral, Q6H PRN    acetaminophen (Tylenol) tab 325 mg, 325 mg, Oral, Q6H PRN    Allergies  No Known Allergies    Assessment and Plan:     Leg swelling  Patient has leg swelling part of this appears to be edema status postop.  Ultrasound is  indicative of old superficial thrombosis and it is in the superficial system I do not suspect deep venous thrombosis.  For the time being we will monitor carefully avoid IV fluids will give dose of diuretic if needed.       Anemia  Anemia is likely postoperative, hematology is on consult I do not have any other source of blood loss.       Weakness  Weakness is likely multifactorial part of it is postop but I suspect neuro logical involvement postsurgery as well, preoperatively patient had been able to ambulate with some use of a walker.  Based on postoperative instructions from [facility] patient is supposed to wear a back corset and should be ambulating I have placed a PT consult back corset is at bedside nurse was instructed to call physical therapy.       Deep vein thrombosis (DVT) of proximal lower extremity, unspecified chronicity, unspecified laterality (HCC)  Patient does not have a DVT.        Acute pain: Patient will be on oxycodone acetaminophen Neurontin and methocarbamol.     DVT prophylaxis: Patient currently cannot receive anticoagulation or subcu heparin.  In light of no DVT and likely old superficial thrombosis, will place SCD boots.  Discussed with hematologist.     GI prophylaxis: Patient will be on PPI.     Patient currently does not want to follow-up with the primary surgeon at [facility] hence a second opinion has been requested from Dr. Grijalva and will await his recommendations.     July 1, 2024  Patient is more alert and interactive.  Able to transfer to the cardiac chair from bed.  Still reports leg numbness and heaviness.  Neuroexam has been unchanged since admission.  Appreciate recommendation from pain service.  Patient will undergo physical therapy.  Will discharge when stable.     07/02/24  Patient feeling better. Still unable to ambulate. Continue PT. Pain control adequate.     07/03/24  Patient has some  edema bilateral feet. Ordered one dose of lasix 20 mg in  AM.  Overall seems improving. Discussed at length home health and Aide services.   Await disposition and discharge when arrangements are made.    07/04/24  Patient still has leg edema. Received Lasix 20 mg po with good response. Has improved ambulation. PT ongoing. D/W son. Patient has arrangements for being at home with likely home health needed.     Results:     Lab Results   Component Value Date    WBC 6.9 07/04/2024    HGB 9.1 (L) 07/04/2024    HCT 26.4 (L) 07/04/2024    .0 07/04/2024     07/04/2024    K 4.4 07/04/2024     07/04/2024    CO2 27.0 07/04/2024    CREATSERUM 0.77 07/04/2024    BUN 10 07/04/2024    GLU 93 07/04/2024    CA 8.4 (L) 07/04/2024    ALB 3.4 07/04/2024    ALKPHO 99 07/04/2024    BILT 0.3 07/04/2024    TP 5.6 (L) 07/04/2024    AST 27 07/04/2024    ALT 25 07/04/2024    TSH 0.900 09/28/2021     08/07/2020    DDIMER 0.35 11/02/2021    ESRML 10 07/11/2022    CRP <0.29 07/11/2022    MG 1.8 07/04/2024    PHOS 3.3 07/04/2024    TROP <0.045 08/07/2020    B12 590 06/30/2024     No results found for this visit on 06/29/24.    No results found.                FRANCIS PEREIRA MD  7/4/2024

## 2024-07-06 ENCOUNTER — APPOINTMENT (OUTPATIENT)
Dept: MRI IMAGING | Facility: HOSPITAL | Age: 79
End: 2024-07-06
Payer: MEDICARE

## 2024-07-06 VITALS
OXYGEN SATURATION: 98 % | TEMPERATURE: 98 F | DIASTOLIC BLOOD PRESSURE: 66 MMHG | WEIGHT: 135.63 LBS | RESPIRATION RATE: 16 BRPM | BODY MASS INDEX: 23.15 KG/M2 | SYSTOLIC BLOOD PRESSURE: 133 MMHG | HEART RATE: 66 BPM | HEIGHT: 64 IN

## 2024-07-06 PROBLEM — R53.1 WEAKNESS: Status: RESOLVED | Noted: 2024-06-29 | Resolved: 2024-07-06

## 2024-07-06 PROBLEM — M79.89 LEG SWELLING: Status: RESOLVED | Noted: 2024-06-29 | Resolved: 2024-07-06

## 2024-07-06 PROBLEM — I82.4Y9 DEEP VEIN THROMBOSIS (DVT) OF PROXIMAL LOWER EXTREMITY, UNSPECIFIED CHRONICITY, UNSPECIFIED LATERALITY (HCC): Status: RESOLVED | Noted: 2024-06-29 | Resolved: 2024-07-06

## 2024-07-06 LAB
ALBUMIN SERPL-MCNC: 4 G/DL (ref 3.2–4.8)
ALBUMIN/GLOB SERPL: 1.5 {RATIO} (ref 1–2)
ALP LIVER SERPL-CCNC: 117 U/L
ALT SERPL-CCNC: 21 U/L
ANION GAP SERPL CALC-SCNC: 5 MMOL/L (ref 0–18)
AST SERPL-CCNC: 23 U/L (ref ?–34)
BILIRUB SERPL-MCNC: 0.4 MG/DL (ref 0.2–1.1)
BUN BLD-MCNC: 12 MG/DL (ref 9–23)
BUN/CREAT SERPL: 12.4 (ref 10–20)
CALCIUM BLD-MCNC: 9.1 MG/DL (ref 8.7–10.4)
CHLORIDE SERPL-SCNC: 106 MMOL/L (ref 98–112)
CO2 SERPL-SCNC: 29 MMOL/L (ref 21–32)
CREAT BLD-MCNC: 0.97 MG/DL
DEPRECATED RDW RBC AUTO: 45 FL (ref 35.1–46.3)
EGFRCR SERPLBLD CKD-EPI 2021: 60 ML/MIN/1.73M2 (ref 60–?)
ERYTHROCYTE [DISTWIDTH] IN BLOOD BY AUTOMATED COUNT: 13.2 % (ref 11–15)
GLOBULIN PLAS-MCNC: 2.6 G/DL (ref 2–3.5)
GLUCOSE BLD-MCNC: 106 MG/DL (ref 70–99)
HCT VFR BLD AUTO: 31.8 %
HGB BLD-MCNC: 10.3 G/DL
MAGNESIUM SERPL-MCNC: 2 MG/DL (ref 1.6–2.6)
MCH RBC QN AUTO: 30.2 PG (ref 26–34)
MCHC RBC AUTO-ENTMCNC: 32.4 G/DL (ref 31–37)
MCV RBC AUTO: 93.3 FL
OSMOLALITY SERPL CALC.SUM OF ELEC: 290 MOSM/KG (ref 275–295)
PHOSPHATE SERPL-MCNC: 3.5 MG/DL (ref 2.4–5.1)
PLATELET # BLD AUTO: 409 10(3)UL (ref 150–450)
POTASSIUM SERPL-SCNC: 4.6 MMOL/L (ref 3.5–5.1)
PROT SERPL-MCNC: 6.6 G/DL (ref 5.7–8.2)
RBC # BLD AUTO: 3.41 X10(6)UL
SODIUM SERPL-SCNC: 140 MMOL/L (ref 136–145)
WBC # BLD AUTO: 7.4 X10(3) UL (ref 4–11)

## 2024-07-06 PROCEDURE — 72148 MRI LUMBAR SPINE W/O DYE: CPT

## 2024-07-06 PROCEDURE — 99233 SBSQ HOSP IP/OBS HIGH 50: CPT | Performed by: NEUROLOGICAL SURGERY

## 2024-07-06 RX ORDER — FUROSEMIDE 20 MG/1
20 TABLET ORAL EVERY MORNING
Qty: 5 TABLET | Refills: 0 | Status: ON HOLD | OUTPATIENT
Start: 2024-07-07 | End: 2024-07-12 | Stop reason: ALTCHOICE

## 2024-07-06 NOTE — PROGRESS NOTES
Neurosurgery Progress Note    Assessment:   Tess Melendez in room 410/410-A, is a 78 year old female, Hospital Day ( LOS: 7 days ) hospitalized for Leg swelling.      The patient's other hospital problems include:   Active Hospital Problems    Anemia      *Leg swelling      Weakness      Deep vein thrombosis (DVT) of proximal lower extremity, unspecified chronicity, unspecified laterality (HCC)        Plan:   -No role for acute neurosurgical intervention  -Medical management per hospitalist team  -PT/OT as tolerated  -Recommend outpatient follow-up with her established neurosurgeon, Dr. David Peterson, at [facility]    We will plan to sign off and remain available as needed.    Plan of care discussed with Dr. Dean  Subjective:   The patient endorses continued right lower back pain.  Currently she does not have any lower extremity pain, numbness, or tingling.  She has no new neurologic complaints.  She has been up ambulating with a walker around the room.    Objective:   VITALS: /66 (BP Location: Right arm)   Pulse 66   Temp 98 °F (36.7 °C) (Oral)   Resp 16   Ht 64\"   Wt 135 lb 9.6 oz (61.5 kg)   SpO2 98%   BMI 23.28 kg/m²  Temp (24hrs), Av.2 °F (36.8 °C), Min:97.8 °F (36.6 °C), Max:98.6 °F (37 °C)       General: Well developed, well nourished, in no acute distress.     Incision:  clean, dry, and intact. No signs of gross drainage, warmth, erythema, or hematoma formation     Neurological / Musculoskeletal: Awake, alert, and interactive. Recent and remote memory appear intact. Attention span and concentration are appropriate. No dysarthria. Coordination and motor control grossly intact. Patient follows commands briskly and appropriately.     Lumbar Spine:    Motor / Extremities   Hip   flexion Knee   extension Dorsiflexion Plantarflexion   Sensation   R 5/5 5/5 5/5 5/5 Intact to light touch   L 5/5 5/5 5/5 5/5 Intact to light touch     I&O: I/O last 3 completed shifts:  In: 560  [P.O.:560]  Out: 700 [Urine:700]       Labs:   Recent Labs   Lab 06/29/24  1347 07/01/24  1425 07/04/24  0431   RBC 3.35* 3.25* 2.95*   HGB 10.0* 9.8* 9.1*   HCT 30.8* 29.6* 26.4*   MCV 91.9 91.1 89.5   MCH 29.9 30.2 30.8   MCHC 32.5 33.1 34.5   RDW 12.8 12.7 13.2   NEPRELIM 3.06  --   --    WBC 6.8 6.7 6.9   .0 280.0 312.0     Recent Labs   Lab 06/29/24 1347 07/01/24 1425 07/04/24  0431   GLU 98 92 93   BUN 11 8* 10   CREATSERUM 0.83 0.73 0.77   EGFRCR 72 84 79   CA 8.7 8.5* 8.4*   ALB  --  3.6 3.4    141 141   K 4.6 4.2 4.4    110 108   CO2 26.0 24.0 27.0   ALKPHO  --  95 99   AST  --  48* 27   ALT  --  37 25   BILT  --  0.4 0.3   TP  --  5.8 5.6*      No results for input(s): \"PT\", \"INR\", \"PTT\" in the last 168 hours.     Imaging:  MRI lumbar spine 7/6/24  Awaiting final radiology review. Imaging reviewed. Expected postoperative changes noted.     Is this a shared or split note between Advanced Practice Provider and Physician? Yes    Alexandre Naqvi PA-C  Physician Assistant- Neurosurgery   [facility]  7/6/2024, 9:41 AM        This document was created using Dragon voice recognition software and transcription variances may occur. Please contact documenting provider for clarification of contents if needed.

## 2024-07-06 NOTE — PHYSICAL THERAPY NOTE
PHYSICAL THERAPY TREATMENT NOTE - INPATIENT     Room Number: 410/410-A       Presenting Problem: leg swelling and weakness. s/p lumbar fusion L4-5 6/24/24 at outside hospital  Co-Morbidities : R LE DVT, s/p R JEREMIAS    Problem List  Principal Problem:    Leg swelling  Active Problems:    Anemia    Weakness    Deep vein thrombosis (DVT) of proximal lower extremity, unspecified chronicity, unspecified laterality (HCC)      PHYSICAL THERAPY ASSESSMENT   Patient demonstrates good  progress this session, goals  remain in progress.    Patient continues to function near baseline with bed mobility, transfers, gait, stair negotiation, and standing prolonged periods.  Contributing factors to remaining limitations include decreased functional strength, decreased endurance/aerobic capacity, and decreased muscular endurance.  Next session anticipate patient to progress bed mobility, transfers, gait, stair negotiation, and standing prolonged periods.  Physical Therapy will continue to follow patient for duration of hospitalization.    Patient continues to benefit from continued skilled PT services: at discharge to promote functional independence and safety with additional support and return home with home health PT.    PLAN  PT Treatment Plan: Bed mobility;Body mechanics;Coordination;Endurance;Patient education;Gait training;Strengthening;Stoop training;Stair training;Transfer training;Balance training  Frequency (Obs): Daily    SUBJECTIVE  Pt was agreeable to therapy session.     OBJECTIVE  Precautions: Lumbar brace    WEIGHT BEARING RESTRICTION        R Lower Extremity: Full Weight Bearing  L Lower Extremity: Full Weight Bearing    PAIN ASSESSMENT   Rating: Unable to rate  Location: BLE  Management Techniques: Activity promotion;Body mechanics;Relaxation;Repositioning    BALANCE  Static Sitting: Good  Dynamic Sitting: Fair +  Static Standing: Fair  Dynamic Standing: Fair -    ACTIVITY TOLERANCE                          O2 WALK        AM-PAC '6-Clicks' INPATIENT SHORT FORM - BASIC MOBILITY  How much difficulty does the patient currently have...  Patient Difficulty: Turning over in bed (including adjusting bedclothes, sheets and blankets)?: A Little   Patient Difficulty: Sitting down on and standing up from a chair with arms (e.g., wheelchair, bedside commode, etc.): A Little   Patient Difficulty: Moving from lying on back to sitting on the side of the bed?: A Little   How much help from another person does the patient currently need...   Help from Another: Moving to and from a bed to a chair (including a wheelchair)?: A Little   Help from Another: Need to walk in hospital room?: A Little   Help from Another: Climbing 3-5 steps with a railing?: A Little     AM-PAC Score:  Raw Score: 18   Approx Degree of Impairment: 46.58%   Standardized Score (AM-PAC Scale): 43.63   CMS Modifier (G-Code): CK    FUNCTIONAL ABILITY STATUS  Functional Mobility/Gait Assessment  Gait Assistance: Supervision  Distance (ft): 500'  Assistive Device: Rolling walker  Pattern: Shuffle  Stairs:  (-)  How Many Stairs:  (-)  Device:  (-)  Assist:  (-)  Pattern:  (-)  Ascend and Descend :  (-)  Rolling:  NT  Supine to Sit:  NT  Sit to Supine:  NT  Sit to Stand: stand-by assist    Additional information: Pt is received in the chair and was cleared for therapy session. Pt educated and reviewed and able to maintain all her spinal precautions throughout the session. Donned pt's LSO brace also while sitting EOB. Pt is SBA with sit<>stand transfers with the RW. Pt was able to AMB about 500' with the RW SBA for balance and safety. Pt with slow gait and decreased step length with narrow JOHANNE but with good balance and safety awareness. Pt performed stair training last therapy session. Returned pt back to the room and to sitting in the chair with all needs within reach. Pt is on track to dc to home once medically cleared. Reported to the RN on the status of the pt    The patient's  Approx Degree of Impairment: 46.58% has been calculated based on documentation in the [facility] '6 clicks' Inpatient Daily Activity Short Form.  Research supports that patients with this level of impairment may benefit from Home with [facility].  Final disposition will be made by interdisciplinary medical team.        Patient End of Session: Needs met;Call light within reach;RN aware of session/findings;All patient questions and concerns addressed;In bathroom - nursing staff aware    CURRENT GOALS   Goals to be met by: 7/8/24  Patient Goal Patient's self-stated goal is: return to PLOF   Goal #1 Patient is able to demonstrate supine - sit EOB @ level: supervision     Goal #1   Current Status NT received in the chair   Goal #2 Patient is able to demonstrate transfers Sit to/from Stand at assistance level: Supervision with walker - rolling     Goal #2  Current Status SBA with the RW   Goal #3 Patient is able to ambulate 50 feet with assist device: walker - rolling at assistance level: Supervision   Goal #3   Current Status 500' with the RW SBA   Goal #4 Patient will negotiate 2 stairs/one curb w/ assistive device and CGA   Goal #4   Current Status Pt negotiated 4 stairs with 1 HR CGA last therapy session. 7/5   Goal #5 Patient to demonstrate independence with home activity/exercise instructions provided to patient in preparation for discharge.   Goal #5   Current Status IN PROGRESS       Therapeutic Activity: 23 minutes

## 2024-07-06 NOTE — PLAN OF CARE
Taking Oxy IR as needed for pain. Refusing SCD's. Ambulates with stand-by assist and brace. MRI done tonight. Discharge plan pending results.  Problem: Patient Centered Care  Goal: Patient preferences are identified and integrated in the patient's plan of care  Description: Interventions:  - What would you like us to know as we care for you? Be able to ambulate  - Provide timely, complete, and accurate information to patient/family  - Incorporate patient and family knowledge, values, beliefs, and cultural backgrounds into the planning and delivery of care  - Encourage patient/family to participate in care and decision-making at the level they choose  - Honor patient and family perspectives and choices  Outcome: Progressing     Problem: PAIN - ADULT  Goal: Verbalizes/displays adequate comfort level or patient's stated pain goal  Description: INTERVENTIONS:  - Encourage pt to monitor pain and request assistance  - Assess pain using appropriate pain scale  - Administer analgesics based on type and severity of pain and evaluate response  - Implement non-pharmacological measures as appropriate and evaluate response  - Consider cultural and social influences on pain and pain management  - Manage/alleviate anxiety  - Utilize distraction and/or relaxation techniques  - Monitor for opioid side effects  - Notify MD/LIP if interventions unsuccessful or patient reports new pain  - Anticipate increased pain with activity and pre-medicate as appropriate  Outcome: Progressing     Problem: SAFETY ADULT - FALL  Goal: Free from fall injury  Description: INTERVENTIONS:  - Assess pt frequently for physical needs  - Identify cognitive and physical deficits and behaviors that affect risk of falls.  - [place] fall precautions as indicated by assessment.  - Educate pt/family on patient safety including physical limitations  - Instruct pt to call for assistance with activity based on assessment  - Modify environment to reduce risk of  injury  - Provide assistive devices as appropriate  - Consider OT/PT consult to assist with strengthening/mobility  - Encourage toileting schedule  Outcome: Progressing     Problem: RISK FOR INFECTION - ADULT  Goal: Absence of fever/infection during anticipated neutropenic period  Description: INTERVENTIONS  - Monitor WBC  - Administer growth factors as ordered  - Implement neutropenic guidelines  Outcome: Progressing     Problem: DISCHARGE PLANNING  Goal: Discharge to home or other facility with appropriate resources  Description: INTERVENTIONS:  - Identify barriers to discharge w/pt and caregiver  - Include patient/family/discharge partner in discharge planning  - Arrange for needed discharge resources and transportation as appropriate  - Identify discharge learning needs (meds, wound care, etc)  - Arrange for interpreters to assist at discharge as needed  - Consider post-discharge preferences of patient/family/discharge partner  - Complete POLST form as appropriate  - Assess patient's ability to be responsible for managing their own health  - Refer to Case Management Department for coordinating discharge planning if the patient needs post-hospital services based on physician/LIP order or complex needs related to functional status, cognitive ability or social support system  Outcome: Progressing     Problem: Altered Communication/Language Barrier  Goal: Patient/Family is able to understand and participate in their care  Description: Interventions:  - Assess communication ability and preferred communication style  - Implement communication aides and strategies  - Use visual cues when possible  - Listen attentively, be patient, do not interrupt  - Minimize distractions  - Allow time for understanding and response  - Establish method for patient to ask for assistance (call light)  - Provide an  as needed  - Communicate barriers and strategies to overcome with those who interact with patient  Outcome:  Progressing

## 2024-07-06 NOTE — PLAN OF CARE
Patient alert and oriented. Old surgical incision to lower back. Back brace in place. MRI completed, disc provided to pt.    Pt cleared by internal medicine and neurosurgery. Plan is for pt to f/u with [facility] surgeon.    Problem: Patient Centered Care  Goal: Patient preferences are identified and integrated in the patient's plan of care  Description: Interventions:  - What would you like us to know as we care for you? Be able to ambulate  - Provide timely, complete, and accurate information to patient/family  - Incorporate patient and family knowledge, values, beliefs, and cultural backgrounds into the planning and delivery of care  - Encourage patient/family to participate in care and decision-making at the level they choose  - Honor patient and family perspectives and choices  Outcome: Adequate for Discharge     Problem: PAIN - ADULT  Goal: Verbalizes/displays adequate comfort level or patient's stated pain goal  Description: INTERVENTIONS:  - Encourage pt to monitor pain and request assistance  - Assess pain using appropriate pain scale  - Administer analgesics based on type and severity of pain and evaluate response  - Implement non-pharmacological measures as appropriate and evaluate response  - Consider cultural and social influences on pain and pain management  - Manage/alleviate anxiety  - Utilize distraction and/or relaxation techniques  - Monitor for opioid side effects  - Notify MD/LIP if interventions unsuccessful or patient reports new pain  - Anticipate increased pain with activity and pre-medicate as appropriate  Outcome: Adequate for Discharge     Problem: SAFETY ADULT - FALL  Goal: Free from fall injury  Description: INTERVENTIONS:  - Assess pt frequently for physical needs  - Identify cognitive and physical deficits and behaviors that affect risk of falls.  - [place] fall precautions as indicated by assessment.  - Educate pt/family on patient safety including physical limitations  -  Instruct pt to call for assistance with activity based on assessment  - Modify environment to reduce risk of injury  - Provide assistive devices as appropriate  - Consider OT/PT consult to assist with strengthening/mobility  - Encourage toileting schedule  Outcome: Adequate for Discharge     Problem: RISK FOR INFECTION - ADULT  Goal: Absence of fever/infection during anticipated neutropenic period  Description: INTERVENTIONS  - Monitor WBC  - Administer growth factors as ordered  - Implement neutropenic guidelines  Outcome: Adequate for Discharge     Problem: DISCHARGE PLANNING  Goal: Discharge to home or other facility with appropriate resources  Description: INTERVENTIONS:  - Identify barriers to discharge w/pt and caregiver  - Include patient/family/discharge partner in discharge planning  - Arrange for needed discharge resources and transportation as appropriate  - Identify discharge learning needs (meds, wound care, etc)  - Arrange for interpreters to assist at discharge as needed  - Consider post-discharge preferences of patient/family/discharge partner  - Complete POLST form as appropriate  - Assess patient's ability to be responsible for managing their own health  - Refer to Case Management Department for coordinating discharge planning if the patient needs post-hospital services based on physician/LIP order or complex needs related to functional status, cognitive ability or social support system  Outcome: Adequate for Discharge     Problem: Altered Communication/Language Barrier  Goal: Patient/Family is able to understand and participate in their care  Description: Interventions:  - Assess communication ability and preferred communication style  - Implement communication aides and strategies  - Use visual cues when possible  - Listen attentively, be patient, do not interrupt  - Minimize distractions  - Allow time for understanding and response  - Establish method for patient to ask for assistance (call  light)  - Provide an  as needed  - Communicate barriers and strategies to overcome with those who interact with patient  Outcome: Adequate for Discharge

## 2024-07-06 NOTE — PROGRESS NOTES
[facility]  part of [facility]    Progress Note    Tess Melendez Patient Status:  Inpatient    7/15/1945 MRN [id number]   Location [facility] 4W/SW/SE Attending Clarita Cardenas MD   Hosp Day # 6 PCP MARCIA NAVARRO       Subjective:   Tess Melendez is a(n) 78 year old female weakness, leg swelling.    Objective:     Vitals:    24   BP: 120/59   Pulse: 80   Resp: 18   Temp: 97.8 °F (36.6 °C)       Intake/Output Summary (Last 24 hours) at 2024 2331  Last data filed at 2024  Gross per 24 hour   Intake 260 ml   Output 700 ml   Net -440 ml     Wt Readings from Last 2 Encounters:   24 135 lb 9.6 oz (61.5 kg)   24 130 lb 9.6 oz (59.2 kg)       General appearance:  alert, appears stated age, and cooperative  Head: Normocephalic, without obvious abnormality, atraumatic  Eyes: conjunctivae/corneas clear. PERRL, EOM's intact. Fundi benign.  Neck: no adenopathy, no carotid bruit, no JVD, supple, symmetrical, trachea midline, and thyroid not enlarged, symmetric, no tenderness/mass/nodules  Pulmonary: clear to auscultation bilaterally  Cardiovascular: S1, S2 normal, no murmur, click, rub or gallop, regular rate and rhythm  Abdominal: soft, non-tender; bowel sounds normal; no masses,  no organomegaly  Extremities: extremities normal, atraumatic, no cyanosis or edema  Pulses: 2+ and symmetric  Neurologic: Grossly normal  Genital Exam: defer exam    Current Medications:    Current Facility-Administered Medications:     furosemide (Lasix) tab 20 mg, 20 mg, Oral, QAM    gabapentin (Neurontin) cap 300 mg, 300 mg, Oral, TID    oxyCODONE immediate release tab 5 mg, 5 mg, Oral, Q6H PRN    methocarbamol (Robaxin) tab 750 mg, 750 mg, Oral, Q6H PRN    acetaminophen (Tylenol) tab 325 mg, 325 mg, Oral, Q6H PRN    Allergies  No Known Allergies    Assessment and Plan:       Leg swelling  Patient has leg swelling part of this appears to be edema status postop.  Ultrasound is  indicative of old superficial thrombosis and it is in the superficial system I do not suspect deep venous thrombosis.  For the time being we will monitor carefully avoid IV fluids will give dose of diuretic if needed.       Anemia  Anemia is likely postoperative, hematology is on consult I do not have any other source of blood loss.       Weakness  Weakness is likely multifactorial part of it is postop but I suspect neuro logical involvement postsurgery as well, preoperatively patient had been able to ambulate with some use of a walker.  Based on postoperative instructions from [facility] patient is supposed to wear a back corset and should be ambulating I have placed a PT consult back corset is at bedside nurse was instructed to call physical therapy.       Deep vein thrombosis (DVT) of proximal lower extremity, unspecified chronicity, unspecified laterality (HCC)  Patient does not have a DVT.        Acute pain: Patient will be on oxycodone acetaminophen Neurontin and methocarbamol.     DVT prophylaxis: Patient currently cannot receive anticoagulation or subcu heparin.  In light of no DVT and likely old superficial thrombosis, will place SCD boots.  Discussed with hematologist.     GI prophylaxis: Patient will be on PPI.     Patient currently does not want to follow-up with the primary surgeon at [facility] hence a second opinion has been requested from Dr. Grijalva and will await his recommendations.     July 1, 2024  Patient is more alert and interactive.  Able to transfer to the cardiac chair from bed.  Still reports leg numbness and heaviness.  Neuroexam has been unchanged since admission.  Appreciate recommendation from pain service.  Patient will undergo physical therapy.  Will discharge when stable.     07/02/24  Patient feeling better. Still unable to ambulate. Continue PT. Pain control adequate.     07/03/24  Patient has some  edema bilateral feet. Ordered one dose of lasix 20 mg in  AM.  Overall seems improving. Discussed at length home health and Aide services.   Await disposition and discharge when arrangements are made.    07/04/24  Patient still has leg edema. Received Lasix 20 mg po with good response. Has improved ambulation. PT ongoing. D/W son. Patient has arrangements for being at home with likely home health needed.    07/05/24  Patient doing much better. Plan to continue lasix for few more days.   Plan for discharge tomorrow.  Results:     Lab Results   Component Value Date    WBC 6.9 07/04/2024    HGB 9.1 (L) 07/04/2024    HCT 26.4 (L) 07/04/2024    .0 07/04/2024     07/04/2024    K 4.4 07/04/2024     07/04/2024    CO2 27.0 07/04/2024    CREATSERUM 0.77 07/04/2024    BUN 10 07/04/2024    GLU 93 07/04/2024    CA 8.4 (L) 07/04/2024    ALB 3.4 07/04/2024    ALKPHO 99 07/04/2024    BILT 0.3 07/04/2024    TP 5.6 (L) 07/04/2024    AST 27 07/04/2024    ALT 25 07/04/2024    TSH 0.900 09/28/2021     08/07/2020    DDIMER 0.35 11/02/2021    ESRML 10 07/11/2022    CRP <0.29 07/11/2022    MG 2.1 07/05/2024    PHOS 3.3 07/04/2024    TROP <0.045 08/07/2020    B12 590 06/30/2024     No results found for this visit on 06/29/24.    No results found.                FRANCIS PEREIRA MD  7/5/2024

## 2024-07-08 NOTE — PAYOR COMM NOTE
Payor: [employer] AD[employer] PPO  Subscriber #:  [id number]  Authorization Number: [id number]    Admit date: 6/29/24  Admit time:   9:24 PM  Discharge Date: 7/6/2024  6:38 PM

## 2024-07-12 ENCOUNTER — HOSPITAL ENCOUNTER (OUTPATIENT)
Facility: HOSPITAL | Age: 79
Setting detail: OBSERVATION
Discharge: NURSING, REHAB OR LONG-TERM CARE | End: 2024-07-17
Attending: EMERGENCY MEDICINE | Admitting: INTERNAL MEDICINE
Payer: MEDICARE

## 2024-07-12 DIAGNOSIS — R26.2 INABILITY TO WALK: Primary | ICD-10-CM

## 2024-07-12 LAB
ANION GAP SERPL CALC-SCNC: 7 MMOL/L (ref 0–18)
BASOPHILS # BLD AUTO: 0.02 X10(3) UL (ref 0–0.2)
BASOPHILS NFR BLD AUTO: 0.3 %
BUN BLD-MCNC: 13 MG/DL (ref 9–23)
BUN/CREAT SERPL: 13.8 (ref 10–20)
CALCIUM BLD-MCNC: 9.2 MG/DL (ref 8.7–10.4)
CHLORIDE SERPL-SCNC: 106 MMOL/L (ref 98–112)
CO2 SERPL-SCNC: 26 MMOL/L (ref 21–32)
CREAT BLD-MCNC: 0.94 MG/DL
DEPRECATED RDW RBC AUTO: 41.2 FL (ref 35.1–46.3)
EGFRCR SERPLBLD CKD-EPI 2021: 62 ML/MIN/1.73M2 (ref 60–?)
EOSINOPHIL # BLD AUTO: 0.04 X10(3) UL (ref 0–0.7)
EOSINOPHIL NFR BLD AUTO: 0.6 %
ERYTHROCYTE [DISTWIDTH] IN BLOOD BY AUTOMATED COUNT: 12.6 % (ref 11–15)
GLUCOSE BLD-MCNC: 83 MG/DL (ref 70–99)
HCT VFR BLD AUTO: 34.9 %
HGB BLD-MCNC: 11.5 G/DL
IMM GRANULOCYTES # BLD AUTO: 0.02 X10(3) UL (ref 0–1)
IMM GRANULOCYTES NFR BLD: 0.3 %
LYMPHOCYTES # BLD AUTO: 3.66 X10(3) UL (ref 1–4)
LYMPHOCYTES NFR BLD AUTO: 54.7 %
MCH RBC QN AUTO: 29.8 PG (ref 26–34)
MCHC RBC AUTO-ENTMCNC: 33 G/DL (ref 31–37)
MCV RBC AUTO: 90.4 FL
MONOCYTES # BLD AUTO: 0.34 X10(3) UL (ref 0.1–1)
MONOCYTES NFR BLD AUTO: 5.1 %
NEUTROPHILS # BLD AUTO: 2.61 X10 (3) UL (ref 1.5–7.7)
NEUTROPHILS # BLD AUTO: 2.61 X10(3) UL (ref 1.5–7.7)
NEUTROPHILS NFR BLD AUTO: 39 %
OSMOLALITY SERPL CALC.SUM OF ELEC: 287 MOSM/KG (ref 275–295)
PLATELET # BLD AUTO: 354 10(3)UL (ref 150–450)
POTASSIUM SERPL-SCNC: 4.6 MMOL/L (ref 3.5–5.1)
RBC # BLD AUTO: 3.86 X10(6)UL
SODIUM SERPL-SCNC: 139 MMOL/L (ref 136–145)
WBC # BLD AUTO: 6.7 X10(3) UL (ref 4–11)

## 2024-07-12 RX ORDER — OXYCODONE HYDROCHLORIDE 5 MG/1
5 TABLET ORAL EVERY 6 HOURS PRN
Status: DISCONTINUED | OUTPATIENT
Start: 2024-07-12 | End: 2024-07-13

## 2024-07-12 RX ORDER — GABAPENTIN 300 MG/1
300 CAPSULE ORAL 3 TIMES DAILY
Status: DISCONTINUED | OUTPATIENT
Start: 2024-07-12 | End: 2024-07-17

## 2024-07-12 RX ORDER — PANTOPRAZOLE SODIUM 40 MG/1
40 TABLET, DELAYED RELEASE ORAL
Status: DISCONTINUED | OUTPATIENT
Start: 2024-07-13 | End: 2024-07-17

## 2024-07-12 RX ORDER — OXYCODONE HCL 5 MG/5 ML
5 SOLUTION, ORAL ORAL ONCE
Status: DISCONTINUED | OUTPATIENT
Start: 2024-07-12 | End: 2024-07-12

## 2024-07-12 RX ORDER — HEPARIN SODIUM 5000 [USP'U]/ML
5000 INJECTION, SOLUTION INTRAVENOUS; SUBCUTANEOUS EVERY 8 HOURS SCHEDULED
Status: DISCONTINUED | OUTPATIENT
Start: 2024-07-12 | End: 2024-07-17

## 2024-07-12 RX ORDER — ACETAMINOPHEN 325 MG/1
325 TABLET ORAL EVERY 6 HOURS PRN
Status: DISCONTINUED | OUTPATIENT
Start: 2024-07-12 | End: 2024-07-13

## 2024-07-12 NOTE — ED PROVIDER NOTES
Patient Seen in: [facility] Emergency Department    History     Chief Complaint   Patient presents with    Back Pain       HPI    78-year-old female who has a history of chronic low back pain and was recently admitted for this however ever since she was discharged approximately 1 week ago from the hospital, she has not been able to perform her ADLs.  Has had severe low back pain.  She denies any worsening of the back pain however reports that it has not been controlled with the oxycodone.  She is here with her sister who is requesting rehab placement given that patient lives alone and not able to care for herself and the only food that she eats is what the family members  bring by when they visit her    History reviewed.   Past Medical History:    Endometriosis    Pelvic kidney    left side    Scoliosis       History reviewed.   Past Surgical History:   Procedure Laterality Date    Hysterectomy      partial in her 30s for endometriosis    Lumbar spine surgery  06/24/2024    L4-5 MINIMALLY INVASIVE TRANSFORAMINAL LUMBAR INTERBODY FUSION WITH REPAIR OF DUROTOMYCDH    Total hip arthroplasty Right 01/18/2018    Right total hip arthroplasty         Medications :  Medications Prior to Admission   Medication Sig Dispense Refill Last Dose    gabapentin 300 MG Oral Cap Take 1 capsule (300 mg total) by mouth 3 (three) times daily. 90 capsule 0 7/12/2024    oxyCODONE 5 MG Oral Tab Take 1 tablet (5 mg total) by mouth every 6 (six) hours as needed. 28 tablet 0 7/12/2024    acetaminophen 325 MG Oral Tab Take 1 tablet (325 mg total) by mouth every 6 (six) hours as needed for Pain.   Past Month        Family History   Problem Relation Age of Onset    Heart Attack Mother     Cancer Brother     Diabetes Neg     Hypertension Neg        Smoking Status:   Social History     Socioeconomic History    Marital status:    Tobacco Use    Smoking status: Never    Smokeless tobacco: Never   Vaping Use    Vaping status: Never  Used   Substance and Sexual Activity    Alcohol use: No    Drug use: No       Constitutional and vital signs reviewed.      Social History and Family History elements reviewed from today, pertinent positives to the presenting problem noted.    Physical Exam     ED Triage Vitals [07/12/24 0904]   /67   Pulse 82   Resp 20   Temp 98 °F (36.7 °C)   Temp src Temporal   SpO2 97 %   O2 Device None (Room air)       All measures to prevent infection transmission during my interaction with the patient were taken. The patient was already wearing a droplet mask on my arrival to the room. Personal protective equipment was worn throughout the duration of the exam.  Handwashing was performed prior to and after the exam.  Stethoscope and any equipment used during my examination was cleaned with super sani-cloth germicidal wipes following the exam.     Physical Exam    General: NAD  Head: Normocephalic and atraumatic.  Mouth/Throat/Ears/Nose: Oropharynx is clear   Eyes: Conjunctivae and EOM are normal.   Neck: Normal range of motion. Supple.   Cardiovascular: Normal rate, regular rhythm   Respiratory/Chest: Clear and equal bilaterally. Exhibits no tenderness.  Gastrointestinal: Soft, non-tender, non-distended. Bowel sounds are normal.   Musculoskeletal:No swelling or deformity.   Neurological: Alert and appropriate. No focal deficits. AOx4  CN II-XII grossly intact  5/5 strength: Left  strength, deltoid abduction, achilles, patella  5/5 strength: Right  strength, deltoid abduction, achilles, patella   SILT to bilateral upper and lower extremities        Skin: Skin is warm and dry. No pallor.  Psychiatric: Has a normal mood and affect.      ED Course        Labs Reviewed   CBC W/ DIFFERENTIAL - Abnormal; Notable for the following components:       Result Value    HGB 11.5 (*)     HCT 34.9 (*)     All other components within normal limits   BASIC METABOLIC PANEL (8) - Normal   CBC WITH DIFFERENTIAL WITH PLATELET     Narrative:     The following orders were created for panel order CBC With Differential With Platelet.                  Procedure                               Abnormality         Status                                     ---------                               -----------         ------                                     CBC W/ DIFFERENTIAL[[id number]]          Abnormal            Final result                                                 Please view results for these tests on the individual orders.       As Interpreted by me    Imaging Results Available and Reviewed while in ED: No results found.  ED Medications Administered: Medications - No data to display      MDM     Vitals:    07/12/24 0904 07/12/24 1500 07/12/24 1542 07/12/24 1610   BP: 123/67 122/66 126/66    BP Location:   Right arm    Pulse: 82 62 67    Resp: 20 16 18    Temp: 98 °F (36.7 °C)  97.8 °F (36.6 °C)    TempSrc: Temporal  Oral    SpO2: 97% 97% 98%    Weight: 59 kg   55.5 kg     *I personally reviewed and interpreted all ED vitals.    Pulse Ox: 97%, Room air, Normal       Medical Decision Making      Differential Diagnosis/ Diagnostic Considerations: intractable low back pain, Inability to walk, unable to perform ADLs    Complicating Factors: The patient already has prior back surgery to contribute to the complexity of this ED evaluation.    I reviewed prior chart records including notes from most recent admission when patient was admitted for low back pain and also was found to have a superficial venous thrombosis for which hematology had recommended against anticoagulation.  Patient is here without neurologic deficits or indication for cross-sectional repeat imaging.  No red flags or neurologic deficits.  I did discuss with our , Justine, who attempted to facilitate rehab placement from the emergency department and had physical therapy provide an evaluation in the emergency department however ultimately patient was unable to be  placed from the emergency department requiring observation for her condition.  Admitted in stable condition.  Labs reviewed and unremarkable for acute findings on my interpretation.  Collateral information also obtained from the sister     Disposition and Plan     Clinical Impression:  1. Inability to walk        Disposition:  Admit    Follow-up:  No follow-up provider specified.    Medications Prescribed:  Current Discharge Medication List          Hospital Problems       Present on Admission  Date Reviewed: 6/18/2024            ICD-10-CM Noted POA    * (Principal) Inability to walk R26.2 7/12/2024 Unknown

## 2024-07-12 NOTE — ED QUICK NOTES
Orders for admission, patient is aware of plan and ready to go upstairs. Any questions, please call ED RN Abeba at extension [phone].     Patient Covid vaccination status: Unvaccinated     COVID Test Ordered in ED: None    COVID Suspicion at Admission: N/A    Running Infusions:  None    Mental Status/LOC at time of transport: A&Ox4/4     Other pertinent information:   CIWA score: N/A   NIH score:  N/A

## 2024-07-12 NOTE — OCCUPATIONAL THERAPY NOTE
OCCUPATIONAL THERAPY EVALUATION - INPATIENT     Room Number: 13/13  Evaluation Date: 7/12/2024  Type of Evaluation: Initial  Presenting Problem: back pain, recent spinal fusion 6/24/24    Physician Order: IP Consult to Occupational Therapy  Reason for Therapy: ADL/IADL Dysfunction and Discharge Planning    OCCUPATIONAL THERAPY ASSESSMENT   Patient is a 78 year old female admitted 7/12/2024 for back pain, recent spinal fusion 6/24/24.  Prior to admission, patient's baseline is independent with ADLs and MI for fx mobility using rollator. Receives assist for meal prep from friends and her younger sister who is retired. Patient is currently functioning below baseline with ADLs and fx mobility/tranfsers.  Patient is requiring up to min assist for ADLs as a result of the following impairments: decreased functional strength, decreased functional reach, decreased endurance, pain, impaired balance, decreased muscular endurance, difficulty maintaining precautions, and decreased safety awareness. Occupational Therapy will continue to follow for duration of hospitalization.    Patient will benefit from continued skilled OT Services to promote return to prior level of function and safety with continuous assistance and gradual rehabilitative therapy    PLAN  OT Treatment Plan: Balance activities;ADL training;Energy conservation/work simplification techniques;Functional transfer training;Endurance training;Patient/Family education;Equipment eval/education;Patient/Family training;Compensatory technique education  OT Device Recommendations: TBD    OCCUPATIONAL THERAPY MEDICAL/SOCIAL HISTORY   Problem List  Active Problems:    Inability to walk    HOME SITUATION  Type of Home: House  Home Layout: Two level; Able to live on main level  Lives With: Alone  Toilet and Equipment: Comfort height toilet  Other Equipment: Sock aid; Reacher  Drives: Yes (not since sx)  Patient Regularly Uses: Reading glasses  Use of Assistive Device(s):  Rollator    SUBJECTIVE  \"I want to go to rehab so they can help me shower.\"    OCCUPATIONAL THERAPY EXAMINATION    OBJECTIVE  Precautions: Spine  Fall Risk: -- (moderate fall risk)    PAIN ASSESSMENT  Rating: -- (not rated)  Location: back  Management Techniques: Activity promotion; Body mechanics; Repositioning    COGNITION  Orientation Level:  oriented to place, oriented to situation, and oriented to person  Memory:  decreased recall of precautions and decreased short term memory  Following Commands:  follows one step commands with repetition  Safety Judgement:  decreased awareness of need for safety    SENSATION  Light touch:  intact    RANGE OF MOTION   Upper extremity ROM is within functional limits     STRENGTH ASSESSMENT  Upper extremity strength is within functional limits     COORDINATION  Gross Motor: WFL   Fine Motor: WFL     ACTIVITIES OF DAILY LIVING ASSESSMENT  AM-PAC ‘6-Clicks’ Inpatient Daily Activity Short Form  How much help from another person does the patient currently need…  -   Putting on and taking off regular lower body clothing?: A Lot  -   Bathing (including washing, rinsing, drying)?: A Lot  -   Toileting, which includes using toilet, bedpan or urinal? : A Little  -   Putting on and taking off regular upper body clothing?: A Little  -   Taking care of personal grooming such as brushing teeth?: A Little  -   Eating meals?: None    AM-PAC Score:  Score: 17  Approx Degree of Impairment: 50.11%  Standardized Score (AM-PAC Scale): 37.26  CMS Modifier (G-Code): CK    BED MOBILITY  Supine to Sit: CGA   Sit to Supine: Min assist  Comments:  Patient benefited from repeat cues for use of log roll technique to ensure spine precaution adherence throughout bed mobility.    FUNCTIONAL TRANSFER ASSESSMENT  Sit to Stand: CGA    FUNCTIONAL MOBILITY  CGA for hallway fx mobility using rollator.    FUNCTIONAL ADL ASSESSMENT  Eating: independent (per obs)   Grooming: contact guard assist standing at sink   UB  Dressing: independent seated (per obs)   LB Dressing: min assist - able to demo figure-4 with B LE while seated at EOB to adjust sock  Toileting: supervision seated (per obs)     EDUCATION PROVIDED  Patient: Plan of Care; Role of Occupational Therapy; Discharge Recommendations; Functional Transfer Techniques; Fall Prevention; Posture/Positioning; Surgical Precautions; Energy Conservation; Compensatory ADL Techniques; Proper Body Mechanics  Patient's Response to Education: Requires Further Education; Verbalized Understanding    The patient's Approx Degree of Impairment: 50.11% has been calculated based on documentation in the [facility] '6 clicks' Inpatient Daily Activity Short Form.  Research supports that patients with this level of impairment may benefit from rehab.  Final disposition will be made by interdisciplinary medical team.     Patient End of Session: In bed;Needs met;Call light within reach;RN aware of session/findings;All patient questions and concerns addressed    OT Goals  Patients self stated goal is: none stated     Patient will complete functional transfer with SUP  Comment:     Patient will complete grooming in standing at sink with SUP  Comment:     Patient will complete LB dressing with SUP  Comment:    Patient will complete item retrieval with SUP  Comment:    Patient will verbalize 3/3 spine precautions without cues  Comment:     Goals  on: 24  Frequency: 3-5x/week    Patient Evaluation Complexity Level:   Occupational Profile/Medical History LOW - Brief history including review of medical or therapy records    Specific performance deficits impacting engagement in ADL/IADL MODERATE  3 - 5 performance deficits   Client Assessment/Performance Deficits MODERATE - Comorbidities and min to mod modifications of tasks    Clinical Decision Making LOW - Analysis of occupational profile, problem-focused assessments, limited treatment options    Overall Complexity LOW     OT Session  Time  Therapeutic Activity: 13 minutes    Elenita Chisholm OTR/L  [facility]  #[id number]

## 2024-07-12 NOTE — CM/SW NOTE
Submitted clinical via [employer] portal.  [employer] Case/Auth ID is [id number].  Final insurance authorization is pending at this time.      SW/CM assigned to the case will continue to follow auth status.      Heather Borden, DSC      Addendum:  7/12 -- Johann ID [id number], approved 7/13 to 7/19.     er

## 2024-07-12 NOTE — ED INITIAL ASSESSMENT (HPI)
Patient arrives to ER for evaluation of back pain. Patient had a spinal fusion on June 24th at [facility]. The day after discharge she was admitted here for pain management.     Patient back today for pain management and unable to care for herself.

## 2024-07-12 NOTE — CM/SW NOTE
Met with patient and sister at [facility]    Patient lives alone in a 3 story house mainly stays on first floor. Patient has a rolling walker at home. Patient states a home health nurse from [facility] came out to her house yesterday and stated she needed rehab care and more care than the Rn can provide so the Rn did not take her case.  Patient c/o weakness and pain and not able to move around her house like she normally does.    Last note in epic [facility] [place] [facility] but when I called this agency they stated the SW never confirmed with them the acceptance so they did not do a start of care.    PT OT ordered to do eval at bedside    PT OT notified    Waiting eval- PT OT recommending rehab  PASRR completed    Referrals sent in patricia for rehab  Patient has a history of [facility]  Will need insurance authorization  Obs ordered while waiting for insurance auth    Piedad Hopkins CTL, CCM, MSN    Emergency Room  [facility]  Clinical Transitions Leader  [phone]

## 2024-07-12 NOTE — PHYSICAL THERAPY NOTE
PHYSICAL THERAPY EVALUATION - INPATIENT     Room Number: 13/13  Evaluation Date: 7/12/2024  Type of Evaluation: Initial   Physician Order: PT Eval and Treat    Presenting Problem: inability to walk     Reason for Therapy: Mobility Dysfunction and Discharge Planning    PHYSICAL THERAPY ASSESSMENT   Patient is a 78 year old female admitted 7/12/2024 for inability to walk.  Prior to admission, patient's baseline is assist for ADL's and ambulation with rollator.  Patient is currently functioning below baseline with bed mobility, transfers, and gait.  Patient is requiring contact guard assist as a result of the following impairments: decreased functional strength, pain, and medical status.  Physical Therapy will continue to follow for duration of hospitalization.    Patient will benefit from continued skilled PT Services to promote return to prior level of function and safety with continuous assistance and gradual rehabilitative therapy .    PLAN  PT Treatment Plan: Bed mobility;Body mechanics;Patient education;Gait training;Stair training;Balance training;Transfer training  Rehab Potential : Good  Frequency (Obs): 3-5x/week    PHYSICAL THERAPY MEDICAL/SOCIAL HISTORY   Problem List  Active Problems:    Inability to walk    HOME SITUATION  Home Situation  Type of Home: House  Home Layout: Multi-level;Able to live on main level  Stairs to Bedroom: 0  Railing: No  Lives With: Alone (family has been helping patient with meals)  Drives: No  Patient Owned Equipment: Rollator  Patient Regularly Uses: Reading glasses;Glasses     SUBJECTIVE  \"I have my family able to make meals for me\"    PHYSICAL THERAPY EXAMINATION   OBJECTIVE  Precautions: None  Fall Risk: Standard fall risk    WEIGHT BEARING RESTRICTION  Weight Bearing Restriction: None                PAIN ASSESSMENT  Rating: Unable to rate          COGNITION  Overall Cognitive Status:  WFL - within functional limits however poor carryover of spinal precautions and log roll  technique     RANGE OF MOTION AND STRENGTH ASSESSMENT  Lower extremity ROM is within functional limits  BLE WNL  Lower extremity strength is within functional limits  BLE WNL    BALANCE  Static Sitting: Good  Dynamic Sitting: Fair +  Static Standing: Fair  Dynamic Standing: Fair -    AM-PAC '6-Clicks' INPATIENT SHORT FORM - BASIC MOBILITY  How much difficulty does the patient currently have...  Patient Difficulty: Turning over in bed (including adjusting bedclothes, sheets and blankets)?: A Little   Patient Difficulty: Sitting down on and standing up from a chair with arms (e.g., wheelchair, bedside commode, etc.): A Little   Patient Difficulty: Moving from lying on back to sitting on the side of the bed?: A Little   How much help from another person does the patient currently need...   Help from Another: Moving to and from a bed to a chair (including a wheelchair)?: A Little   Help from Another: Need to walk in hospital room?: A Little   Help from Another: Climbing 3-5 steps with a railing?: A Little     AM-PAC Score:  Raw Score: 18   Approx Degree of Impairment: 46.58%   Standardized Score (AM-PAC Scale): 43.63   CMS Modifier (G-Code): CK    FUNCTIONAL ABILITY STATUS  Functional Mobility/Gait Assessment  Gait Assistance: Contact guard assist  Distance (ft): 80ft  Assistive Device:  (rollator)  Rolling:  not tested   Supine to Sit: supervision  Sit to Supine: supervision  Sit to Stand: contact guard assist    Exercise/Education Provided:  Bed mobility  Body mechanics  Gait training  Transfer training    Skilled Therapy Provided: Patient on room air. Patient currently with SBA to CGA for mobility during the session with rollator. Patient with poor carryover during the session and was able to recall her spinal precautions or how to log roll. Patient able to ambulate about 80ft with rollator. Patient with cues for safety and needs to be re-directed occasionally. The patient's Approx Degree of Impairment: 46.58% has  been calculated based on documentation in the [facility] '6 clicks' Inpatient Basic Mobility Short Form.  Research supports that patients with this level of impairment may benefit from home with home health, however patient would benefit from rehab facility. Patient received semi-fowlers in bed, agreeable to physical therapy evaluation. Education with patient provided verbally on physical therapy plan of care, physiological benefits of out of bed mobility, and spinal precautions. Next session anticipate to progress bed mobility, transfers, and gait.    Patient history and/or personal factors that may impact the plan of care include home accessibility concerns, limited social support, and limited functional status at baseline. Based on the physical therapy examination of the noted systems and functional activity/participation limitations, the patient presentation is stable given the patient presents with surgical precautions/limitations, demonstrates worsening of previously stable condition, and demonstrates worsening of co-morbidities. Final disposition will be made by interdisciplinary medical team.    Patient End of Session: In bed;Needs met;Call light within reach;RN aware of session/findings;All patient questions and concerns addressed    CURRENT GOALS  Goals to be met by: 7/25/24  Patient Goal Patient's self-stated goal is: to go home   Goal #1 Patient is able to demonstrate supine - sit EOB @ level: supervision     Goal #1   Current Status    Goal #2 Patient is able to demonstrate transfers Sit to/from Stand at assistance level: supervision with rollator     Goal #2  Current Status    Goal #3 Patient is able to ambulate 100 feet with assist device: rollator at assistance level: supervision   Goal #3   Current Status    Goal #4    Goal #4   Current Status    Goal #5 Patient to demonstrate independence with home activity/exercise instructions provided to patient in preparation for discharge.   Goal #5   Current  Status    Goal #6    Goal #6  Current Status      Patient Evaluation Complexity Level:  History Low - no personal factors and/or co-morbidities   Examination of body systems Low -  addressing 1-2 elements   Clinical Presentation Low- Stable   Clinical Decision Making  Low Complexity     Gait Training: 10 minutes  Therapeutic Activity:  13 minutes

## 2024-07-13 ENCOUNTER — APPOINTMENT (OUTPATIENT)
Dept: MRI IMAGING | Facility: HOSPITAL | Age: 79
End: 2024-07-13
Attending: Other
Payer: MEDICARE

## 2024-07-13 LAB
BILIRUB UR QL: NEGATIVE
CLARITY UR: CLEAR
GLUCOSE UR-MCNC: NORMAL MG/DL
HGB UR QL STRIP.AUTO: NEGATIVE
KETONES UR-MCNC: NEGATIVE MG/DL
LEUKOCYTE ESTERASE UR QL STRIP.AUTO: 250
NITRITE UR QL STRIP.AUTO: NEGATIVE
PH UR: 6.5 [PH] (ref 5–8)
PROT UR-MCNC: NEGATIVE MG/DL
SP GR UR STRIP: 1.01 (ref 1–1.03)
UROBILINOGEN UR STRIP-ACNC: NORMAL

## 2024-07-13 PROCEDURE — 72146 MRI CHEST SPINE W/O DYE: CPT | Performed by: OTHER

## 2024-07-13 PROCEDURE — 72141 MRI NECK SPINE W/O DYE: CPT | Performed by: OTHER

## 2024-07-13 PROCEDURE — 99223 1ST HOSP IP/OBS HIGH 75: CPT | Performed by: OTHER

## 2024-07-13 RX ORDER — OXYCODONE HYDROCHLORIDE 5 MG/1
5 TABLET ORAL EVERY 4 HOURS PRN
Status: DISCONTINUED | OUTPATIENT
Start: 2024-07-13 | End: 2024-07-17

## 2024-07-13 RX ORDER — ACETAMINOPHEN 500 MG
500 TABLET ORAL EVERY 4 HOURS
Status: DISCONTINUED | OUTPATIENT
Start: 2024-07-13 | End: 2024-07-17

## 2024-07-13 NOTE — PLAN OF CARE
Patient is alert and oriented per baseline, vital signs stable, complaints of pain treated on current plan. Family at bedside and updated on plan of care. Fall precautions in place, call light visible and within reach.    Problem: Patient Centered Care  Goal: Patient preferences are identified and integrated in the patient's plan of care  Description: Interventions:  - What would you like us to know as we care for you? Home alone  - Provide timely, complete, and accurate information to patient/family  - Incorporate patient and family knowledge, values, beliefs, and cultural backgrounds into the planning and delivery of care  - Encourage patient/family to participate in care and decision-making at the level they choose  - Honor patient and family perspectives and choices  Outcome: Progressing     Problem: Patient/Family Goals  Goal: Patient/Family Long Term Goal  Description: Patient's Long Term Goal    Interventions:  - See additional Care Plan goals for specific interventions  Outcome: Progressing  Goal: Patient/Family Short Term Goal  Description: Patient's Short Term Goal    Interventions:   - See additional Care Plan goals for specific interventions  Outcome: Progressing     Problem: PAIN - ADULT  Goal: Verbalizes/displays adequate comfort level or patient's stated pain goal  Description: INTERVENTIONS:  - Encourage pt to monitor pain and request assistance  - Assess pain using appropriate pain scale  - Administer analgesics based on type and severity of pain and evaluate response  - Implement non-pharmacological measures as appropriate and evaluate response  - Consider cultural and social influences on pain and pain management  - Manage/alleviate anxiety  - Utilize distraction and/or relaxation techniques  - Monitor for opioid side effects  - Notify MD/LIP if interventions unsuccessful or patient reports new pain  - Anticipate increased pain with activity and pre-medicate as appropriate  Outcome: Progressing      Problem: RISK FOR INFECTION - ADULT  Goal: Absence of fever/infection during anticipated neutropenic period  Description: INTERVENTIONS  - Monitor WBC  - Administer growth factors as ordered  - Implement neutropenic guidelines  Outcome: Progressing     Problem: SAFETY ADULT - FALL  Goal: Free from fall injury  Description: INTERVENTIONS:  - Assess pt frequently for physical needs  - Identify cognitive and physical deficits and behaviors that affect risk of falls.  - [place] fall precautions as indicated by assessment.  - Educate pt/family on patient safety including physical limitations  - Instruct pt to call for assistance with activity based on assessment  - Modify environment to reduce risk of injury  - Provide assistive devices as appropriate  - Consider OT/PT consult to assist with strengthening/mobility  - Encourage toileting schedule  Outcome: Progressing     Problem: DISCHARGE PLANNING  Goal: Discharge to home or other facility with appropriate resources  Description: INTERVENTIONS:  - Identify barriers to discharge w/pt and caregiver  - Include patient/family/discharge partner in discharge planning  - Arrange for needed discharge resources and transportation as appropriate  - Identify discharge learning needs (meds, wound care, etc)  - Arrange for interpreters to assist at discharge as needed  - Consider post-discharge preferences of patient/family/discharge partner  - Complete POLST form as appropriate  - Assess patient's ability to be responsible for managing their own health  - Refer to Case Management Department for coordinating discharge planning if the patient needs post-hospital services based on physician/LIP order or complex needs related to functional status, cognitive ability or social support system  Outcome: Progressing

## 2024-07-13 NOTE — H&P
[facility]  part of [facility]    Report of Admission    Tess Melendez Patient Status:  Observation    7/15/1945 MRN [id number]   Location [facility] 5SW/SE Attending Clarita Cardenas MD   Hosp Day # 0 PCP MARCIA NAVARRO     Date of Admission:  2024  Date of Consult:  2024    Reason for Admission:   Low back pain difficulty ambulation feeling extremely fatigued and weak.    History of Present Illness:   Patient is a 78 year old female who was admitted to the hospital for Inability to walk:  Ms. Melendez is a very pleasant 78-year-old lady who was just recently discharged by me from the hospital with a few days of stay in the hospital likely from chronic back pain which was postoperative and related to extensive surgeries which are prolonged and requiring long time to recuperate from [facility].  Patient at that admission previously was noted to have no instability of her surgical site following her discharge from the hospital patient did go and meet the operating team and was given a clear and was also told that it would take many weeks prior to her getting complete relief or improvement of her back pain.  Patient now comes to the emergency room with complaints of being very weak difficulty ambulation and also more so with not having much support at home to stay alone by herself.  Patient denies any incontinence patient does report that she is ambulating very little which is a better situation than her previous admission.  Patient also reports that she is taking oxycodone but it makes her very tired.     Past Medical History  Past Medical History:    Endometriosis    Pelvic kidney    left side    Scoliosis       Past Surgical History  Past Surgical History:   Procedure Laterality Date    Hysterectomy      partial in her 30s for endometriosis    Lumbar spine surgery  2024    L4-5 MINIMALLY INVASIVE TRANSFORAMINAL LUMBAR INTERBODY FUSION WITH  REPAIR OF DUROTOMYH    Total hip arthroplasty Right 01/18/2018    Right total hip arthroplasty       Family History  Family History   Problem Relation Age of Onset    Heart Attack Mother     Cancer Brother     Diabetes Neg     Hypertension Neg        Social History  Pediatric History   Patient Parents    Not on file     Other Topics Concern    Not on file   Social History Narrative    Not on file           Current Medications:    Current Facility-Administered Medications:     heparin (Porcine) 5000 UNIT/ML injection 5,000 Units, 5,000 Units, Subcutaneous, Q8H JANET    [START ON 7/13/2024] pantoprazole (Protonix) DR tab 40 mg, 40 mg, Oral, QAM AC    oxyCODONE immediate release tab 5 mg, 5 mg, Oral, Q6H PRN    acetaminophen (Tylenol) tab 325 mg, 325 mg, Oral, Q6H PRN    gabapentin (Neurontin) cap 300 mg, 300 mg, Oral, TID  Medications Prior to Admission   Medication Sig    gabapentin 300 MG Oral Cap Take 1 capsule (300 mg total) by mouth 3 (three) times daily.    oxyCODONE 5 MG Oral Tab Take 1 tablet (5 mg total) by mouth every 6 (six) hours as needed.    acetaminophen 325 MG Oral Tab Take 1 tablet (325 mg total) by mouth every 6 (six) hours as needed for Pain.       Allergies  No Known Allergies    Review of Systems:    A comprehensive review of systems was negative except as per HPI    Physical Exam:     Vitals:    07/12/24 1542   BP: 126/66   Pulse: 67   Resp: 18   Temp: 97.8 °F (36.6 °C)       Intake/Output Summary (Last 24 hours) at 7/12/2024 2206  Last data filed at 7/12/2024 1542  Gross per 24 hour   Intake 120 ml   Output --   Net 120 ml     Wt Readings from Last 2 Encounters:   07/12/24 122 lb 6.4 oz (55.5 kg)   06/29/24 135 lb 9.6 oz (61.5 kg)       General appearance:  alert, appears stated age, and cooperative  Head: Normocephalic, without obvious abnormality, atraumatic  Eyes: conjunctivae/corneas clear. PERRL, EOM's intact. Fundi benign.  Ears: normal TM's and external ear canals both ears  Nose: Nares  normal. Septum midline. Mucosa normal. No drainage or sinus tenderness.  Throat: lips, mucosa, and tongue normal; teeth and gums normal  Neck: no adenopathy, no carotid bruit, no JVD, supple, symmetrical, trachea midline, and thyroid not enlarged, symmetric, no tenderness/mass/nodules  Pulmonary: clear to auscultation bilaterally  Cardiovascular: S1, S2 normal, no murmur, click, rub or gallop, regular rate and rhythm  Abdominal: soft, non-tender; bowel sounds normal; no masses,  no organomegaly  Extremities: extremities normal, atraumatic, no cyanosis or edema  Pulses: 2+ and symmetric  Skin: Skin color, texture, turgor normal. No rashes or lesions  Neurologic: Grossly normal  Genital Exam: defer exam    Results:     Laboratory Data:  Lab Results   Component Value Date    WBC 6.7 07/12/2024    HGB 11.5 (L) 07/12/2024    HCT 34.9 (L) 07/12/2024    .0 07/12/2024     07/12/2024    K 4.6 07/12/2024     07/12/2024    CO2 26.0 07/12/2024    CREATSERUM 0.94 07/12/2024    BUN 13 07/12/2024    GLU 83 07/12/2024    CA 9.2 07/12/2024    ALB 4.0 07/06/2024    ALKPHO 117 07/06/2024    BILT 0.4 07/06/2024    TP 6.6 07/06/2024    AST 23 07/06/2024    ALT 21 07/06/2024    TSH 0.900 09/28/2021     08/07/2020    DDIMER 0.35 11/02/2021    ESRML 10 07/11/2022    CRP <0.29 07/11/2022    MG 2.0 07/06/2024    PHOS 3.5 07/06/2024    TROP <0.045 08/07/2020    B12 590 06/30/2024     No results found for this visit on 07/12/24.    Imaging:  No results found.       Impression:          problem #1 inability to walk: Patient is postop and on oxycodone for pain relief.  In the hospital patient did well with ongoing physical therapy.  While at home patient does have nursing aides that come and home health helps her but this has been inadequate.  Patient is very motivated and could benefit from acute rehab once her pain is adequately controlled.    Problem #2 postoperative pain: Patient will be on gabapentin and oxycodone  pain service consult has been requested.    Nutrition: Patient was doing well in the hospital her situation with nutrition was likely secondary to lack of support at home.  Will add nutritional supplement and will continue on regular diet.    Urinary tract infection: Patient has an abnormal UA urine culture is pending in light of patient developing weakness which is much more than her discharge time I will treat this patient for UTI empirically and await culture results.    Edema: Patient had edema of lower extremities which was very well managed with 5 days of outpatient loop diuretic will continue managing the patient and look for swelling and treat with a loop diuretic as needed.    DVT prophylaxis: Patient currently has no active calf swelling patient is postop has limited mobility previously patient was not given heparin because she was postoperative during this admission will continue the same and will place SCD boots.    GI prophylaxis: Patient will be on PPI    Total time spent seeing patient was 70 minutes.    Recommendations:  As above.    Thank you for allowing me to participate in the care of your patient.    FRANCIS PEREIRA MD  7/12/2024

## 2024-07-13 NOTE — CONSULTS
[facility]  [address] [place] [address], [address]  [facility] [address]  [phone]            Tess Melendez Patient Status:  Observation    7/15/1945 MRN [id number]   Location [facility] 5SW/SE Attending Clarita Cardenas MD   Hosp Day # 0 PCP MARCIA NAVARRO     Date of Admission:  2024  Date of Consult:  2024  Reason for Consultation:   Inability to walk.    History of Present Illness:   Patient is a 78 year old female who was admitted to the hospital for Inability to walk:  Patient with extensive history of back pain, scoliosis, lumbar stenosis and radiculopathies.  Patient was apparently recently discharged from [facility] after prolonged stay and complicated back surgery that required incision send second approach fusion of her spine.  She progressively developed weakness in her lower extremities.  Heaviness and numbness of both lower extremities.  She presented to our hospital in 2024 with some edema of her lower extremities poorly controlled pain.  She did not want to have follow-up with primary surgeon at [facility].  She was eventually discharged from the hospital as well.  However she came back few days later with continued difficulty with ambulation.  She felt that her swelling has gone away but she was still not able to take care of her daily activities.  Prior to the surgery she required a cane but essentially never a walker.  She denies any urinary incontinence but due to poor problems of vision she had hard time getting to the bathroom.    Past Medical History  Past Medical History:    Endometriosis    Pelvic kidney    left side    Scoliosis       Past Surgical History  Past Surgical History:   Procedure Laterality Date    Hysterectomy      partial in her 30s for endometriosis    Lumbar spine surgery  2024    L4-5 MINIMALLY INVASIVE TRANSFORAMINAL LUMBAR INTERBODY FUSION WITH REPAIR OF DUROTOMYCDH    Total hip  arthroplasty Right 01/18/2018    Right total hip arthroplasty       Family History  Family History   Problem Relation Age of Onset    Heart Attack Mother     Cancer Brother     Diabetes Neg     Hypertension Neg        Social History  Pediatric History   Patient Parents    Not on file     Other Topics Concern    Not on file   Social History Narrative    Not on file           Current Medications:  Current Facility-Administered Medications   Medication Dose Route Frequency    heparin (Porcine) 5000 UNIT/ML injection 5,000 Units  5,000 Units Subcutaneous Q8H JANET    pantoprazole (Protonix) DR tab 40 mg  40 mg Oral QAM AC    oxyCODONE immediate release tab 5 mg  5 mg Oral Q6H PRN    acetaminophen (Tylenol) tab 325 mg  325 mg Oral Q6H PRN    gabapentin (Neurontin) cap 300 mg  300 mg Oral TID     Medications Prior to Admission   Medication Sig    gabapentin 300 MG Oral Cap Take 1 capsule (300 mg total) by mouth 3 (three) times daily.    oxyCODONE 5 MG Oral Tab Take 1 tablet (5 mg total) by mouth every 6 (six) hours as needed.    acetaminophen 325 MG Oral Tab Take 1 tablet (325 mg total) by mouth every 6 (six) hours as needed for Pain.       Allergies  No Known Allergies    Review of Systems:   As in HPI, the rest of the 14 system review was done and was negative    Physical Exam:     Vitals:    07/12/24 1542 07/12/24 1610 07/12/24 2214 07/13/24 0651   BP: 126/66  122/67 116/62   Pulse: 67  64 55   Resp: 18  18 18   Temp: 97.8 °F (36.6 °C)  98.7 °F (37.1 °C) 98.2 °F (36.8 °C)   TempSrc: Oral  Oral Oral   SpO2: 98%  97% 98%   Weight:  122 lb 6.4 oz (55.5 kg)         General: No apparent distress, well nourished, well groomed.  Head- Normocephalic, atraumatic  Eyes- No redness or swelling  ENT- Hearing intake, smell preserved, normal glutition  Neck- No masses or adenopathy  Cv: pulses were palpable and normal, no cyanosis or edema     Neurological:     Mental Status- Alert and oriented x3.  Normal attention span and  concentration  Thought process intact  Memory intact- recent and remote  Mood intact  Fund of knowledge appropriate for education and age    Language intact including: comprehension, naming, repetition, vocabulary    Cranial Nerves:  V. Facial sensation intact  VII. Face symmetric, no facial weakness  VIII. Hearing intact.  IX. Pallet elevates symmetrically.  XI. Shoulder shrug is intact  XII. Tongue is midline    Motor Exam:  Muscle tone normal  No atrophy or fasciculations  Strength- upper extremities 5/5 proximally and distally                  - lower  extremities 4+/5 proximally and distally    Sensory Exam:  Light touch sensation- intact in all 4 extremities    Deep Tendon Reflexes:  Biceps 2+ bilateral symmetric  Triceps 2+ bilateral symmetric  Brachioradialis 2 + bilateral symmetric  Patellar brisker on the left side  Ankle jerk 1+ bilateral symmetric    No clonus  No Babinski sign    Coordination:  Finger to nose intact  Rapid alternating movements intact      Results:     Laboratory Data:  Lab Results   Component Value Date    WBC 6.7 07/12/2024    HGB 11.5 (L) 07/12/2024    HCT 34.9 (L) 07/12/2024    .0 07/12/2024    CREATSERUM 0.94 07/12/2024    BUN 13 07/12/2024     07/12/2024    K 4.6 07/12/2024     07/12/2024    CO2 26.0 07/12/2024    GLU 83 07/12/2024    CA 9.2 07/12/2024    ALB 4.0 07/06/2024    ALKPHO 117 07/06/2024    TP 6.6 07/06/2024    AST 23 07/06/2024    ALT 21 07/06/2024    TSH 0.900 09/28/2021     08/07/2020    DDIMER 0.35 11/02/2021    ESRML 10 07/11/2022    CRP <0.29 07/11/2022    MG 2.0 07/06/2024    PHOS 3.5 07/06/2024    TROP <0.045 08/07/2020    B12 590 06/30/2024         Imaging:    No results found.      MRI lumbar spine was independently reviewed, some degenerative changes noted.  Postsurgical changes noted.    Impression:     Inability to walk  Most likely due to poorly controlled pain and lack of rehabilitation.  But considering her extensive  osteoarthritis picture, we will need to recheck her MRI of cervical and thoracic spine to rule out any other cervical cord compression causing the weakness.      Thank you for allowing me to participate in the care of your patient.    Yazan Centeon MD  7/13/2024

## 2024-07-13 NOTE — PROGRESS NOTES
This rn attempted to contact Dr. Cardenas 3 times for orders via phone call, bubble chat, and perfect serve,  but have not heard back. Perfect serve is currently down and [facility] is working onresolving  the issue,

## 2024-07-13 NOTE — PLAN OF CARE
No acute changes. Pain medication administered as needed for back pain.   Fall precautions in place. Call light in reach.     Problem: Patient Centered Care  Goal: Patient preferences are identified and integrated in the patient's plan of care  Description: Interventions:  - What would you like us to know as we care for you? \"Unable to care for self\"  - Provide timely, complete, and accurate information to patient/family  - Incorporate patient and family knowledge, values, beliefs, and cultural backgrounds into the planning and delivery of care  - Encourage patient/family to participate in care and decision-making at the level they choose  - Honor patient and family perspectives and choices  Outcome: Progressing     Problem: Patient/Family Goals  Goal: Patient/Family Long Term Goal  Description: Patient's Long Term Goal: perform ADL's    Interventions:  - allow patient to do for self as tolerated  - See additional Care Plan goals for specific interventions  Outcome: Progressing  Goal: Patient/Family Short Term Goal  Description: Patient's Short Term Goal: free of pain    Interventions:   - pain medications   - See additional Care Plan goals for specific interventions  Outcome: Progressing     Problem: PAIN - ADULT  Goal: Verbalizes/displays adequate comfort level or patient's stated pain goal  Description: INTERVENTIONS:  - Encourage pt to monitor pain and request assistance  - Assess pain using appropriate pain scale  - Administer analgesics based on type and severity of pain and evaluate response  - Implement non-pharmacological measures as appropriate and evaluate response  - Consider cultural and social influences on pain and pain management  - Manage/alleviate anxiety  - Utilize distraction and/or relaxation techniques  - Monitor for opioid side effects  - Notify MD/LIP if interventions unsuccessful or patient reports new pain  - Anticipate increased pain with activity and pre-medicate as appropriate  Outcome:  Progressing     Problem: RISK FOR INFECTION - ADULT  Goal: Absence of fever/infection during anticipated neutropenic period  Description: INTERVENTIONS  - Monitor WBC  - Administer growth factors as ordered  - Implement neutropenic guidelines  Outcome: Progressing     Problem: SAFETY ADULT - FALL  Goal: Free from fall injury  Description: INTERVENTIONS:  - Assess pt frequently for physical needs  - Identify cognitive and physical deficits and behaviors that affect risk of falls.  - [place] fall precautions as indicated by assessment.  - Educate pt/family on patient safety including physical limitations  - Instruct pt to call for assistance with activity based on assessment  - Modify environment to reduce risk of injury  - Provide assistive devices as appropriate  - Consider OT/PT consult to assist with strengthening/mobility  - Encourage toileting schedule  Outcome: Progressing     Problem: DISCHARGE PLANNING  Goal: Discharge to home or other facility with appropriate resources  Description: INTERVENTIONS:  - Identify barriers to discharge w/pt and caregiver  - Include patient/family/discharge partner in discharge planning  - Arrange for needed discharge resources and transportation as appropriate  - Identify discharge learning needs (meds, wound care, etc)  - Arrange for interpreters to assist at discharge as needed  - Consider post-discharge preferences of patient/family/discharge partner  - Complete POLST form as appropriate  - Assess patient's ability to be responsible for managing their own health  - Refer to Case Management Department for coordinating discharge planning if the patient needs post-hospital services based on physician/LIP order or complex needs related to functional status, cognitive ability or social support system  Outcome: Progressing
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
## 2024-07-14 LAB
ALBUMIN SERPL-MCNC: 4 G/DL (ref 3.2–4.8)
ALBUMIN/GLOB SERPL: 1.5 {RATIO} (ref 1–2)
ALP LIVER SERPL-CCNC: 118 U/L
ALT SERPL-CCNC: 9 U/L
ANION GAP SERPL CALC-SCNC: 7 MMOL/L (ref 0–18)
AST SERPL-CCNC: 21 U/L (ref ?–34)
BASOPHILS # BLD AUTO: 0.02 X10(3) UL (ref 0–0.2)
BASOPHILS NFR BLD AUTO: 0.4 %
BILIRUB SERPL-MCNC: 0.4 MG/DL (ref 0.2–1.1)
BUN BLD-MCNC: 10 MG/DL (ref 9–23)
BUN/CREAT SERPL: 11.8 (ref 10–20)
CALCIUM BLD-MCNC: 9.3 MG/DL (ref 8.7–10.4)
CHLORIDE SERPL-SCNC: 108 MMOL/L (ref 98–112)
CO2 SERPL-SCNC: 26 MMOL/L (ref 21–32)
CREAT BLD-MCNC: 0.85 MG/DL
DEPRECATED RDW RBC AUTO: 42.6 FL (ref 35.1–46.3)
EGFRCR SERPLBLD CKD-EPI 2021: 70 ML/MIN/1.73M2 (ref 60–?)
EOSINOPHIL # BLD AUTO: 0.12 X10(3) UL (ref 0–0.7)
EOSINOPHIL NFR BLD AUTO: 2.1 %
ERYTHROCYTE [DISTWIDTH] IN BLOOD BY AUTOMATED COUNT: 12.5 % (ref 11–15)
GLOBULIN PLAS-MCNC: 2.7 G/DL (ref 2–3.5)
GLUCOSE BLD-MCNC: 90 MG/DL (ref 70–99)
HCT VFR BLD AUTO: 36.4 %
HGB BLD-MCNC: 11.4 G/DL
IMM GRANULOCYTES # BLD AUTO: 0.01 X10(3) UL (ref 0–1)
IMM GRANULOCYTES NFR BLD: 0.2 %
LYMPHOCYTES # BLD AUTO: 3.74 X10(3) UL (ref 1–4)
LYMPHOCYTES NFR BLD AUTO: 66.3 %
MAGNESIUM SERPL-MCNC: 2.1 MG/DL (ref 1.6–2.6)
MCH RBC QN AUTO: 29.2 PG (ref 26–34)
MCHC RBC AUTO-ENTMCNC: 31.3 G/DL (ref 31–37)
MCV RBC AUTO: 93.3 FL
MONOCYTES # BLD AUTO: 0.24 X10(3) UL (ref 0.1–1)
MONOCYTES NFR BLD AUTO: 4.3 %
NEUTROPHILS # BLD AUTO: 1.51 X10 (3) UL (ref 1.5–7.7)
NEUTROPHILS # BLD AUTO: 1.51 X10(3) UL (ref 1.5–7.7)
NEUTROPHILS NFR BLD AUTO: 26.7 %
OSMOLALITY SERPL CALC.SUM OF ELEC: 291 MOSM/KG (ref 275–295)
PHOSPHATE SERPL-MCNC: 3.3 MG/DL (ref 2.4–5.1)
PLATELET # BLD AUTO: 329 10(3)UL (ref 150–450)
POTASSIUM SERPL-SCNC: 4.6 MMOL/L (ref 3.5–5.1)
PROT SERPL-MCNC: 6.7 G/DL (ref 5.7–8.2)
RBC # BLD AUTO: 3.9 X10(6)UL
SODIUM SERPL-SCNC: 141 MMOL/L (ref 136–145)
WBC # BLD AUTO: 5.6 X10(3) UL (ref 4–11)

## 2024-07-14 PROCEDURE — 99232 SBSQ HOSP IP/OBS MODERATE 35: CPT | Performed by: OTHER

## 2024-07-14 RX ORDER — POLYETHYLENE GLYCOL 3350 17 G/17G
17 POWDER, FOR SOLUTION ORAL DAILY PRN
Status: DISCONTINUED | OUTPATIENT
Start: 2024-07-14 | End: 2024-07-17

## 2024-07-14 RX ORDER — SODIUM PHOSPHATE, DIBASIC AND SODIUM PHOSPHATE, MONOBASIC 7; 19 G/230ML; G/230ML
1 ENEMA RECTAL ONCE AS NEEDED
Status: DISCONTINUED | OUTPATIENT
Start: 2024-07-14 | End: 2024-07-17

## 2024-07-14 RX ORDER — BISACODYL 10 MG
10 SUPPOSITORY, RECTAL RECTAL
Status: DISCONTINUED | OUTPATIENT
Start: 2024-07-14 | End: 2024-07-17

## 2024-07-14 RX ORDER — DOCUSATE SODIUM 100 MG/1
100 CAPSULE, LIQUID FILLED ORAL 2 TIMES DAILY
Status: DISCONTINUED | OUTPATIENT
Start: 2024-07-14 | End: 2024-07-17

## 2024-07-14 NOTE — PLAN OF CARE
Patient alert and oriented times 3. Patient's pain treated with pain medication. Updated on current plan of care. Patient sitting in a chair. Patient has call light within reach, fall precautions in place.     Problem: Patient Centered Care  Goal: Patient preferences are identified and integrated in the patient's plan of care  Description: Interventions:  - What would you like us to know as we care for you?   - Provide timely, complete, and accurate information to patient/family  - Incorporate patient and family knowledge, values, beliefs, and cultural backgrounds into the planning and delivery of care  - Encourage patient/family to participate in care and decision-making at the level they choose  - Honor patient and family perspectives and choices  Outcome: Progressing     Problem: Patient/Family Goals  Goal: Patient/Family Long Term Goal  Description: Patient's Long Term Goal:     Interventions:  - See additional Care Plan goals for specific interventions  Outcome: Progressing  Goal: Patient/Family Short Term Goal  Description: Patient's Short Term Goal:     Interventions:   - See additional Care Plan goals for specific interventions  Outcome: Progressing     Problem: PAIN - ADULT  Goal: Verbalizes/displays adequate comfort level or patient's stated pain goal  Description: INTERVENTIONS:  - Encourage pt to monitor pain and request assistance  - Assess pain using appropriate pain scale  - Administer analgesics based on type and severity of pain and evaluate response  - Implement non-pharmacological measures as appropriate and evaluate response  - Consider cultural and social influences on pain and pain management  - Manage/alleviate anxiety  - Utilize distraction and/or relaxation techniques  - Monitor for opioid side effects  - Notify MD/LIP if interventions unsuccessful or patient reports new pain  - Anticipate increased pain with activity and pre-medicate as appropriate  Outcome: Progressing     Problem: RISK  FOR INFECTION - ADULT  Goal: Absence of fever/infection during anticipated neutropenic period  Description: INTERVENTIONS  - Monitor WBC  - Administer growth factors as ordered  - Implement neutropenic guidelines  Outcome: Progressing     Problem: SAFETY ADULT - FALL  Goal: Free from fall injury  Description: INTERVENTIONS:  - Assess pt frequently for physical needs  - Identify cognitive and physical deficits and behaviors that affect risk of falls.  - [place] fall precautions as indicated by assessment.  - Educate pt/family on patient safety including physical limitations  - Instruct pt to call for assistance with activity based on assessment  - Modify environment to reduce risk of injury  - Provide assistive devices as appropriate  - Consider OT/PT consult to assist with strengthening/mobility  - Encourage toileting schedule  Outcome: Progressing     Problem: DISCHARGE PLANNING  Goal: Discharge to home or other facility with appropriate resources  Description: INTERVENTIONS:  - Identify barriers to discharge w/pt and caregiver  - Include patient/family/discharge partner in discharge planning  - Arrange for needed discharge resources and transportation as appropriate  - Identify discharge learning needs (meds, wound care, etc)  - Arrange for interpreters to assist at discharge as needed  - Consider post-discharge preferences of patient/family/discharge partner  - Complete POLST form as appropriate  - Assess patient's ability to be responsible for managing their own health  - Refer to Case Management Department for coordinating discharge planning if the patient needs post-hospital services based on physician/LIP order or complex needs related to functional status, cognitive ability or social support system  Outcome: Progressing

## 2024-07-14 NOTE — PROGRESS NOTES
[facility]  part of [facility]    Progress Note    Tess Melendez Patient Status:  Observation    7/15/1945 MRN [id number]   Location [facility] 5SW/SE Attending Clarita Cardenas MD   Hosp Day # 0 PCP MARCIA NAVARRO       Subjective:   Tess Melendez is a(n) 78 year old female inability to walk s/p back surgery.    Objective:     Vitals:    24 1631   BP: 127/64   Pulse: 73   Resp: 16   Temp: 98.4 °F (36.9 °C)       Intake/Output Summary (Last 24 hours) at 2024 1819  Last data filed at 2024 1400  Gross per 24 hour   Intake 300 ml   Output 450 ml   Net -150 ml     Wt Readings from Last 2 Encounters:   24 122 lb 6.4 oz (55.5 kg)   24 122 lb 5.7 oz (55.5 kg)       General appearance:  alert, appears stated age, and cooperative  Head: Normocephalic, without obvious abnormality, atraumatic  Eyes: conjunctivae/corneas clear. PERRL, EOM's intact. Fundi benign.  Neck: no adenopathy, no carotid bruit, no JVD, supple, symmetrical, trachea midline, and thyroid not enlarged, symmetric, no tenderness/mass/nodules  Pulmonary: clear to auscultation bilaterally  Cardiovascular: S1, S2 normal, no murmur, click, rub or gallop, regular rate and rhythm  Abdominal: soft, non-tender; bowel sounds normal; no masses,  no organomegaly  Extremities: extremities normal, atraumatic, no cyanosis or edema  Pulses: 2+ and symmetric  Neurologic: Grossly normal  Genital Exam: defer exam    Current Medications:    Current Facility-Administered Medications:     acetaminophen (Tylenol Extra Strength) tab 500 mg, 500 mg, Oral, q4h    oxyCODONE immediate release tab 5 mg, 5 mg, Oral, Q4H PRN    cefTRIAXone (Rocephin) 1 g in sodium chloride 0.9% 100 mL IVPB-ADDV, 1 g, Intravenous, Q24H    heparin (Porcine) 5000 UNIT/ML injection 5,000 Units, 5,000 Units, Subcutaneous, Q8H JANET    pantoprazole (Protonix) DR tab 40 mg, 40 mg, Oral, QAM AC    gabapentin (Neurontin) cap 300 mg, 300 mg, Oral,  TID    Allergies  No Known Allergies    Assessment and Plan:     problem #1 inability to walk: Patient is postop and on oxycodone for pain relief.  In the hospital patient did well with ongoing physical therapy.  While at home patient does have nursing aides that come and home health helps her but this has been inadequate.  Patient is very motivated and could benefit from acute rehab once her pain is adequately controlled.     Problem #2 postoperative pain: Patient will be on gabapentin and oxycodone pain service consult has been requested.     Nutrition: Patient was doing well in the hospital her situation with nutrition was likely secondary to lack of support at home.  Will add nutritional supplement and will continue on regular diet.     Urinary tract infection: Patient has an abnormal UA urine culture is pending in light of patient developing weakness which is much more than her discharge time I will treat this patient for UTI empirically and await culture results.     Edema: Patient had edema of lower extremities which was very well managed with 5 days of outpatient loop diuretic will continue managing the patient and look for swelling and treat with a loop diuretic as needed.     DVT prophylaxis: Patient currently has no active calf swelling patient is postop has limited mobility previously patient was not given heparin because she was postoperative during this admission will continue the same and will place SCD boots.     GI prophylaxis: Patient will be on PPI.     July 13, 2024  Patient seems to be stable urine analysis is suggestive of UTI.  Started on empiric ceftriaxone urine culture is pending  Urology consult noted further workup including MRI is pending.  Pain service recommendations noted patient will continue on oral gabapentin and oxycodone.  OT and PT is ordered and pending evaluation.    July 14, 2024  Patient is stable reports less back pain but does report tingling in both her feet also reports  tingling in her back.  No open wound or ulcer is noted.  Discussed at length with patient to continue taking gabapentin consistently and take oxycodone when needed for pain.  In addition patient will also be evaluated by Occupational Therapy and physical therapy plan is most likely to send to subacute rehab.  Discussed with Dr. Centeno the MRI findings.  Patient does not have any other etiology explaining her back pain and weakness.  OT PT will be ongoing.  Continue IV antibiotics for the time being.  Appreciate pain service recommendations.  Discharge when patient is stable and able to go to subacute rehab.      Results:       Lab Results   Component Value Date    WBC 5.6 07/14/2024    HGB 11.4 (L) 07/14/2024    HCT 36.4 07/14/2024    .0 07/14/2024     07/14/2024    K 4.6 07/14/2024     07/14/2024    CO2 26.0 07/14/2024    CREATSERUM 0.85 07/14/2024    BUN 10 07/14/2024    GLU 90 07/14/2024    CA 9.3 07/14/2024    ALB 4.0 07/14/2024    ALKPHO 118 07/14/2024    BILT 0.4 07/14/2024    TP 6.7 07/14/2024    AST 21 07/14/2024    ALT 9 (L) 07/14/2024    TSH 0.900 09/28/2021     08/07/2020    DDIMER 0.35 11/02/2021    ESRML 10 07/11/2022    CRP <0.29 07/11/2022    MG 2.1 07/14/2024    PHOS 3.3 07/14/2024    TROP <0.045 08/07/2020    B12 590 06/30/2024     Hospital Encounter on 07/12/24   1. Urine Culture, Routine     Status: None    Collection Time: 07/13/24 12:12 PM    Specimen: Urine, clean catch   Result Value Ref Range    Urine Culture <10,000 CFU/ML Gram negative antoni N/A       MRI SPINE THORACIC (CPT=72146)    Result Date: 7/13/2024  CONCLUSION:   Limited exam secondary to scoliosis.  1. No acute fracture. 2. No definitive cord signal abnormality within the limitations. 3. Multilevel degenerative changes of the thoracic spine, which results in mild multifocal foraminal narrowing and mild to moderate canal stenosis at T12-L1. 4. Severe reverse S-shaped scoliosis. 5. 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  The osseous structures are better evaluated on the prior CT thoracic spine. 6. No significant change in the mild to moderate right hydronephrosis. 7. Lesser incidental findings described above.    Dictated by (CST): Jeramie Madera MD on 7/13/2024 at 10:27 PM     Finalized by (CST): Jeramie Madera MD on 7/13/2024 at 10:52 PM          MRI SPINE CERVICAL (CPT=72141)    Result Date: 7/13/2024  CONCLUSION:   No acute fracture or traumatic listhesis of the cervical spine.  Normal caliber and signal intensity of the cervical spinal cord.  Interval progression of the multilevel degenerative changes of the upper cervical spine with moderate to severe right foraminal narrowing at C2-C3.  The remaining levels are described in detail above.  No significant change in the levocurvature of the lumbar spine or the trace anterolisthesis of C4 on C5.  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    Dictated by (CST): Jeramie Madera MD on 7/13/2024 at 10:11 PM     Finalized by (CST): Jeramie Madera MD on 7/13/2024 at 10:24 PM                       FRANCIS PEREIRA MD  7/14/2024

## 2024-07-14 NOTE — PROGRESS NOTES
[facility]  part of [facility]    Progress Note    Tess Melendez Patient Status:  Observation    7/15/1945 MRN [id number]   Location [facility] 5SW/SE Attending Clarita Cardenas MD   Hosp Day # 0 PCP MARCIA NAVARRO       Subjective:   Tess Melendez is a(n) 78 year old   Back pain and weakness inability to walk.    Objective:     Vitals:    24   BP: 118/80   Pulse: 72   Resp: 18   Temp: 98.6 °F (37 °C)       Intake/Output Summary (Last 24 hours) at 2024  Last data filed at 2024  Gross per 24 hour   Intake 440 ml   Output 0 ml   Net 440 ml     Wt Readings from Last 2 Encounters:   24 122 lb 6.4 oz (55.5 kg)   24 122 lb 5.7 oz (55.5 kg)       General appearance:  alert, appears stated age, and cooperative  Head: Normocephalic, without obvious abnormality, atraumatic  Eyes: conjunctivae/corneas clear. PERRL, EOM's intact. Fundi benign.  Neck: no adenopathy, no carotid bruit, no JVD, supple, symmetrical, trachea midline, and thyroid not enlarged, symmetric, no tenderness/mass/nodules  Pulmonary: clear to auscultation bilaterally  Cardiovascular: S1, S2 normal, no murmur, click, rub or gallop, regular rate and rhythm  Abdominal: soft, non-tender; bowel sounds normal; no masses,  no organomegaly  Extremities: extremities normal, atraumatic, no cyanosis or edema  Pulses: 2+ and symmetric  Neurologic: Grossly normal  Genital Exam: not done.    Current Medications:    Current Facility-Administered Medications:     acetaminophen (Tylenol Extra Strength) tab 500 mg, 500 mg, Oral, q4h    oxyCODONE immediate release tab 5 mg, 5 mg, Oral, Q4H PRN    cefTRIAXone (Rocephin) 1 g in sodium chloride 0.9% 100 mL IVPB-ADDV, 1 g, Intravenous, Q24H    heparin (Porcine) 5000 UNIT/ML injection 5,000 Units, 5,000 Units, Subcutaneous, Q8H JANET    pantoprazole (Protonix) DR tab 40 mg, 40 mg, Oral, QAM AC    gabapentin (Neurontin) cap 300 mg, 300 mg, Oral,  TID    Allergies  No Known Allergies    Assessment and Plan:        problem #1 inability to walk: Patient is postop and on oxycodone for pain relief.  In the hospital patient did well with ongoing physical therapy.  While at home patient does have nursing aides that come and home health helps her but this has been inadequate.  Patient is very motivated and could benefit from acute rehab once her pain is adequately controlled.     Problem #2 postoperative pain: Patient will be on gabapentin and oxycodone pain service consult has been requested.     Nutrition: Patient was doing well in the hospital her situation with nutrition was likely secondary to lack of support at home.  Will add nutritional supplement and will continue on regular diet.     Urinary tract infection: Patient has an abnormal UA urine culture is pending in light of patient developing weakness which is much more than her discharge time I will treat this patient for UTI empirically and await culture results.     Edema: Patient had edema of lower extremities which was very well managed with 5 days of outpatient loop diuretic will continue managing the patient and look for swelling and treat with a loop diuretic as needed.     DVT prophylaxis: Patient currently has no active calf swelling patient is postop has limited mobility previously patient was not given heparin because she was postoperative during this admission will continue the same and will place SCD boots.     GI prophylaxis: Patient will be on PPI.    July 13, 2024  Patient seems to be stable urine analysis is suggestive of UTI.  Started on empiric ceftriaxone urine culture is pending  Urology consult noted further workup including MRI is pending.  Pain service recommendations noted patient will continue on oral gabapentin and oxycodone.  OT and PT is ordered and pending evaluation.     Results:     Lab Results   Component Value Date    WBC 6.7 07/12/2024    HGB 11.5 (L) 07/12/2024    HCT 34.9  (L) 07/12/2024    .0 07/12/2024     07/12/2024    K 4.6 07/12/2024     07/12/2024    CO2 26.0 07/12/2024    CREATSERUM 0.94 07/12/2024    BUN 13 07/12/2024    GLU 83 07/12/2024    CA 9.2 07/12/2024    ALB 4.0 07/06/2024    ALKPHO 117 07/06/2024    BILT 0.4 07/06/2024    TP 6.6 07/06/2024    AST 23 07/06/2024    ALT 21 07/06/2024    TSH 0.900 09/28/2021     08/07/2020    DDIMER 0.35 11/02/2021    ESRML 10 07/11/2022    CRP <0.29 07/11/2022    MG 2.0 07/06/2024    PHOS 3.5 07/06/2024    TROP <0.045 08/07/2020    B12 590 06/30/2024     No results found for this visit on 07/12/24.    No results found.        **Certification      PHYSICIAN Certification of Need for Inpatient Hospitalization - Initial Certification    Patient will require inpatient services that will reasonably be expected to span two midnight's based on the clinical documentation in H+P.   Based on patients current state of illness, I anticipate that, after discharge, patient will require TBD.        FRANCIS PEREIRA MD  7/13/2024

## 2024-07-14 NOTE — PROGRESS NOTES
[facility]  [address] [place] [address], [address]  [facility] [address]  [phone]            Tess Melendez Patient Status:  Observation    7/15/1945 MRN [id number]   Location [facility] 5SW/SE Attending Clarita Cardenas MD   Hosp Day # 0 PCP MARCIA NAVARRO     Subjective:  Tess Melendez is a(n) 78 year old female.    Hospital course to date:     Patient with extensive history of back pain, scoliosis, lumbar stenosis and radiculopathies.  Patient was apparently recently discharged from [facility] after prolonged stay and complicated back surgery that required incision send second approach fusion of her spine.  She progressively developed weakness in her lower extremities.  Heaviness and numbness of both lower extremities.  She presented to our hospital in 2024 with some edema of her lower extremities poorly controlled pain.  She did not want to have follow-up with original surgeon at [facility].  She was eventually discharged from the hospital as well.  However she came back few days later with continued difficulty with ambulation.  She felt that her swelling has gone away but she was still not able to take care of her daily activities.  Prior to the surgery she required a cane but essentially never a walker.  She denies any urinary incontinence but due to poor problems of ambulation she had hard time getting to the bathroom.    MRI of the cervical and thoracic spine did not show any cord abnormalities to account for her ataxia.    Current Facility-Administered Medications   Medication Dose Route Frequency    acetaminophen (Tylenol Extra Strength) tab 500 mg  500 mg Oral q4h    oxyCODONE immediate release tab 5 mg  5 mg Oral Q4H PRN    cefTRIAXone (Rocephin) 1 g in sodium chloride 0.9% 100 mL IVPB-ADDV  1 g Intravenous Q24H    heparin (Porcine) 5000 UNIT/ML injection 5,000 Units  5,000 Units Subcutaneous Q8H [facility]    pantoprazole (Protonix) DR tab  40 mg  40 mg Oral QAM AC    gabapentin (Neurontin) cap 300 mg  300 mg Oral TID       Objective:  Blood pressure 129/58, pulse 58, temperature 98.4 °F (36.9 °C), temperature source Oral, resp. rate 18, weight 122 lb 6.4 oz (55.5 kg), SpO2 98%.    Physical Exam:  Vitals:    07/13/24 1547 07/13/24 2021 07/14/24 0456 07/14/24 0945   BP: 110/60 118/80 140/68 129/58   Pulse: 66 72 58 58   Resp: 18 18 18 18   Temp: 98.6 °F (37 °C) 98.6 °F (37 °C) 98.2 °F (36.8 °C) 98.4 °F (36.9 °C)   TempSrc: Oral Oral Oral Oral   SpO2: 96% 100% 98%    Weight:           General: No apparent distress, well nourished, well groomed.  Head- Normocephalic, atraumatic  Eyes- No redness or swelling  Neck- No masses or adenopathy  CV: pulses were palpable and normal, no cyanosis or edema     Neurological:     Mental Status- Alert and oriented x3.  Normal attention span and concentration  Thought process intact  Memory intact- recent and remote  Mood intact  Fund of knowledge appropriate for education and age    Language intact including: comprehension, naming, repetition, vocabulary    Cranial Nerves:  VII. Face symmetric, no facial weakness  VIII. Hearing intact to whisper, tuning fork or finger rub.  IX. Pallet elevates symmetrically.  XI. Shoulder shrug is intact  XII. Tongue is midline    Motor Exam:  Muscle tone normal  No atrophy or fasciculations  Strength- upper extremities 5/5 proximally and distally                  - lower  extremities 4+/5 proximally and distally     Sensory Exam:  Light touch sensation- intact in all 4 extremities     Deep Tendon Reflexes:  Biceps 2+ bilateral symmetric  Triceps 2+ bilateral symmetric  Brachioradialis 2 + bilateral symmetric  Patellar brisker on the left side  Ankle jerk 1+ bilateral symmetric     No clonus  No Babinski sign     Coordination:  Finger to nose intact  Rapid alternating movements intact      Lab Results   Component Value Date    WBC 5.6 07/14/2024    HGB 11.4 (L) 07/14/2024    HCT 36.4  07/14/2024    .0 07/14/2024    CREATSERUM 0.85 07/14/2024    BUN 10 07/14/2024     07/14/2024    K 4.6 07/14/2024     07/14/2024    CO2 26.0 07/14/2024    GLU 90 07/14/2024    CA 9.3 07/14/2024    ALB 4.0 07/14/2024    ALKPHO 118 07/14/2024    BILT 0.4 07/14/2024    TP 6.7 07/14/2024    AST 21 07/14/2024    ALT 9 (L) 07/14/2024    TSH 0.900 09/28/2021     08/07/2020    DDIMER 0.35 11/02/2021    ESRML 10 07/11/2022    CRP <0.29 07/11/2022    MG 2.1 07/14/2024    PHOS 3.3 07/14/2024    TROP <0.045 08/07/2020    B12 590 06/30/2024       MRI SPINE THORACIC (CPT=72146)    Result Date: 7/13/2024  CONCLUSION:   Limited exam secondary to scoliosis.  1. No acute fracture. 2. No definitive cord signal abnormality within the limitations. 3. Multilevel degenerative changes of the thoracic spine, which results in mild multifocal foraminal narrowing and mild to moderate canal stenosis at T12-L1. 4. Severe reverse S-shaped scoliosis. 5. 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  The osseous structures are better evaluated on the prior CT thoracic spine. 6. No significant change in the mild to moderate right hydronephrosis. 7. Lesser incidental findings described above.    Dictated by (CST): Jeramie Madera MD on 7/13/2024 at 10:27 PM     Finalized by (CST): Jeramie Madera MD on 7/13/2024 at 10:52 PM          MRI SPINE CERVICAL (CPT=72141)    Result Date: 7/13/2024  CONCLUSION:   No acute fracture or traumatic listhesis of the cervical spine.  Normal caliber and signal intensity of the cervical spinal cord.  Interval progression of the multilevel degenerative changes of the upper cervical spine with moderate to severe right foraminal narrowing at C2-C3.  The remaining levels are described in detail above.  No significant change in the levocurvature of the lumbar spine or the trace anterolisthesis of C4 on C5.  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    Dictated by (CST): Jeramie Madera MD on 7/13/2024 at 10:11 PM     Finalized by (CST): Jeramie Madera MD on 7/13/2024 at 10:24 PM                 Assessment:  Patient Active Problem List   Diagnosis    Osteoarthritis of right hip    Degenerative joint disease    Foraminal stenosis of lumbar region    Scoliosis    Vitamin D deficiency    Age-related osteoporosis with current pathological fracture    Common peroneal neuropathy of left lower extremity    Lumbar radiculopathy    Anemia    Inability to walk     Patient with inability to walk, but feels preserved strength in lower extremities.  Most likely she requires debilitation and pain control.  MRI of the cervical and thoracic spine did not reveal any other etiology.  No additional neurologic recommendations at this time.    Yazan Centeno MD  7/14/2024  11:10 AM

## 2024-07-14 NOTE — PLAN OF CARE
No acute changes. Fall precautions in place. Call light in reach.     Problem: Patient Centered Care  Goal: Patient preferences are identified and integrated in the patient's plan of care  Description: Interventions:  - What would you like us to know as we care for you? None stated  - Provide timely, complete, and accurate information to patient/family  - Incorporate patient and family knowledge, values, beliefs, and cultural backgrounds into the planning and delivery of care  - Encourage patient/family to participate in care and decision-making at the level they choose  - Honor patient and family perspectives and choices  Outcome: Progressing     Problem: Patient/Family Goals  Goal: Patient/Family Long Term Goal  Description: Patient's Long Term Goal: free of pain    Interventions:  - administer pain medication as scheduled  - See additional Care Plan goals for specific interventions  Outcome: Progressing  Goal: Patient/Family Short Term Goal  Description: Patient's Short Term Goal: free of falls    Interventions:   - fall precautions in place.  - See additional Care Plan goals for specific interventions  Outcome: Progressing     Problem: PAIN - ADULT  Goal: Verbalizes/displays adequate comfort level or patient's stated pain goal  Description: INTERVENTIONS:  - Encourage pt to monitor pain and request assistance  - Assess pain using appropriate pain scale  - Administer analgesics based on type and severity of pain and evaluate response  - Implement non-pharmacological measures as appropriate and evaluate response  - Consider cultural and social influences on pain and pain management  - Manage/alleviate anxiety  - Utilize distraction and/or relaxation techniques  - Monitor for opioid side effects  - Notify MD/LIP if interventions unsuccessful or patient reports new pain  - Anticipate increased pain with activity and pre-medicate as appropriate  Outcome: Progressing     Problem: RISK FOR INFECTION - ADULT  Goal:  Absence of fever/infection during anticipated neutropenic period  Description: INTERVENTIONS  - Monitor WBC  - Administer growth factors as ordered  - Implement neutropenic guidelines  Outcome: Progressing     Problem: SAFETY ADULT - FALL  Goal: Free from fall injury  Description: INTERVENTIONS:  - Assess pt frequently for physical needs  - Identify cognitive and physical deficits and behaviors that affect risk of falls.  - [place] fall precautions as indicated by assessment.  - Educate pt/family on patient safety including physical limitations  - Instruct pt to call for assistance with activity based on assessment  - Modify environment to reduce risk of injury  - Provide assistive devices as appropriate  - Consider OT/PT consult to assist with strengthening/mobility  - Encourage toileting schedule  Outcome: Progressing     Problem: DISCHARGE PLANNING  Goal: Discharge to home or other facility with appropriate resources  Description: INTERVENTIONS:  - Identify barriers to discharge w/pt and caregiver  - Include patient/family/discharge partner in discharge planning  - Arrange for needed discharge resources and transportation as appropriate  - Identify discharge learning needs (meds, wound care, etc)  - Arrange for interpreters to assist at discharge as needed  - Consider post-discharge preferences of patient/family/discharge partner  - Complete POLST form as appropriate  - Assess patient's ability to be responsible for managing their own health  - Refer to Case Management Department for coordinating discharge planning if the patient needs post-hospital services based on physician/LIP order or complex needs related to functional status, cognitive ability or social support system  Outcome: Progressing

## 2024-07-14 NOTE — CHRONIC PAIN
[facility]  Anesthesiology Pain Management Progress Note      Patient name: Tess Melendez 78 year old female  : 7/15/1945  MRN: [id number]    Reason for Consult: Low back pain difficulty ambulating    Current hospital day: Hospital Day: 3    Pain Scores: 7    Subjective: Still in pain but doing better.  Medicines are helping but reluctant to take oxycodone.  Presently using gabapentin 300 mg 3 times per day, Tylenol 500 mg every 4 hours.  \"Best works oxycodone but does not want to take it \"    History:  Past Medical History:    Endometriosis    Pelvic kidney    left side    Scoliosis     Past Surgical History:   Procedure Laterality Date    Hysterectomy      partial in her 30s for endometriosis    Lumbar spine surgery  2024    L4-5 MINIMALLY INVASIVE TRANSFORAMINAL LUMBAR INTERBODY FUSION WITH REPAIR OF DUROTOMYCDH    Total hip arthroplasty Right 2018    Right total hip arthroplasty     Family History   Problem Relation Age of Onset    Heart Attack Mother     Cancer Brother     Diabetes Neg     Hypertension Neg       reports that she has never smoked. She has never used smokeless tobacco. She reports that she does not drink alcohol and does not use drugs.    Allergies:  No Known Allergies    Current Medications:  Scheduled Meds:   acetaminophen  500 mg Oral q4h    cefTRIAXone  1 g Intravenous Q24H    heparin  5,000 Units Subcutaneous Q8H JANET    pantoprazole  40 mg Oral QAM AC    gabapentin  300 mg Oral TID     Continuous Infusions:  PRN Meds:.  oxyCODONE    PHYSICAL EXAMINATION:  Vitals:    24 0945   BP: 129/58   Pulse: 58   Resp: 18   Temp: 98.4 °F (36.9 °C)      General: Alert and oriented x3, NAD, appears stated age, appropriate disposition and demeanor, answers questions appropriately comfortable sitting in bed.  Head: normocephalic, atraumatic  Eyes: anicteric; no injection  Ears: no obvious deformities noted   Nose: externally grossly within normal limits, no unusual  discharge or rhinorrhea   Throat: lips grossly within normal limits by visual exam externally  Neck: supple, trachea midline, no obvious JVD  Moving extremities without difficulties.  Imaging:     MRI of cervical spine  CERVICAL DISC LEVELS:     There is multilevel disc desiccation with loss of normal T2 disc signal.  There is unchanged mild-to-moderate disc height loss at C2-C3 and C3-C4.  There are redemonstrated discs at C5-6 and C6-7.  There are multiple small ventral disc osteophyte  complexes involving the upper cervical spine.  There are multilevel degenerative endplate changes involving the upper cervical spine.     C2-C3: Small posterior disc osteophyte complex with superimposed central disc protrusion.  Right greater than left uncovertebral hypertrophy.  Bilateral facet arthropathy.  Minimal canal stenosis.  Moderate to severe right foraminal narrowing.  No left  neural foraminal narrowing.  C3-C4: Small posterior disc osteophyte complex with superimposed central disc protrusion.  Bilateral uncovertebral hypertrophy.  Bilateral facet arthropathy.  Minimal canal stenosis.  Mild-to-moderate right foraminal narrowing.  No significant left  foraminal narrowing.  C4-C5: Small posterior disc osteophyte complex.  Mild bilateral uncovertebral hypertrophy.  Right greater than left facet arthropathy.  Moderate right foraminal narrowing.  No left foraminal narrowing.  No canal stenosis.  C5-C6: There is fusion with a rudimentary disc space.  No canal stenosis or foraminal narrowing.  C6-C7: There is fusion with a rudimentary disc space.  No canal stenosis or foraminal narrowing.      Impression   CONCLUSION:     No acute fracture or traumatic listhesis of the cervical spine.     Normal caliber and signal intensity of the cervical spinal cord.     Interval progression of the multilevel degenerative changes of the upper cervical spine with moderate to severe right foraminal narrowing at C2-C3.  The remaining levels are  described in detail above.     No significant change in the levocurvature of the lumbar spine or the trace anterolisthesis of C4 on C5.     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           Dictated by (CST): Jeramie Madera MD on 7/13/2024 at 10:11 PM         MRI of thoracic spine  ALIGNMENT: Severe reversed S-shaped scoliosis.  PARASPINAL AREA: No mass.  No hematoma.  There is subcutaneous edema involving the posterior lumbar soft tissues.  BONES: Congenital fusion the T1-T2 vertebral bodies.  There are vertebral segmentation anomalies at T2 and T12 as well as partial fusion at T11-T12.  No evidence of an acute fracture.  No suspicious marrow replacing lesion.  CORD: No visualized cord signal abnormality within the lower poles.  Axial T2 images through the cord were also not performed due to patient tolerance, which further limits evaluation.  DISCS: Mild multilevel degenerative change of the thoracic spine.  There is mild multifocal disc height loss.  There is left greater than right facet arthropathy involving the thoracic spine.  Mild multifocal ligamentum flavum hypertrophy.  There is mild   multilevel left foraminal narrowing.  There is a posterior disc osteophyte complex at T12-L1 with prominence of the dorsal epidural fat resulting in mild-to-moderate moderate canal stenosis (5:7).    OTHER: There is a 1.8 cm hepatic cyst.  There is unchanged mild-to-moderate right hydroureteronephrosis.  There is a left pelvic kidney.  There is a right total hip arthroplasty.       Impression   CONCLUSION:     Limited exam secondary to scoliosis.     1. No acute fracture.  2. No definitive cord signal abnormality within the limitations.  3. Multilevel degenerative changes of the thoracic spine, which results in mild multifocal foraminal narrowing and mild to moderate canal stenosis at  T12-L1.  4. Severe reverse S-shaped scoliosis.  5. 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  The osseous structures are better evaluated on the prior CT thoracic spine.  6. No significant change in the mild to moderate right hydronephrosis.  7. Lesser incidental findings described above      Dictated by (CST): Jeramie Madera MD on 7/13/2024 at 10:27 PM      MRI of the lumbar spine as of July 6, 2024  FINDINGS:  NUMERATION: For the purposes of this examination, the lowest fully formed disc space is designated as L5-S1.  ALIGNMENT: Levoscoliosis of the lower lumbar spine is present with angulated dextroscoliosis of the lower thoracic spine. There is preservation of the expected lumbar lordosis.  BONES: There is susceptibility artifact at L4-L5 related to posterior fusion hardware. No fracture or suspicious osseous lesion is evident. A well-circumscribed T1 hyperintense, T2 hyperintense lesion of the L3 vertebral segment may reflect a hemangioma.  CORD/CAUDA EQUINA: The conus medullaris terminates at the level of the L4 vertebral segment. The distal cord and nerve roots have normal caliber, contour, and signal intensity.  PARASPINAL AREA: No visible mass.    OTHER:   Scattered T2 hyperintense probable hepatic cysts are noted. There may be right-sided hydronephrosis. The left kidney appears to be ectopic and is situated towards the pelvis.     LUMBAR DISC LEVELS:  L1-L2: A mild diffuse disc bulge is apparent. There is left greater than right hypertrophic facet arthrosis. These findings contribute to asymmetric moderate left foraminal compromise.  L2-L3: A mild disc bulge is present. There is asymmetric hypertrophic left-sided facet arthrosis with associated facet joint effusion. These findings cause asymmetric mild left foraminal impingement.  L3-L4: There is a mild disc bulge. There is susceptibility artifact from posterior fusion hardware. There is no  spinal canal or foraminal impingement.  L4-L5: There is bilateral susceptibility artifact related to transpedicular screws. Fluid surrounding the right-sided screw measures up to 1.8 x 5.1 x 1.4 cm. Fluid surrounding the left screw measures up to 2.9 x 4.4 x 2.7 cm. These findings may  contribute to left worse than right foraminal compromise.    L5-S1: Hypertrophic facet arthrosis is demonstrated. Buckling of the ligamentum flavum is evident. No significant spinal canal or foraminal stenosis results.      Impression   CONCLUSION:  1. Postoperative changes of posterior spinal fusion at fluid collections are hardware. These may reflect postoperative seromas, with or without superimposed infection.     2. Angulated thoracolumbar scoliosis with mild multilevel degenerative changes as detailed above.     3. Persistent right-sided hydronephrosis.     4. Ectopic left pelvic kidney.     5. Lesser incidental findings as above.     A preliminary report was issued by the [facility] teleradiology service. There are no major discrepancies.        Dictated by (CST): James Ramirez MD on 7/06/2024 at 4:24 PM         Assessment:  Low back pain status post spinal fusion pain syndrome.  Multilevel degenerative disc disease spine.  Scoliosis.    Plan:  Would recommend continue gabapentin 300 mg 3 times per day Tylenol 500 mg every 4 hours.  Encouraged to use oxycodone 5 mg every 4 hours as needed.    Conservative vs. Interventional pain treatment options were discussed at length including pharmacotherapy (eg. Anti- inflammatories, muscle relaxants, neuropathic medications, oral steroids, analgesics), injections, and further testing. Risks and benefits of all options were discussed to patients satisfaction. All questions were answered. Patient agreeable to treatment plan. Greater than 50% of the time was spent with counseling (nature of discussion centered around pain, therapy, and treatment options), face to face time, time  spent reviewing data, obtaining patient information and discussing the care with the patients health care providers.       Total visit time 20 minutes.    CHRISTIANE PEDRO MD  7/14/2024  Chronic Pain Service [phone]

## 2024-07-15 NOTE — PLAN OF CARE
Fall precautions in place. Call light within reach. Patient care needs addressed.     Problem: Patient Centered Care  Goal: Patient preferences are identified and integrated in the patient's plan of care  Description: Interventions:  - Provide timely, complete, and accurate information to patient/family  - Incorporate patient and family knowledge, values, beliefs, and cultural backgrounds into the planning and delivery of care  - Encourage patient/family to participate in care and decision-making at the level they choose  - Honor patient and family perspectives and choices  Outcome: Progressing     Problem: PAIN - ADULT  Goal: Verbalizes/displays adequate comfort level or patient's stated pain goal  Description: INTERVENTIONS:  - Encourage pt to monitor pain and request assistance  - Assess pain using appropriate pain scale  - Administer analgesics based on type and severity of pain and evaluate response  - Implement non-pharmacological measures as appropriate and evaluate response  - Consider cultural and social influences on pain and pain management  - Manage/alleviate anxiety  - Utilize distraction and/or relaxation techniques  - Monitor for opioid side effects  - Notify MD/LIP if interventions unsuccessful or patient reports new pain  - Anticipate increased pain with activity and pre-medicate as appropriate  Outcome: Progressing     Problem: RISK FOR INFECTION - ADULT  Goal: Absence of fever/infection during anticipated neutropenic period  Description: INTERVENTIONS  - Monitor WBC  - Administer growth factors as ordered  - Implement neutropenic guidelines  Outcome: Progressing     Problem: SAFETY ADULT - FALL  Goal: Free from fall injury  Description: INTERVENTIONS:  - Assess pt frequently for physical needs  - Identify cognitive and physical deficits and behaviors that affect risk of falls.  - [place] fall precautions as indicated by assessment.  - Educate pt/family on patient safety including physical  limitations  - Instruct pt to call for assistance with activity based on assessment  - Modify environment to reduce risk of injury  - Provide assistive devices as appropriate  - Consider OT/PT consult to assist with strengthening/mobility  - Encourage toileting schedule  Outcome: Progressing     Problem: DISCHARGE PLANNING  Goal: Discharge to home or other facility with appropriate resources  Description: INTERVENTIONS:  - Identify barriers to discharge w/pt and caregiver  - Include patient/family/discharge partner in discharge planning  - Arrange for needed discharge resources and transportation as appropriate  - Identify discharge learning needs (meds, wound care, etc)  - Arrange for interpreters to assist at discharge as needed  - Consider post-discharge preferences of patient/family/discharge partner  - Complete POLST form as appropriate  - Assess patient's ability to be responsible for managing their own health  - Refer to Case Management Department for coordinating discharge planning if the patient needs post-hospital services based on physician/LIP order or complex needs related to functional status, cognitive ability or social support system  Outcome: Progressing

## 2024-07-15 NOTE — CHRONIC PAIN
[facility]  Inpatient Pain Management Progress Note      Patient name: Tess Melendez 79 year old female  : 7/15/1945  MRN: [id number]    Diagnosis:   1. Inability to walk          Current hospital day: Hospital Day: 4    Pain Scores: 4    Subjective: Pt seen, no acute overnight changes, feeling better this am. Tolerating po. Was bale to ambulate and sit in chair. Denies any changes in bowel or bladder, fever, chills, cp, or sob    History:  Past Medical History:    Endometriosis    Pelvic kidney    left side    Scoliosis     Past Surgical History:   Procedure Laterality Date    Hysterectomy      partial in her 30s for endometriosis    Lumbar spine surgery  2024    L4-5 MINIMALLY INVASIVE TRANSFORAMINAL LUMBAR INTERBODY FUSION WITH REPAIR OF DUROTOMYCDH    Total hip arthroplasty Right 2018    Right total hip arthroplasty     Family History   Problem Relation Age of Onset    Heart Attack Mother     Cancer Brother     Diabetes Neg     Hypertension Neg       reports that she has never smoked. She has never used smokeless tobacco. She reports that she does not drink alcohol and does not use drugs.    Allergies:  No Known Allergies    Current Medications:  Scheduled Meds:   docusate sodium  100 mg Oral BID    acetaminophen  500 mg Oral q4h    cefTRIAXone  1 g Intravenous Q24H    heparin  5,000 Units Subcutaneous Q8H JANET    pantoprazole  40 mg Oral QAM AC    gabapentin  300 mg Oral TID     Continuous Infusions:  PRN Meds:.  polyethylene glycol (PEG 3350)    magnesium hydroxide    bisacodyl    fleet enema    oxyCODONE    Exam:Blood pressure 118/54, pulse 53, temperature 98.5 °F (36.9 °C), temperature source Oral, resp. rate 16, weight 122 lb 6.4 oz (55.5 kg), SpO2 98%.    General: Alert and oriented x3, NAD, appears stated age, appropriate disposition and demeanor, answers questions appropriately   Head: normocephalic, atraumatic  Eyes: anicteric; no injection  Ears: no obvious deformities  noted   Nose: externally grossly within normal limits, no unusual discharge or rhinorrhea   Throat: lips grossly within normal limits by visual exam externally  Neck: supple, trachea midline, no obvious JVD  Chest: S1, S2, RRR, no obvious visual deformity  Respiratory: CTAB  Abdomen: soft, non-tender, bowel sounds present  Neuro/ Musculoskeletal: moving all extremities, noted with 4/5 BLE, SILT  Assessment/Plan:  Pt is 79 yo female with lumbar radiculopathy, and lumbar facet arthropathy, admitted for inability to ambulate   -cont with current analgesia regimen  -cont w gabapentin 300mg po tid  -ensure pt on bowel regimen  -pt/ot    Seen by Dr Mendoza and nursing team   KRISTOPHER Mustafa   Total Time: 20     Lengthy discussion of risks, benefits, and alternative treatments were reviewed to patients satisfaction. All questions were answered. Patient agreeable to plan. Greater than 50% of the time was spent with counseling (nature of discussion centered around pain, therapy, and treatment options), face to face time, time spent reviewing data, obtaining patient information and discussing the care with the patients health care providers.     Chronic Pain Service [phone]

## 2024-07-15 NOTE — CM/SW NOTE
07/15/24 1000   Choice of Post-Acute Provider   Informed patient of right to choose their preferred provider Yes   List of appropriate post-acute services provided to patient/family with quality data Yes   [facility]/family choice [facility]   Information given to Patient     SW met with patient bedside to discuss discharge plan.    Medicare list of quality data provided to patient and explained in detail.    The patient would like [facility].    SW [facility] [place] and notified liaison Kristi.    Patient requested private room but SW explained can be requested but not guaranteed.    Therapy/pain management to re-evaluate patient.    SW asked DORETHA Romero to add facility of choice to [facility] portal.    PLAN: [facility] to [facility]    / to remain available for support and/or discharge planning.     Yamilet Mckeon MSW, LSW [phone]

## 2024-07-15 NOTE — PHYSICAL THERAPY NOTE
PHYSICAL THERAPY TREATMENT NOTE - INPATIENT     Room Number: 547B/547-B       Presenting Problem: inability to walk       Problem List  Principal Problem:    Inability to walk      PHYSICAL THERAPY ASSESSMENT   Chart reviewed pt with prior spine sx at [facility] on 6/24/24 DC to home setting with need for readmission at  6/29--7/06/24 DC to home setting pt was seen by neuro surgery this admission .  Pt returning to  on 7/12/24 for inability to care for self / pain / inability to ambulate.  Neuro and Pain team is following pt this admission .  Pt reports a goal for DC to home settiing stating she can not care for herself at home.   Pt has a RW .    Therapy arrived in room introduced self--pt with need for tolieting declining to use brace initially due to need for tolieting.    Pt did agree to use of brace in room after tolieting.           Patient demonstrates good  progress this session, goals  remain in progress.      Patient is requiring  CGA to SBA   as a result of the following impairments: decreased functional strength, decreased endurance/aerobic capacity, pain, impaired standing balance, medical status, and per pt unaware of post op spine precautions and spine sparing fxn mobility  .     Patient continues to function below baseline with bed mobility, transfers, gait, stair negotiation, standing prolonged periods, and performing household tasks.  Contributing factors to remaining limitations include decreased functional strength, pain, impaired standing balance, decreased muscular endurance, and medical status.  Next session anticipate patient to progress bed mobility, transfers, gait, standing prolonged periods, and with recall of therapy pt education recalling spine precautions and returning demonstration of improved posture and spine sparing fxn mobility  .  Physical Therapy will continue to follow patient for duration of hospitalization.    Patient continues to benefit from continued skilled PT  services: to promote return to prior level of function and safety with continuous assistance and gradual rehabilitative therapy . Pt with 2 recent acute admission as above with DC to home and need for readmission > pt with goal for DC to rehab facility .   Recent spine sx 24,     PLAN  PT Treatment Plan: Bed mobility;Body mechanics;Endurance;Energy conservation;Patient education;Family education;Gait training;Balance training;Transfer training;Stair training  Frequency (Obs): 3-5x/week    SUBJECTIVE  \"I am going to rehab , I can not take care of myself at home \"     Post op pain rated at 9/10 --pain service on consult.   Pt providing details of PMH related to scoliosis/ prior hip replacement sx / recent spine sx and multiple prior admissions.     OBJECTIVE  Precautions: Spine  Brace in room used --pt with good understanding of donning brace     WEIGHT BEARING RESTRICTION   None   Pt with intact B Ankle DF and PF returning demonstration of B AP with ability to tolerate resistance at ankles.   Pt able to return demonstration of R / L LAQ reporting increase back pain .  Pt able to ambulate with RW without LE instability observed. Pt reporting generalized B LE weakness ,denies numbness yet experiencing tingling in B LE distal > proximally.              PAIN ASSESSMENT   Ratin  Location: pt rating pain at 9/10  agreeable to ambulation and fxn mobility with good tolerance overall observed . Pain localized to back area sx site radiating into B lateral thigh traveling all the way to feet.  pt with \"tingling \" in both distal LE . Pt denies numbness of B LE  Management Techniques: Activity promotion;Body mechanics;Breathing techniques;Relaxation;Repositioning    BALANCE  Static Sitting: Good  Dynamic Sitting: Fair +  Static Standing: Fair  Dynamic Standing: Fair -    ACTIVITY TOLERANCE  Pulse: 86        BP: (!) 121/106 (post activity   pt is moving UE and talking --difficulty keeping still)              O2  WALK  Oxygen Therapy  SPO2% Ambulation on Room Air: 97    AM-PAC '6-Clicks' INPATIENT SHORT FORM - BASIC MOBILITY  How much difficulty does the patient currently have...  Patient Difficulty: Turning over in bed (including adjusting bedclothes, sheets and blankets)?: A Little   Patient Difficulty: Sitting down on and standing up from a chair with arms (e.g., wheelchair, bedside commode, etc.): A Little   Patient Difficulty: Moving from lying on back to sitting on the side of the bed?: A Little   How much help from another person does the patient currently need...   Help from Another: Moving to and from a bed to a chair (including a wheelchair)?: A Little   Help from Another: Need to walk in hospital room?: A Little   Help from Another: Climbing 3-5 steps with a railing?: A Little     AM-PAC Score:  Raw Score: 18   Approx Degree of Impairment: 46.58%   Standardized Score (AM-PAC Scale): 43.63   CMS Modifier (G-Code): CK    FUNCTIONAL ABILITY STATUS  Functional Mobility/Gait Assessment  Gait Assistance:  (SBA to CGA as needed)  Distance (ft): 20 ft x 1 to bathroom .   150 ft x 1  Assistive Device:  (rollator)  Pattern: R Steppage;L Steppage (cues for proper use of rollator and upright posture safety reinforced)    Bed mobility not assessed therapy demonstrating for pt proper log roll sequencing -Handout provided.   Pt with need for tolieting during session.  SBA to CGA for mobility into bathroom using rollator. Pt declining need for support in bathroom distant supervision provided.   Therapy demonstration of proper spine sparing sequencing for tolieting hygiene reviewed  Patient received seated in bedside chair, agreeable to physical therapy. Vital signs monitored as noted above, no adverse symptoms and patient stable during session.     Education with pt provided verbally, via demonstration, on written handout, and while practicing during session on Spine precautions, Physical therapy plan of care, physiological  benefits of out of bed mobility, and post op back sx therapy pt education , B AP ,spine sparing sequencing fxn mobility and DC Planning  .   The patient's Approx Degree of Impairment: 46.58% has been calculated based on documentation in the [facility] '6 clicks' Inpatient Daily Activity Short Form.  Research supports that patients with this level of impairment may benefit from home with HH PT . Pt with goal and per chart DC plan for [facility] when pt is medically stable.     Final disposition will be made by interdisciplinary medical team.      Patient End of Session: Up in chair;Needs met;Call light within reach;RN aware of session/findings;Bracing education provided per handout;All patient questions and concerns addressed;Alarm set    CURRENT GOALS   Goals to be met by: 7/25/24  Patient Goal Patient's self-stated goal is: to go home   Goal #1 Patient is able to demonstrate supine - sit EOB @ level: supervision      Goal #1   Current Status  NA   Goal #2 Patient is able to demonstrate transfers Sit to/from Stand at assistance level: supervision with rollator      Goal #2  Current Status  SBA    Goal #3 Patient is able to ambulate 100 feet with assist device: rollator at assistance level: supervision   Goal #3   Current Status  As above    Goal #4     Goal #4   Current Status     Goal #5 Patient to demonstrate independence with home activity/exercise instructions provided to patient in preparation for discharge.   Goal #5   Current Status  in progress    Goal #6     Goal #6  Current Status     Therapy activity 2 units

## 2024-07-15 NOTE — DIETARY NOTE
ADULT NUTRITION INITIAL ASSESSMENT    Pt is at moderate nutrition risk.  Pt meets moderate malnutrition criteria.      CRITERIA FOR MALNUTRITION DIAGNOSIS:  Criteria for non-severe malnutrition diagnosis: acute illness/injury related to wt loss 5% in 1 month, energy intake less than 75% for greater than 7 days, body fat mild depletion, and muscle mass mild depletion.    RECOMMENDATIONS TO MD: See Nutrition Intervention    ADMITTING DIAGNOSIS:  Inability to walk [R26.2]  PERTINENT PAST MEDICAL HISTORY:   Past Medical History:    Endometriosis    Pelvic kidney    left side    Scoliosis       PATIENT STATUS: Initial 07/15/24: Pt admit for inability to walk. PMHx sig for scoliosis, s/p back surgery on 6/24/24 at [facility]. Pt assessed due to screening at risk for MST. Visited pt today, talkative and pleasant. Diet Hx: Pt reported fair appetite, consuming ~75% of most meals per documentation. Endorsed fair/decreased appetite PTA, only consuming \"enough to take my meds\", further explained eats about 2-3 meals per day, such as bagel and or 2 eggs for breakfast, tuna sandwich for lunch, and vegetables and sometimes fish/chicken for dinner,  but smaller amounts than usual. Stated intakes have been decreased (to about <75% of meals) in the last 3 weeks since her back surgery due to loss of appetite. Weight Hx: pt endorsed weight loss at admission MST. Stated UBW: used to be 115-120# but in recent year or so had put on some weight and had gone up to ~130#. Per weight history on EHR, pt was 130# as of 6/18/24, #. This 8# weight loss represents a 6.1% loss of body weight in 1 month, significant. NFPE conducted, see findings below. Pt meets criteria for Moderate PCM in the context of acute illness/injury. Nutrition Plan: explained need for adequate po intakes, recommended protein/calorie supplementation to help support pt meet nutrient needs. Pt verbalized understanding, agreeable to vanilla Ensure Enlive  daily.    FOOD/NUTRITION RELATED HISTORY:  Appetite: Fair  Intake: ~75% xmost meals documented since admit  Intake Meeting Needs: No, but oral nutrition supplements (ONS) to maximize  Percent Meals Eaten (last 6 days)       Date/Time Percent Meals Eaten (%)    07/12/24 1542 75 %    07/13/24 1149 75 %    07/14/24 1030 75 %    07/14/24 1400 50 %    07/15/24 1010 80 %            Food Allergies: No Known Food Allergies (NKFA)  Cultural/Ethnic/Adventist Preferences: None    GASTROINTESTINAL: no GI issues reported per pt/chart and +BM 7/14 pe rpt report    MEDICATIONS: reviewed   docusate sodium  100 mg Oral BID    acetaminophen  500 mg Oral q4h    cefTRIAXone  1 g Intravenous Q24H    heparin  5,000 Units Subcutaneous Q8H JANET    pantoprazole  40 mg Oral QAM AC    gabapentin  300 mg Oral TID     LABS: reviewed  Recent Labs     07/12/24  1110 07/14/24  0943   GLU 83 90   BUN 13 10   CREATSERUM 0.94 0.85   CA 9.2 9.3   MG  --  2.1    141   K 4.6 4.6    108   CO2 26.0 26.0   PHOS  --  3.3   OSMOCALC 287 291       NUTRITION RELATED PHYSICAL FINDINGS:  - Nutrition Focused Physical Exam (NFPE): mild depletion body fat triceps region and fat overlying rib cage region and mild depletion muscle mass clavicle region, scapular region, and dorsal munguia region  - Fluid Accumulation: none  See RN documentation for details  - Skin Integrity: intact and low risk  See RN documentation for details    ANTHROPOMETRICS:  HT:  162.6 cm (5'4\")  WT: 55.5 kg (122 lb 6.4 oz)   BMI: Body mass index is 21.01 kg/m².  BMI CLASSIFICATION: 19-24.9 kg/m2 - WNL  IBW: 120 lbs        102% IBW  Usual Body Wt: 130 lbs 1 month      94% UBW    WEIGHT HISTORY:  Patient Weight(s) for the past 336 hrs:   Weight   07/12/24 1610 55.5 kg (122 lb 6.4 oz)   07/12/24 0904 59 kg (130 lb)     Wt Readings from Last 10 Encounters:   07/12/24 55.5 kg (122 lb 6.4 oz)   07/13/24 55.5 kg (122 lb 5.7 oz)   06/29/24 61.5 kg (135 lb 9.6 oz)   06/18/24 59.2 kg (130 lb  9.6 oz)   01/09/24 58.5 kg (129 lb)   09/15/23 61.2 kg (135 lb)   09/01/23 61.2 kg (135 lb)   03/11/23 64 kg (141 lb)   01/05/23 55 kg (121 lb 4.1 oz)   11/23/22 55.8 kg (123 lb)     NUTRITION DIAGNOSIS/PROBLEM:   Moderate Malnutrition related to Acute - Physiological causes resulting in anorexia or diminished intake in the context of increased needs after back surgery as evidenced by wt loss 5% in 1 month, energy intake less than 75% for greater than 7 days, body fat mild depletion, and muscle mass mild depletion    NUTRITION INTERVENTION:     NUTRITION PRESCRIPTION:   Estimated Nutrition needs: --dosing wt of 55. kg - wt taken on 7/12/24  Calories: 1357-7872 calories/day (28-30 calories per kg Dosing wt)  Protein: 67-83 g protein/day (1.2-1.5 g protein/kg Dosing wt)  Fluid Needs: 5634-0543   mL/day (1mL/kcal/day)    - Diet:       Procedures    Regular/General diet Is Patient on Accuchecks? No      - RD Malnutrition Care Plan: Encouraged increased PO intake, Encouraged small frequent meals with emphasis on high calorie/high protein, and Initiated ONS (oral nutritional supplements)  - Medical Food Supplements-RD added Ensure Enlive (350 calories/ 20 g protein each) Daily Vanilla. Rational/use of oral supplements discussed.  - Vitamin and mineral supplements: none  - Feeding assistance: meal set up  - Nutrition education: Discussed importance of adequate energy and protein intake and role of ONS to support    - Coordination of nutrition care: collaboration with other providers  - Discharge and transfer of nutrition care to new setting or provider: monitor plans    MONITOR AND EVALUATE/NUTRITION GOALS:  - Food and Nutrient Intake:      Monitor: adequacy of PO intake and tolerance of supplement intake  - Food and Nutrient Administration:      Monitor: N/A  - Anthropometric Measurement:    Monitor weight  - Nutrition Goals:      halt wt loss, gradual wt gain as able, intake to meet needs, good supplement intake,  minimize lean body mass loss, and support body systems    DIETITIAN FOLLOW UP: RD to follow and monitor nutrition status.    Sari Rojas RD, LDN  Clinical Dietitian  Office: [phone]

## 2024-07-15 NOTE — PLAN OF CARE
No acute changes. Fall precautions in place. Call light in reach.    Problem: Patient Centered Care  Goal: Patient preferences are identified and integrated in the patient's plan of care  Description: Interventions:  - What would you like us to know as we care for you? None stated  - Provide timely, complete, and accurate information to patient/family  - Incorporate patient and family knowledge, values, beliefs, and cultural backgrounds into the planning and delivery of care  - Encourage patient/family to participate in care and decision-making at the level they choose  - Honor patient and family perspectives and choices  Outcome: Progressing     Problem: Patient/Family Goals  Goal: Patient/Family Long Term Goal  Description: Patient's Long Term Goal: free of pain    Interventions:  - pain medication administered as needed  - See additional Care Plan goals for specific interventions  Outcome: Progressing  Goal: Patient/Family Short Term Goal  Description: Patient's Short Term Goal: free of falls     Interventions:   - fall precautions in place  - See additional Care Plan goals for specific interventions  Outcome: Progressing     Problem: PAIN - ADULT  Goal: Verbalizes/displays adequate comfort level or patient's stated pain goal  Description: INTERVENTIONS:  - Encourage pt to monitor pain and request assistance  - Assess pain using appropriate pain scale  - Administer analgesics based on type and severity of pain and evaluate response  - Implement non-pharmacological measures as appropriate and evaluate response  - Consider cultural and social influences on pain and pain management  - Manage/alleviate anxiety  - Utilize distraction and/or relaxation techniques  - Monitor for opioid side effects  - Notify MD/LIP if interventions unsuccessful or patient reports new pain  - Anticipate increased pain with activity and pre-medicate as appropriate  Outcome: Progressing     Problem: RISK FOR INFECTION - ADULT  Goal:  Absence of fever/infection during anticipated neutropenic period  Description: INTERVENTIONS  - Monitor WBC  - Administer growth factors as ordered  - Implement neutropenic guidelines  Outcome: Progressing     Problem: SAFETY ADULT - FALL  Goal: Free from fall injury  Description: INTERVENTIONS:  - Assess pt frequently for physical needs  - Identify cognitive and physical deficits and behaviors that affect risk of falls.  - [place] fall precautions as indicated by assessment.  - Educate pt/family on patient safety including physical limitations  - Instruct pt to call for assistance with activity based on assessment  - Modify environment to reduce risk of injury  - Provide assistive devices as appropriate  - Consider OT/PT consult to assist with strengthening/mobility  - Encourage toileting schedule  Outcome: Progressing     Problem: DISCHARGE PLANNING  Goal: Discharge to home or other facility with appropriate resources  Description: INTERVENTIONS:  - Identify barriers to discharge w/pt and caregiver  - Include patient/family/discharge partner in discharge planning  - Arrange for needed discharge resources and transportation as appropriate  - Identify discharge learning needs (meds, wound care, etc)  - Arrange for interpreters to assist at discharge as needed  - Consider post-discharge preferences of patient/family/discharge partner  - Complete POLST form as appropriate  - Assess patient's ability to be responsible for managing their own health  - Refer to Case Management Department for coordinating discharge planning if the patient needs post-hospital services based on physician/LIP order or complex needs related to functional status, cognitive ability or social support system  Outcome: Progressing

## 2024-07-16 NOTE — PLAN OF CARE
No complaints overnight.   Problem: Patient Centered Care  Goal: Patient preferences are identified and integrated in the patient's plan of care  Description: Interventions:  - What would you like us to know as we care for you?   - Provide timely, complete, and accurate information to patient/family  - Incorporate patient and family knowledge, values, beliefs, and cultural backgrounds into the planning and delivery of care  - Encourage patient/family to participate in care and decision-making at the level they choose  - Honor patient and family perspectives and choices  Outcome: Progressing     Problem: Patient/Family Goals  Goal: Patient/Family Long Term Goal  Description: Patient's Long Term Goal:   Interventions:  - See additional Care Plan goals for specific interventions  Outcome: Progressing  Goal: Patient/Family Short Term Goal  Description: Patient's Short Term Goal:   Interventions:   - See additional Care Plan goals for specific interventions  Outcome: Progressing     Problem: PAIN - ADULT  Goal: Verbalizes/displays adequate comfort level or patient's stated pain goal  Description: INTERVENTIONS:  - Encourage pt to monitor pain and request assistance  - Assess pain using appropriate pain scale  - Administer analgesics based on type and severity of pain and evaluate response  - Implement non-pharmacological measures as appropriate and evaluate response  - Consider cultural and social influences on pain and pain management  - Manage/alleviate anxiety  - Utilize distraction and/or relaxation techniques  - Monitor for opioid side effects  - Notify MD/LIP if interventions unsuccessful or patient reports new pain  - Anticipate increased pain with activity and pre-medicate as appropriate  Outcome: Progressing     Problem: RISK FOR INFECTION - ADULT  Goal: Absence of fever/infection during anticipated neutropenic period  Description: INTERVENTIONS  - Monitor WBC  - Administer growth factors as ordered  - Implement  neutropenic guidelines  Outcome: Progressing     Problem: SAFETY ADULT - FALL  Goal: Free from fall injury  Description: INTERVENTIONS:  - Assess pt frequently for physical needs  - Identify cognitive and physical deficits and behaviors that affect risk of falls.  - [place] fall precautions as indicated by assessment.  - Educate pt/family on patient safety including physical limitations  - Instruct pt to call for assistance with activity based on assessment  - Modify environment to reduce risk of injury  - Provide assistive devices as appropriate  - Consider OT/PT consult to assist with strengthening/mobility  - Encourage toileting schedule  Outcome: Progressing     Problem: DISCHARGE PLANNING  Goal: Discharge to home or other facility with appropriate resources  Description: INTERVENTIONS:  - Identify barriers to discharge w/pt and caregiver  - Include patient/family/discharge partner in discharge planning  - Arrange for needed discharge resources and transportation as appropriate  - Identify discharge learning needs (meds, wound care, etc)  - Arrange for interpreters to assist at discharge as needed  - Consider post-discharge preferences of patient/family/discharge partner  - Complete POLST form as appropriate  - Assess patient's ability to be responsible for managing their own health  - Refer to Case Management Department for coordinating discharge planning if the patient needs post-hospital services based on physician/LIP order or complex needs related to functional status, cognitive ability or social support system  Outcome: Progressing

## 2024-07-16 NOTE — PROGRESS NOTES
[facility]  part of [facility]    Progress Note    Tess Melendez Patient Status:  Observation    7/15/1945 MRN [id number]   Location [facility] 5SW/SE Attending Clarita Cardenas MD   Hosp Day # 0 PCP MARCIA NAVARRO       Subjective:   Tess Melendez is a(n) 79 year old female back pain and inability to walk.    Objective:     Vitals:    07/15/24 2039   BP: 105/54   Pulse: 56   Resp: 18   Temp: 98.7 °F (37.1 °C)       Intake/Output Summary (Last 24 hours) at 7/15/2024 2256  Last data filed at 7/15/2024 1600  Gross per 24 hour   Intake 370 ml   Output 150 ml   Net 220 ml     Wt Readings from Last 2 Encounters:   24 122 lb 6.4 oz (55.5 kg)   24 122 lb 5.7 oz (55.5 kg)       General appearance:  alert, appears stated age, and cooperative  Head: Normocephalic, without obvious abnormality, atraumatic  Eyes: conjunctivae/corneas clear. PERRL, EOM's intact. Fundi benign.  Neck: no adenopathy, no carotid bruit, no JVD, supple, symmetrical, trachea midline, and thyroid not enlarged, symmetric, no tenderness/mass/nodules  Pulmonary: clear to auscultation bilaterally  Cardiovascular: S1, S2 normal, no murmur, click, rub or gallop, regular rate and rhythm  Abdominal: soft, non-tender; bowel sounds normal; no masses,  no organomegaly  Extremities: extremities normal, atraumatic, no cyanosis or edema  Pulses: 2+ and symmetric  Neurologic: Grossly normal  Genital Exam: defer exam    Current Medications:    Current Facility-Administered Medications:     docusate sodium (Colace) cap 100 mg, 100 mg, Oral, BID    polyethylene glycol (PEG 3350) (Miralax) 17 g oral packet 17 g, 17 g, Oral, Daily PRN    magnesium hydroxide (Milk of Magnesia) 400 MG/5ML oral suspension 30 mL, 30 mL, Oral, Daily PRN    bisacodyl (Dulcolax) 10 MG rectal suppository 10 mg, 10 mg, Rectal, Daily PRN    fleet enema (Fleet) 7-19 GM/118ML rectal enema 133 mL, 1 enema, Rectal, Once PRN    acetaminophen (Tylenol Extra  Strength) tab 500 mg, 500 mg, Oral, q4h    oxyCODONE immediate release tab 5 mg, 5 mg, Oral, Q4H PRN    cefTRIAXone (Rocephin) 1 g in sodium chloride 0.9% 100 mL IVPB-ADDV, 1 g, Intravenous, Q24H    heparin (Porcine) 5000 UNIT/ML injection 5,000 Units, 5,000 Units, Subcutaneous, Q8H JANET    pantoprazole (Protonix) DR tab 40 mg, 40 mg, Oral, QAM AC    gabapentin (Neurontin) cap 300 mg, 300 mg, Oral, TID    Allergies  No Known Allergies    Assessment and Plan:    problem #1 inability to walk: Patient is postop and on oxycodone for pain relief.  In the hospital patient did well with ongoing physical therapy.  While at home patient does have nursing aides that come and home health helps her but this has been inadequate.  Patient is very motivated and could benefit from acute rehab once her pain is adequately controlled.     Problem #2 postoperative pain: Patient will be on gabapentin and oxycodone pain service consult has been requested.     Nutrition: Patient was doing well in the hospital her situation with nutrition was likely secondary to lack of support at home.  Will add nutritional supplement and will continue on regular diet.     Urinary tract infection: Patient has an abnormal UA urine culture is pending in light of patient developing weakness which is much more than her discharge time I will treat this patient for UTI empirically and await culture results.     Edema: Patient had edema of lower extremities which was very well managed with 5 days of outpatient loop diuretic will continue managing the patient and look for swelling and treat with a loop diuretic as needed.     DVT prophylaxis: Patient currently has no active calf swelling patient is postop has limited mobility previously patient was not given heparin because she was postoperative during this admission will continue the same and will place SCD boots.     GI prophylaxis: Patient will be on PPI.     July 13, 2024  Patient seems to be stable urine  analysis is suggestive of UTI.  Started on empiric ceftriaxone urine culture is pending  Urology consult noted further workup including MRI is pending.  Pain service recommendations noted patient will continue on oral gabapentin and oxycodone.  OT and PT is ordered and pending evaluation.     July 14, 2024  Patient is stable reports less back pain but does report tingling in both her feet also reports tingling in her back.  No open wound or ulcer is noted.  Discussed at length with patient to continue taking gabapentin consistently and take oxycodone when needed for pain.  In addition patient will also be evaluated by Occupational Therapy and physical therapy plan is most likely to send to subacute rehab.  Discussed with Dr. Centeno the MRI findings.  Patient does not have any other etiology explaining her back pain and weakness.  OT PT will be ongoing.  Continue IV antibiotics for the time being.  Appreciate pain service recommendations.  Discharge when patient is stable and able to go to subacute rehab.     July 15, 2024  Patient doing well still not able to walk and is bedbound physical therapy evaluation is pending.  Pain service recommendation noted and continue titrating pain medicines for adequate control.  Plan for discharge to subacute rehab.    Results:     Lab Results   Component Value Date    WBC 5.6 07/14/2024    HGB 11.4 (L) 07/14/2024    HCT 36.4 07/14/2024    .0 07/14/2024     07/14/2024    K 4.6 07/14/2024     07/14/2024    CO2 26.0 07/14/2024    CREATSERUM 0.85 07/14/2024    BUN 10 07/14/2024    GLU 90 07/14/2024    CA 9.3 07/14/2024    ALB 4.0 07/14/2024    ALKPHO 118 07/14/2024    BILT 0.4 07/14/2024    TP 6.7 07/14/2024    AST 21 07/14/2024    ALT 9 (L) 07/14/2024    TSH 0.900 09/28/2021     08/07/2020    DDIMER 0.35 11/02/2021    ESRML 10 07/11/2022    CRP <0.29 07/11/2022    MG 2.1 07/14/2024    PHOS 3.3 07/14/2024    TROP <0.045 08/07/2020    B12 590 06/30/2024      Hospital Encounter on 07/12/24   1. Urine Culture, Routine     Status: None    Collection Time: 07/13/24 12:12 PM    Specimen: Urine, clean catch   Result Value Ref Range    Urine Culture <10,000 CFU/ML Gram negative antoni N/A       No results found.                FRANCIS PEREIRA MD  7/15/2024

## 2024-07-16 NOTE — PLAN OF CARE
Fall precautions in place. Call light within reach. Patient care needs addressed.     Problem: Patient Centered Care  Goal: Patient preferences are identified and integrated in the patient's plan of care  Description: Interventions:  - Provide timely, complete, and accurate information to patient/family  - Incorporate patient and family knowledge, values, beliefs, and cultural backgrounds into the planning and delivery of care  - Encourage patient/family to participate in care and decision-making at the level they choose  - Honor patient and family perspectives and choices  Outcome: Progressing      Problem: PAIN - ADULT  Goal: Verbalizes/displays adequate comfort level or patient's stated pain goal  Description: INTERVENTIONS:  - Encourage pt to monitor pain and request assistance  - Assess pain using appropriate pain scale  - Administer analgesics based on type and severity of pain and evaluate response  - Implement non-pharmacological measures as appropriate and evaluate response  - Consider cultural and social influences on pain and pain management  - Manage/alleviate anxiety  - Utilize distraction and/or relaxation techniques  - Monitor for opioid side effects  - Notify MD/LIP if interventions unsuccessful or patient reports new pain  - Anticipate increased pain with activity and pre-medicate as appropriate  Outcome: Progressing     Problem: RISK FOR INFECTION - ADULT  Goal: Absence of fever/infection during anticipated neutropenic period  Description: INTERVENTIONS  - Monitor WBC  - Administer growth factors as ordered  - Implement neutropenic guidelines  Outcome: Progressing     Problem: SAFETY ADULT - FALL  Goal: Free from fall injury  Description: INTERVENTIONS:  - Assess pt frequently for physical needs  - Identify cognitive and physical deficits and behaviors that affect risk of falls.  - [place] fall precautions as indicated by assessment.  - Educate pt/family on patient safety including physical  limitations  - Instruct pt to call for assistance with activity based on assessment  - Modify environment to reduce risk of injury  - Provide assistive devices as appropriate  - Consider OT/PT consult to assist with strengthening/mobility  - Encourage toileting schedule  Outcome: Progressing     Problem: DISCHARGE PLANNING  Goal: Discharge to home or other facility with appropriate resources  Description: INTERVENTIONS:  - Identify barriers to discharge w/pt and caregiver  - Include patient/family/discharge partner in discharge planning  - Arrange for needed discharge resources and transportation as appropriate  - Identify discharge learning needs (meds, wound care, etc)  - Arrange for interpreters to assist at discharge as needed  - Consider post-discharge preferences of patient/family/discharge partner  - Complete POLST form as appropriate  - Assess patient's ability to be responsible for managing their own health  - Refer to Case Management Department for coordinating discharge planning if the patient needs post-hospital services based on physician/LIP order or complex needs related to functional status, cognitive ability or social support system  Outcome: Progressing

## 2024-07-17 VITALS
SYSTOLIC BLOOD PRESSURE: 120 MMHG | WEIGHT: 122.38 LBS | BODY MASS INDEX: 21 KG/M2 | HEART RATE: 78 BPM | RESPIRATION RATE: 16 BRPM | OXYGEN SATURATION: 98 % | TEMPERATURE: 98 F | DIASTOLIC BLOOD PRESSURE: 79 MMHG

## 2024-07-17 RX ORDER — BISACODYL 10 MG
10 SUPPOSITORY, RECTAL RECTAL
Qty: 12 SUPPOSITORY | Refills: 0 | Status: SHIPPED | OUTPATIENT
Start: 2024-07-17

## 2024-07-17 RX ORDER — POLYETHYLENE GLYCOL 3350 17 G/17G
17 POWDER, FOR SOLUTION ORAL DAILY PRN
Qty: 30 EACH | Refills: 0 | Status: SHIPPED | OUTPATIENT
Start: 2024-07-17

## 2024-07-17 RX ORDER — PSEUDOEPHEDRINE HCL 30 MG
100 TABLET ORAL 2 TIMES DAILY
Qty: 60 CAPSULE | Refills: 0 | Status: SHIPPED | OUTPATIENT
Start: 2024-07-17

## 2024-07-17 RX ORDER — OXYCODONE HYDROCHLORIDE 5 MG/1
5 TABLET ORAL EVERY 4 HOURS PRN
Qty: 30 TABLET | Refills: 0 | Status: SHIPPED | OUTPATIENT
Start: 2024-07-17

## 2024-07-17 NOTE — PROGRESS NOTES
[facility]  part of [facility]    Progress Note    Tess Melendez Patient Status:  Observation    7/15/1945 MRN [id number]   Location [facility] 5SW/SE Attending Clarita Cardenas MD   Hosp Day # 0 PCP MARCIA NAVARRO       Subjective:   Tess Melendez is a(n) 79 year old female weakness.    Objective:     Vitals:    24 2209   BP: 114/67   Pulse: 70   Resp: 17   Temp: 98.5 °F (36.9 °C)       Intake/Output Summary (Last 24 hours) at 2024 2258  Last data filed at 2024 0950  Gross per 24 hour   Intake 210 ml   Output 200 ml   Net 10 ml     Wt Readings from Last 2 Encounters:   24 122 lb 6.4 oz (55.5 kg)   24 122 lb 5.7 oz (55.5 kg)       General appearance:  alert, appears stated age, and cooperative  Head: Normocephalic, without obvious abnormality, atraumatic  Eyes: conjunctivae/corneas clear. PERRL, EOM's intact. Fundi benign.  Neck: no adenopathy, no carotid bruit, no JVD, supple, symmetrical, trachea midline, and thyroid not enlarged, symmetric, no tenderness/mass/nodules  Pulmonary: clear to auscultation bilaterally  Cardiovascular: S1, S2 normal, no murmur, click, rub or gallop, regular rate and rhythm  Abdominal: soft, non-tender; bowel sounds normal; no masses,  no organomegaly  Extremities: extremities normal, atraumatic, no cyanosis or edema  Pulses: 2+ and symmetric  Neurologic: Grossly normal  Genital Exam: defer exam    Current Medications:    Current Facility-Administered Medications:     docusate sodium (Colace) cap 100 mg, 100 mg, Oral, BID    polyethylene glycol (PEG 3350) (Miralax) 17 g oral packet 17 g, 17 g, Oral, Daily PRN    magnesium hydroxide (Milk of Magnesia) 400 MG/5ML oral suspension 30 mL, 30 mL, Oral, Daily PRN    bisacodyl (Dulcolax) 10 MG rectal suppository 10 mg, 10 mg, Rectal, Daily PRN    fleet enema (Fleet) 7-19 GM/118ML rectal enema 133 mL, 1 enema, Rectal, Once PRN    acetaminophen (Tylenol Extra Strength) tab 500 mg,  500 mg, Oral, q4h    oxyCODONE immediate release tab 5 mg, 5 mg, Oral, Q4H PRN    cefTRIAXone (Rocephin) 1 g in sodium chloride 0.9% 100 mL IVPB-ADDV, 1 g, Intravenous, Q24H    heparin (Porcine) 5000 UNIT/ML injection 5,000 Units, 5,000 Units, Subcutaneous, Q8H JANET    pantoprazole (Protonix) DR tab 40 mg, 40 mg, Oral, QAM AC    gabapentin (Neurontin) cap 300 mg, 300 mg, Oral, TID    Allergies  No Known Allergies    Assessment and Plan:      problem #1 inability to walk: Patient is postop and on oxycodone for pain relief.  In the hospital patient did well with ongoing physical therapy.  While at home patient does have nursing aides that come and home health helps her but this has been inadequate.  Patient is very motivated and could benefit from acute rehab once her pain is adequately controlled.     Problem #2 postoperative pain: Patient will be on gabapentin and oxycodone pain service consult has been requested.     Nutrition: Patient was doing well in the hospital her situation with nutrition was likely secondary to lack of support at home.  Will add nutritional supplement and will continue on regular diet.     Urinary tract infection: Patient has an abnormal UA urine culture is pending in light of patient developing weakness which is much more than her discharge time I will treat this patient for UTI empirically and await culture results.     Edema: Patient had edema of lower extremities which was very well managed with 5 days of outpatient loop diuretic will continue managing the patient and look for swelling and treat with a loop diuretic as needed.     DVT prophylaxis: Patient currently has no active calf swelling patient is postop has limited mobility previously patient was not given heparin because she was postoperative during this admission will continue the same and will place SCD boots.     GI prophylaxis: Patient will be on PPI.     July 13, 2024  Patient seems to be stable urine analysis is suggestive  of UTI.  Started on empiric ceftriaxone urine culture is pending  Urology consult noted further workup including MRI is pending.  Pain service recommendations noted patient will continue on oral gabapentin and oxycodone.  OT and PT is ordered and pending evaluation.     July 14, 2024  Patient is stable reports less back pain but does report tingling in both her feet also reports tingling in her back.  No open wound or ulcer is noted.  Discussed at length with patient to continue taking gabapentin consistently and take oxycodone when needed for pain.  In addition patient will also be evaluated by Occupational Therapy and physical therapy plan is most likely to send to subacute rehab.  Discussed with Dr. Centeno the MRI findings.  Patient does not have any other etiology explaining her back pain and weakness.  OT PT will be ongoing.  Continue IV antibiotics for the time being.  Appreciate pain service recommendations.  Discharge when patient is stable and able to go to subacute rehab.     July 15, 2024  Patient doing well still not able to walk and is bedbound physical therapy evaluation is pending.  Pain service recommendation noted and continue titrating pain medicines for adequate control.  Plan for discharge to subacute rehab.    July 16, 2024  Patient has started minimal bedside physical therapy pain is well-controlled tingling is decreased in the bilateral feet.  Patient is awaiting acceptance at rehab places requests have been sent out.  Continue physical therapy while in hospital.  Overall slight improvement  Results:     Lab Results   Component Value Date    WBC 5.6 07/14/2024    HGB 11.4 (L) 07/14/2024    HCT 36.4 07/14/2024    .0 07/14/2024     07/14/2024    K 4.6 07/14/2024     07/14/2024    CO2 26.0 07/14/2024    CREATSERUM 0.85 07/14/2024    BUN 10 07/14/2024    GLU 90 07/14/2024    CA 9.3 07/14/2024    ALB 4.0 07/14/2024    ALKPHO 118 07/14/2024    BILT 0.4 07/14/2024    TP 6.7  07/14/2024    AST 21 07/14/2024    ALT 9 (L) 07/14/2024    TSH 0.900 09/28/2021     08/07/2020    DDIMER 0.35 11/02/2021    ESRML 10 07/11/2022    CRP <0.29 07/11/2022    MG 2.1 07/14/2024    PHOS 3.3 07/14/2024    TROP <0.045 08/07/2020    B12 590 06/30/2024     Hospital Encounter on 07/12/24   1. Urine Culture, Routine     Status: None    Collection Time: 07/13/24 12:12 PM    Specimen: Urine, clean catch   Result Value Ref Range    Urine Culture <10,000 CFU/ML Gram negative antoni N/A       No results found.                FRANCIS PEREIRA MD  7/16/2024

## 2024-07-17 NOTE — CHRONIC PAIN
[facility]  Inpatient Pain Management Progress Note      Patient name: Tess Melendez 79 year old female  : 7/15/1945  MRN: [id number]    Diagnosis:   1. Inability to walk        Current hospital day: Hospital Day: 6    Pain Scores: 4    Subjective: No acute overnight events, pain well controlled at this time, bilateral LE improvement. Minimal pt/ot. Denies any changes in bowel or bladder, fever, chills, cp, or sob    History:  Past Medical History:    Endometriosis    Pelvic kidney    left side    Scoliosis     Past Surgical History:   Procedure Laterality Date    Hysterectomy      partial in her 30s for endometriosis    Lumbar spine surgery  2024    L4-5 MINIMALLY INVASIVE TRANSFORAMINAL LUMBAR INTERBODY FUSION WITH REPAIR OF DUROTOMYCDH    Total hip arthroplasty Right 2018    Right total hip arthroplasty     Family History   Problem Relation Age of Onset    Heart Attack Mother     Cancer Brother     Diabetes Neg     Hypertension Neg       reports that she has never smoked. She has never used smokeless tobacco. She reports that she does not drink alcohol and does not use drugs.    Allergies:  No Known Allergies    Current Medications:  Scheduled Meds:   docusate sodium  100 mg Oral BID    acetaminophen  500 mg Oral q4h    cefTRIAXone  1 g Intravenous Q24H    heparin  5,000 Units Subcutaneous Q8H JANET    pantoprazole  40 mg Oral QAM AC    gabapentin  300 mg Oral TID     Continuous Infusions:  PRN Meds:.  polyethylene glycol (PEG 3350)    magnesium hydroxide    bisacodyl    fleet enema    oxyCODONE    Exam:Blood pressure 129/59, pulse 56, temperature 98.3 °F (36.8 °C), temperature source Oral, resp. rate 16, weight 122 lb 6.4 oz (55.5 kg), SpO2 100%.    General: Alert and oriented x3, NAD, appears stated age, appropriate disposition and demeanor, answers questions appropriately   Head: normocephalic, atraumatic  Eyes: anicteric; no injection  Ears: no obvious deformities noted   Nose:  externally grossly within normal limits, no unusual discharge or rhinorrhea   Throat: lips grossly within normal limits by visual exam externally  Neck: supple, trachea midline, no obvious JVD  Chest: S1, S2, RRR, no obvious visual deformity  Respiratory: CTAB  Abdomen: soft, non-tender, bowel sounds present  Neuro/ Musculoskeletal:moving all extremities, noted with 4/5 BLE, SILT   Assessment/Plan:  Pt is 77 yo female with lumbar radiculopathy, and lumbar facet arthropathy, admitted for inability to ambulate   -cont with current analgesia regimen, pain well controlled   -cont w gabapentin 300mg po tid  -ensure pt on bowel regimen  -pt/ot     Maverick hernandes and nursing team  KRISTOPHER Mustafa   Total Time: 15     Lengthy discussion of risks, benefits, and alternative treatments were reviewed to patients satisfaction. All questions were answered. Patient agreeable to plan. Greater than 50% of the time was spent with counseling (nature of discussion centered around pain, therapy, and treatment options), face to face time, time spent reviewing data, obtaining patient information and discussing the care with the patients health care providers.     Chronic Pain Service [phone]

## 2024-07-17 NOTE — PLAN OF CARE
Call light within reach. Bed alarm on. Safety precautions in place. Calls appropriately.    Problem: Patient Centered Care  Goal: Patient preferences are identified and integrated in the patient's plan of care  Description: Interventions:  - What would you like us to know as we care for you? From home alone w/[facility].  - Provide timely, complete, and accurate information to patient/family  - Incorporate patient and family knowledge, values, beliefs, and cultural backgrounds into the planning and delivery of care  - Encourage patient/family to participate in care and decision-making at the level they choose  - Honor patient and family perspectives and choices  Outcome: Progressing     Problem: PAIN - ADULT  Goal: Verbalizes/displays adequate comfort level or patient's stated pain goal  Description: INTERVENTIONS:  - Encourage pt to monitor pain and request assistance  - Assess pain using appropriate pain scale  - Administer analgesics based on type and severity of pain and evaluate response  - Implement non-pharmacological measures as appropriate and evaluate response  - Consider cultural and social influences on pain and pain management  - Manage/alleviate anxiety  - Utilize distraction and/or relaxation techniques  - Monitor for opioid side effects  - Notify MD/LIP if interventions unsuccessful or patient reports new pain  - Anticipate increased pain with activity and pre-medicate as appropriate  Outcome: Progressing     Problem: RISK FOR INFECTION - ADULT  Goal: Absence of fever/infection during anticipated neutropenic period  Description: INTERVENTIONS  - Monitor WBC  - Administer growth factors as ordered  - Implement neutropenic guidelines  Outcome: Progressing     Problem: SAFETY ADULT - FALL  Goal: Free from fall injury  Description: INTERVENTIONS:  - Assess pt frequently for physical needs  - Identify cognitive and physical deficits and behaviors that affect risk of falls.  - [place] fall precautions as  indicated by assessment.  - Educate pt/family on patient safety including physical limitations  - Instruct pt to call for assistance with activity based on assessment  - Modify environment to reduce risk of injury  - Provide assistive devices as appropriate  - Consider OT/PT consult to assist with strengthening/mobility  - Encourage toileting schedule  Outcome: Progressing     Problem: DISCHARGE PLANNING  Goal: Discharge to home or other facility with appropriate resources  Description: INTERVENTIONS:  - Identify barriers to discharge w/pt and caregiver  - Include patient/family/discharge partner in discharge planning  - Arrange for needed discharge resources and transportation as appropriate  - Identify discharge learning needs (meds, wound care, etc)  - Arrange for interpreters to assist at discharge as needed  - Consider post-discharge preferences of patient/family/discharge partner  - Complete POLST form as appropriate  - Assess patient's ability to be responsible for managing their own health  - Refer to Case Management Department for coordinating discharge planning if the patient needs post-hospital services based on physician/LIP order or complex needs related to functional status, cognitive ability or social support system  Outcome: Progressing

## 2024-07-17 NOTE — PLAN OF CARE
Patient is AxOx4, on RA, no complain of pain, vitals done, IV saline lock, got an discharge order, called [facility] to give report, report given to RN, removed IV, papers send to facility via [facility], all needs completed in the room, call light within reach.    Problem: Patient Centered Care  Goal: Patient preferences are identified and integrated in the patient's plan of care  Description: Interventions:  - What would you like us to know as we care for you? Home alone  - Provide timely, complete, and accurate information to patient/family  - Incorporate patient and family knowledge, values, beliefs, and cultural backgrounds into the planning and delivery of care  - Encourage patient/family to participate in care and decision-making at the level they choose  - Honor patient and family perspectives and choices  Outcome: Adequate for Discharge     Problem: Patient/Family Goals  Goal: Patient/Family Long Term Goal  Description: Patient's Long Term Goal: Back to home    Interventions:  - Follow healthcare team, treatment plan  -Monitor labs, Vitals & dianostics test  -Pain management.  - Diet recommendation.   -Partipate in therapy.  -Discharge planning    - See additional Care Plan goals for specific interventions  Outcome: Adequate for Discharge  Goal: Patient/Family Short Term Goal  Description: Patient's Short Term Goal: Get stronger    Interventions:   - Monitor labs, vitals and diagnostic tests.  -Pain management, pharmacological interventions  -Diet recommendations  -Adequate oral intake     - See additional Care Plan goals for specific interventions  Outcome: Adequate for Discharge     Problem: PAIN - ADULT  Goal: Verbalizes/displays adequate comfort level or patient's stated pain goal  Description: INTERVENTIONS:  - Encourage pt to monitor pain and request assistance  - Assess pain using appropriate pain scale  - Administer analgesics based on type and severity of pain and evaluate response  -  Implement non-pharmacological measures as appropriate and evaluate response  - Consider cultural and social influences on pain and pain management  - Manage/alleviate anxiety  - Utilize distraction and/or relaxation techniques  - Monitor for opioid side effects  - Notify MD/LIP if interventions unsuccessful or patient reports new pain  - Anticipate increased pain with activity and pre-medicate as appropriate  Outcome: Adequate for Discharge     Problem: RISK FOR INFECTION - ADULT  Goal: Absence of fever/infection during anticipated neutropenic period  Description: INTERVENTIONS  - Monitor WBC  - Administer growth factors as ordered  - Implement neutropenic guidelines  Outcome: Adequate for Discharge     Problem: SAFETY ADULT - FALL  Goal: Free from fall injury  Description: INTERVENTIONS:  - Assess pt frequently for physical needs  - Identify cognitive and physical deficits and behaviors that affect risk of falls.  - [place] fall precautions as indicated by assessment.  - Educate pt/family on patient safety including physical limitations  - Instruct pt to call for assistance with activity based on assessment  - Modify environment to reduce risk of injury  - Provide assistive devices as appropriate  - Consider OT/PT consult to assist with strengthening/mobility  - Encourage toileting schedule  Outcome: Adequate for Discharge     Problem: DISCHARGE PLANNING  Goal: Discharge to home or other facility with appropriate resources  Description: INTERVENTIONS:  - Identify barriers to discharge w/pt and caregiver  - Include patient/family/discharge partner in discharge planning  - Arrange for needed discharge resources and transportation as appropriate  - Identify discharge learning needs (meds, wound care, etc)  - Arrange for interpreters to assist at discharge as needed  - Consider post-discharge preferences of patient/family/discharge partner  - Complete POLST form as appropriate  - Assess patient's ability to be  responsible for managing their own health  - Refer to Case Management Department for coordinating discharge planning if the patient needs post-hospital services based on physician/LIP order or complex needs related to functional status, cognitive ability or social support system  Outcome: Adequate for Discharge

## 2024-07-17 NOTE — CM/SW NOTE
07/17/24 1000   Discharge disposition   Expected discharge disposition subacute   Post Acute Care Provider   ([facility])   Discharge transportation [facility]     SW received confirmation of insurance authorization. ESTRELLA confirmed with Dr. Cardenas and RN Izzy that pt is medically ready for discharge today. ESTRELLA discussed with liaison Augustina from [facility]  to arrange a time for discharge.  ESTRELLA scheduled [facility] for 12:30 PM.  $35 quote- patient agreeable to fees.  RN is aware of discharge time and location and will inform patient/ family. RN to attach IP Transfer Report to After Visit Summary packet for transfer to [facility]. ESTRELLA confirmed PASRR screen was completed. SW/CM to remain available for support and/or discharge planning.     PLAN: DC to [facility] via [facility] at 12:30 PM .  PCS completed.    RN # to report: [phone] room 122    [facility] MSW, LSW [id number]

## 2024-07-18 ENCOUNTER — INITIAL APN SNF VISIT (OUTPATIENT)
Dept: INTERNAL MEDICINE CLINIC | Facility: SKILLED NURSING FACILITY | Age: 79
End: 2024-07-18

## 2024-07-18 VITALS
OXYGEN SATURATION: 100 % | WEIGHT: 122 LBS | RESPIRATION RATE: 18 BRPM | HEART RATE: 65 BPM | TEMPERATURE: 98 F | DIASTOLIC BLOOD PRESSURE: 62 MMHG | SYSTOLIC BLOOD PRESSURE: 141 MMHG | BODY MASS INDEX: 21 KG/M2

## 2024-07-18 DIAGNOSIS — R26.2 INABILITY TO WALK: Primary | ICD-10-CM

## 2024-07-18 DIAGNOSIS — N39.0 URINARY TRACT INFECTION WITHOUT HEMATURIA, SITE UNSPECIFIED: ICD-10-CM

## 2024-07-18 DIAGNOSIS — K59.03 DRUG-INDUCED CONSTIPATION: ICD-10-CM

## 2024-07-18 DIAGNOSIS — R60.9 EDEMA, UNSPECIFIED TYPE: ICD-10-CM

## 2024-07-18 DIAGNOSIS — R52 PAIN: ICD-10-CM

## 2024-07-18 DIAGNOSIS — M54.50 ACUTE LOW BACK PAIN WITHOUT SCIATICA, UNSPECIFIED BACK PAIN LATERALITY: ICD-10-CM

## 2024-07-18 PROCEDURE — 1111F DSCHRG MED/CURRENT MED MERGE: CPT | Performed by: NURSE PRACTITIONER

## 2024-07-18 PROCEDURE — 1126F AMNT PAIN NOTED NONE PRSNT: CPT | Performed by: NURSE PRACTITIONER

## 2024-07-18 PROCEDURE — 1159F MED LIST DOCD IN RCRD: CPT | Performed by: NURSE PRACTITIONER

## 2024-07-18 PROCEDURE — 3078F DIAST BP <80 MM HG: CPT | Performed by: NURSE PRACTITIONER

## 2024-07-18 PROCEDURE — 1160F RVW MEDS BY RX/DR IN RCRD: CPT | Performed by: NURSE PRACTITIONER

## 2024-07-18 PROCEDURE — 99310 SBSQ NF CARE HIGH MDM 45: CPT | Performed by: NURSE PRACTITIONER

## 2024-07-18 PROCEDURE — 1123F ACP DISCUSS/DSCN MKR DOCD: CPT | Performed by: NURSE PRACTITIONER

## 2024-07-18 PROCEDURE — 3077F SYST BP >= 140 MM HG: CPT | Performed by: NURSE PRACTITIONER

## 2024-07-18 NOTE — PROGRESS NOTES
Tess Melendez  : 7/15/1945  Age 79 year old  female patient is admitted to [facility] 24 for rehab and strengthening     Chief complaint: Low back pain difficulty ambulation feeling extremely fatigued and weak.     [facility] admission : 24 to 24     HPI  78-year-old lady who was just recently discharged from the hospital with a few days of stay in the hospital likely from chronic back pain which was postoperative and related to extensive surgeries which are prolonged and requiring long time to recuperate from [facility]. Patient at that admission previously was noted to have no instability of her surgical site following her discharge from the hospital patient did go and meet the operating team and was given a clear and was also told that it would take many weeks prior to her getting complete relief or improvement of her back pain. Patient came  to [facility]  emergency room with complaints of being very weak ,difficulty ambulation and also more so with not having much support at home to stay alone by herself. Patient denies any incontinence ,patient does report that she is ambulating very little which is a better situation than her previous admission. Patient also reports that she is taking oxycodone but it makes her very tired.  Patient was placed in Observation in hospital.  Patient was followed by PT/OT, is motivated to walk but currently unable. had started minimal bedside physical therapy in hospital ,  pain well-controlled, tingling was decreased in the bilateral feet. Overall slight improvement and patient was transferred to [facility] for continued monitoring and therapy. Patient was also treated for UTI when in hospital  completed  IV antibx and oral antibx.     Patient was seen as a new admission . VSS.  Very pleasant and talkative. No reported fever or chills. Patient reports back pain which makes it hard to move , we discussed pain management and pt reports she is afraid  to get addicted but discussed what this would mean and necessary to take the pain meds especially before therapy to help to do more in therapy.  Patient reports she understands . Denies change in bowels or bladder. Hoping to be able to ambulate better so she can go home within a week or not later than 2 weeks. No other new issues or concerns.      Past Medical History:    Endometriosis    Pelvic kidney    left side    Scoliosis     Past Surgical History:   Procedure Laterality Date    Hysterectomy      partial in her 30s for endometriosis    Lumbar spine surgery  06/24/2024    L4-5 MINIMALLY INVASIVE TRANSFORAMINAL LUMBAR INTERBODY FUSION WITH REPAIR OF DUROTOMYCDH    Total hip arthroplasty Right 01/18/2018    Right total hip arthroplasty     Family History   Problem Relation Age of Onset    Heart Attack Mother     Cancer Brother     Diabetes Neg     Hypertension Neg      Social History     Socioeconomic History    Marital status:    Tobacco Use    Smoking status: Never    Smokeless tobacco: Never   Vaping Use    Vaping status: Never Used   Substance and Sexual Activity    Alcohol use: No    Drug use: No     Social Determinants of Health     Food Insecurity: No Food Insecurity (7/12/2024)    Food Insecurity     Food Insecurity: Never true   Transportation Needs: No Transportation Needs (7/12/2024)    Transportation Needs     Lack of Transportation: No    [facility]    Social Connections   Housing Stability: Low Risk  (7/12/2024)    Housing Stability     Housing Instability: No       ALLERGIES:  No Known Allergies    CODE STATUS:  Full Code    ADVANCED CARE PLANNING TEAM: will need family care plan, lives a lone       CURRENT MEDICATIONS - reviewed and updated     Current Outpatient Medications   Medication Sig Dispense Refill    oxyCODONE 5 MG Oral Tab Take 1 tablet (5 mg total) by mouth every 4 (four) hours as needed. 30 tablet 0    bisacodyl  10 MG Rectal Suppos Place 1 suppository (10 mg total) rectally daily as needed. 12 suppository 0    docusate sodium 100 MG Oral Cap Take 100 mg by mouth 2 (two) times daily. 60 capsule 0    magnesium hydroxide 400 MG/5ML Oral Suspension Take 30 mL by mouth daily as needed for constipation. 769 mL 0    polyethylene glycol, PEG 3350, 17 g Oral Powd Pack Take 17 g by mouth daily as needed. 30 each 0    Naloxone HCl 4 MG/0.1ML Nasal Liquid 4 mg by Nasal route as needed. If patient remains unresponsive, repeat dose in other nostril 2-5 minutes after first dose. 1 kit 0    gabapentin 300 MG Oral Cap Take 1 capsule (300 mg total) by mouth 3 (three) times daily. 90 capsule 0    acetaminophen 325 MG Oral Tab Take 1 tablet (325 mg total) by mouth every 6 (six) hours as needed for Pain.         VITALS:  /62   Pulse 65   Temp 98 °F (36.7 °C)   Resp 18   Wt 122 lb (55.3 kg)   SpO2 100%   BMI 20.93 kg/m²      REVIEW OF SYSTEMS:  GENERAL HEALTH:alert and resting in bed   SKIN: denies any unusual skin lesions or rashes  WOUNDS: none   EYES:no visual complaints or deficits  HENT: denies nasal congestion, sinus pain or sore throat;  RESPIRATORY: denies shortness of breath, wheezing or cough   CARDIOVASCULAR:denies chest pain, no palpitations   GI: denies nausea, vomiting, constipation, diarrhea; no rectal bleeding; no heartburn  :no dysuria, urgency or frequency; no vaginal discharge; no urinary incontinence; no hematuria  MUSCULOSKELETAL:difficulty with walking due to back discomfort   NEURO:as above difficulty walking, back pain   PSYCHE: no symptoms of depression or anxiety  HEMATOLOGY:denies hx anemia  ENDOCRINE: denies excessive thirst or urination; denies unexpected wt gain or wt loss  ALLERGY/IMM.: denies food or seasonal allergies      PHYSICAL EXAM:  GENERAL HEALTH: well developed, well nourished, in no apparent distress, resting in bed, reports did participate in therapy eval  LINES, TUBES, DRAINS:   none  SKIN: no rashes, no suspicious lesions  WOUND: none  EYES: PERRLA, EOMI, sclera anicteric, conjunctiva normal; there is no nystagmus, no drainage from eyes  HENT: normocephalic; normal nose, no nasal drainage, mucous membranes pink, moist, pharynx no exudate, no visible cerumen.  NECK: supple; FROM; no JVD, no TMG, no carotid bruits  BREAST: deferred  RESPIRATORY:clear to percussion and auscultation  CARDIOVASCULAR: S1, S2 normal, RRR; no S3, no S4;   ABDOMEN:  normal active BS+, soft, nondistended; no organomegaly, no masses; no bruits; nontender, no guarding, no rebound tenderness.  :no suprapubic distension  LYMPHATIC:no lymphedema  MUSCULOSKELETAL: difficulty with walking due to back pain and recent surgery   EXTREMITIES/VASCULAR:no cyanosis, clubbing or edema  NEUROLOGIC: follows commands  PSYCHIATRIC: alert and oriented x 3; affect appropriate      MEDICAL DECISION MAKING  Capable     DIAGNOSTICS REVIEWED AT THIS VISIT:  Reviewed [facility] records     SEE PLAN BELOW  1 inability to walk  - Patient is postop, back surgery ( fusions)  done at [facility]   - cont on oxycodone 5mg po q 4 prn  for pain relief.   - In the hospital patient did well with ongoing physical therapy.  - - reprots while at home patient does have nursing aides that come and home health helps her but this has been inadequate per pt   -.  Patient is very motivated and could benefit from acute rehab once her pain is adequately controlled per hospital records   Cont gabapentin 300mg po tid   -cont Tylenol 325mg po q 6 prn  -PT/OT   -cpm      2.postoperative pain:  - Patient will be on gabapentin 300mg po tid   - oxycodone 5mg po q 4 prn   - pain service followed in hospital   -cont bowel regimen with dulcolax suppos 10mg q day prn, miralax po q hs prn , colace 100mg po bid , mom po q day prn      3. Nutrition  - Patient was doing well in the hospital, her situation with nutrition was likely secondary to lack of support at home.   -Dietician  to follow pt in rehab      4. Urinary tract infection  - Patient had an abnormal UA urine culture   -treated with IV and oral antibx in hospital, no current oral antibx       5. Edema  - Patient had edema of lower extremities which was very well managed with 5 days of diuretic   -will continue  to monitor and  look for swelling   - will  treat with a diuretic as needed.  But currently not on diuretic   -currently no lower leg edema noted      DVT prophylaxis  - Patient currently has no active calf swelling patient is postop has limited mobility previously patient was not given heparin because she was postoperative during this admission will continue the same     This is a 35 minute visit and greater than 50% of the time was spent counseling the patient and/or coordinating care.    Stephanie Martin, KRISTOPHER  07/18/24   12:30 PM

## 2024-07-22 ENCOUNTER — SNF VISIT (OUTPATIENT)
Dept: INTERNAL MEDICINE CLINIC | Facility: SKILLED NURSING FACILITY | Age: 79
End: 2024-07-22

## 2024-07-22 VITALS
BODY MASS INDEX: 21 KG/M2 | SYSTOLIC BLOOD PRESSURE: 141 MMHG | RESPIRATION RATE: 18 BRPM | DIASTOLIC BLOOD PRESSURE: 82 MMHG | WEIGHT: 122 LBS | TEMPERATURE: 98 F | OXYGEN SATURATION: 98 % | HEART RATE: 65 BPM

## 2024-07-22 DIAGNOSIS — M54.50 ACUTE LOW BACK PAIN WITHOUT SCIATICA, UNSPECIFIED BACK PAIN LATERALITY: ICD-10-CM

## 2024-07-22 DIAGNOSIS — R26.2 DIFFICULTY WALKING: ICD-10-CM

## 2024-07-22 DIAGNOSIS — R60.9 EDEMA, UNSPECIFIED TYPE: ICD-10-CM

## 2024-07-22 DIAGNOSIS — Z75.8 DISCHARGE PLANNING ISSUES: Primary | ICD-10-CM

## 2024-07-22 NOTE — PROGRESS NOTES
Tess Melendez  : 7/15/1945  Age 79 year old  female patient is admitted to   [facility] 24 for rehab and strengthening      Chief complaint: Low back pain difficulty ambulation feeling extremely fatigued and weak.      [facility] admission : 24 to 24      HPI  78-year-old lady who was just recently discharged from the hospital with a few days of stay in the hospital likely from chronic back pain which was postoperative and related to extensive surgeries which are prolonged and requiring long time to recuperate from [facility]. Patient at that admission previously was noted to have no instability of her surgical site following her discharge from the hospital patient did go and meet the operating team and was given a clear and was also told that it would take many weeks prior to her getting complete relief or improvement of her back pain. Patient came  to [facility]  emergency room with complaints of being very weak ,difficulty ambulation and also more so with not having much support at home to stay alone by herself. Patient denies any incontinence ,patient does report that she is ambulating very little which is a better situation than her previous admission. Patient also reports that she is taking oxycodone but it makes her very tired.  Patient was placed in Observation in hospital.  Patient was followed by PT/OT, is motivated to walk but currently unable. had started minimal bedside physical therapy in hospital ,  pain well-controlled, tingling was decreased in the bilateral feet. Overall slight improvement and patient was transferred to [facility] for continued monitoring and therapy. Patient was also treated for UTI when in hospital  completed  IV antibx and oral antibx.      Patient was seen in follow up . VSS.  Very pleasant and talkative. No reported fever or chills. Patient reports back pain which makes it hard to move , we discussed pain management and pt reports  she is taking  the pain meds before therapy to help her to do more in therapy.  Denies change in bowels or bladder. Hoping to be able to ambulate better so she can go home early next  week , she reports she will be ready .  No other new issues or concerns.  She is happy to be on the first floor in a new big room.     ALLERGIES:  No Known Allergies    CODE STATUS:  Full Code    ADVANCED CARE PLANNING TEAM: will need family care plan, lives a lone , hoping early next week if ok to go home     CURRENT MEDICATIONS - reviewed and updated     Current Outpatient Medications   Medication Sig Dispense Refill    oxyCODONE 5 MG Oral Tab Take 1 tablet (5 mg total) by mouth every 4 (four) hours as needed. 30 tablet 0    bisacodyl 10 MG Rectal Suppos Place 1 suppository (10 mg total) rectally daily as needed. 12 suppository 0    docusate sodium 100 MG Oral Cap Take 100 mg by mouth 2 (two) times daily. 60 capsule 0    magnesium hydroxide 400 MG/5ML Oral Suspension Take 30 mL by mouth daily as needed for constipation. 769 mL 0    polyethylene glycol, PEG 3350, 17 g Oral Powd Pack Take 17 g by mouth daily as needed. 30 each 0    Naloxone HCl 4 MG/0.1ML Nasal Liquid 4 mg by Nasal route as needed. If patient remains unresponsive, repeat dose in other nostril 2-5 minutes after first dose. 1 kit 0    gabapentin 300 MG Oral Cap Take 1 capsule (300 mg total) by mouth 3 (three) times daily. 90 capsule 0    acetaminophen 325 MG Oral Tab Take 1 tablet (325 mg total) by mouth every 6 (six) hours as needed for Pain.         VITALS:  /82   Pulse 65   Temp 98 °F (36.7 °C)   Resp 18   Wt 122 lb (55.3 kg)   SpO2 98%   BMI 20.93 kg/m²       REVIEW OF SYSTEMS:  GENERAL HEALTH:alert and resting in bed   SKIN: denies any unusual skin lesions or rashes  WOUNDS: none   EYES:no visual complaints or deficits  HENT: denies nasal congestion, sinus pain or sore throat;  RESPIRATORY: denies shortness of breath, wheezing or cough   CARDIOVASCULAR:denies chest  pain, no palpitations   GI: denies nausea, vomiting, constipation, diarrhea; no rectal bleeding; no heartburn  :no dysuria, urgency or frequency; no vaginal discharge; no urinary incontinence; no hematuria  MUSCULOSKELETAL:difficulty with walking due to back discomfort   NEURO:as above difficulty walking, back pain   PSYCHE: no symptoms of depression or anxiety  HEMATOLOGY:denies hx anemia  ENDOCRINE: denies excessive thirst or urination; denies unexpected wt gain or wt loss  ALLERGY/IMM.: denies food or seasonal allergies        PHYSICAL EXAM:  GENERAL HEALTH: well developed, well nourished, in no apparent distress, resting in bed, reports did participate in therapy eval  LINES, TUBES, DRAINS:  none  SKIN: no rashes, no suspicious lesions  WOUND: none  EYES: PERRLA, EOMI, sclera anicteric, conjunctiva normal; there is no nystagmus, no drainage from eyes  HENT: normocephalic; normal nose, no nasal drainage, mucous membranes pink, moist, pharynx no exudate, no visible cerumen.  NECK: supple; FROM; no JVD, no TMG, no carotid bruits  BREAST: deferred  RESPIRATORY:clear to percussion and auscultation  CARDIOVASCULAR: S1, S2 normal, RRR; no S3, no S4;   ABDOMEN:  normal active BS+, soft, nondistended; no organomegaly, no masses; no bruits; nontender, no guarding, no rebound tenderness.  :no suprapubic distension  LYMPHATIC:no lymphedema  MUSCULOSKELETAL: difficulty with walking due to back pain and recent surgery   EXTREMITIES/VASCULAR:no cyanosis, clubbing or edema  NEUROLOGIC: follows commands  PSYCHIATRIC: alert and oriented x 3; affect appropriate        SEE PLAN BELOW  1 inability to walk- improving   - Patient is postop, back surgery ( fusions)  done at [facility]   - cont on oxycodone 5mg po q 4 prn  for pain relief.   - In the hospital patient did well with ongoing physical therapy.  - - reprots while at home patient does have nursing aides that come and home health helps her but this has been inadequate  per pt   -.  Patient is very motivated and could benefit from acute rehab once her pain is adequately controlled per hospital records but patient report she could most likely be ok maybe by early next week to go home, then would benefit from outpatient PT  Cont gabapentin 300mg po tid   -cont Tylenol 325mg po q 6 prn  -PT/OT   -cpm      2.postoperative pain: IMPROVING  - Patient will be on gabapentin 300mg po tid   - oxycodone 5mg po q 4 prn   - pain service followed in hospital   -cont bowel regimen with dulcolax suppos 10mg q day prn, miralax po q hs prn , colace 100mg po bid , mom po q day prn      3. Nutrition  - Patient was doing well in the hospital, her situation with nutrition was likely secondary to lack of support at home.   -Dietician to follow pt in rehab      4. Urinary tract infection  - Patient had an abnormal UA urine culture   -treated with IV and oral antibx in hospital, no current oral antibx       5. Edema  - Patient had edema of lower extremities which was very well managed with 5 days of diuretic   -will continue  to monitor and  look for swelling   - will  treat with a diuretic as needed.  But currently not on diuretic   -currently no lower leg edema noted      DVT prophylaxis  - Patient currently has no active calf swelling patient is postop has limited mobility previously patient was not given heparin because she was postoperative during this admission will continue the same      Discharge plan  -discussed possibly outpatient PT when dc home    This is a 35 minute visit and greater than 50% of the time was spent counseling the patient and/or coordinating care.    Stephanie Martin, APRN  07/22/24   1:40 PM

## 2024-07-24 ENCOUNTER — NURSE ONLY (OUTPATIENT)
Dept: ENDOCRINOLOGY CLINIC | Facility: CLINIC | Age: 79
End: 2024-07-24

## 2024-07-24 DIAGNOSIS — M80.00XA AGE-RELATED OSTEOPOROSIS WITH CURRENT PATHOLOGICAL FRACTURE, INITIAL ENCOUNTER: Primary | ICD-10-CM

## 2024-07-24 PROCEDURE — 96372 THER/PROPH/DIAG INJ SC/IM: CPT | Performed by: INTERNAL MEDICINE

## 2024-07-24 PROCEDURE — 1111F DSCHRG MED/CURRENT MED MERGE: CPT | Performed by: INTERNAL MEDICINE

## 2024-07-24 NOTE — PROGRESS NOTES
Patient seen today for prolia injection. Injection given to the right upper arm. Patient tolerated well.   used: No  Written medication information sheet provided: Yes    Discussed most common side effects of: bone and muscle pain, joint pain, dizziness, headache. Side effects typically only last up to 1 week.     Call office or be seen in ER with any of the following severe side effects: signs of allergic reaction (hives, difficulty breathing, redness or swelling at injection site, chest pain, shortness of breath), low blood calcium, osteonecrosis of the jaw.    Discussed with the patient if he/she plans on having any major dental work done to make sure their dentist and endocrinology provider is aware that they are taking prolia prior to treatment. This is due to increased risk of osteonecrosis or infection of the jaw while on this medication.     Today this is the 1st injection.  Last prolia injection on: N/A  Summary of benefits completed: Yes  PA required/completed: PA approved. Per TE 5/13/24  Last Prolia protocol labs: 6/11/24, 6/30/24  Last Dexa scan: 10/28/24  Patient taking vitamin D supplementation: Yes  Patient taking calcium supplementation: Yes     Patient advised to schedule 6 month follow up with MD, on or after 01/25/25.   Next labs due: Prior to MD visit   Future labs ordered: Yes. Pt reminded to complete prior to visit with MD. Appointment scheduled.       Staff message placed to MA pool ([facility] Medical Assistant) to perform repeat prolia insurance check in 4 months.

## 2024-07-26 ENCOUNTER — SNF VISIT (OUTPATIENT)
Dept: INTERNAL MEDICINE CLINIC | Facility: SKILLED NURSING FACILITY | Age: 79
End: 2024-07-26

## 2024-07-26 VITALS
RESPIRATION RATE: 18 BRPM | HEART RATE: 72 BPM | TEMPERATURE: 98 F | OXYGEN SATURATION: 100 % | BODY MASS INDEX: 22 KG/M2 | DIASTOLIC BLOOD PRESSURE: 65 MMHG | WEIGHT: 126 LBS | SYSTOLIC BLOOD PRESSURE: 117 MMHG

## 2024-07-26 DIAGNOSIS — R53.1 WEAKNESS: ICD-10-CM

## 2024-07-26 DIAGNOSIS — R60.9 EDEMA, UNSPECIFIED TYPE: ICD-10-CM

## 2024-07-26 DIAGNOSIS — M54.50 ACUTE LOW BACK PAIN WITHOUT SCIATICA, UNSPECIFIED BACK PAIN LATERALITY: Primary | ICD-10-CM

## 2024-07-26 DIAGNOSIS — R52 PAIN: ICD-10-CM

## 2024-07-26 DIAGNOSIS — R26.2 DIFFICULTY WALKING: ICD-10-CM

## 2024-07-26 DIAGNOSIS — N39.0 URINARY TRACT INFECTION WITHOUT HEMATURIA, SITE UNSPECIFIED: ICD-10-CM

## 2024-07-26 PROCEDURE — 1160F RVW MEDS BY RX/DR IN RCRD: CPT | Performed by: NURSE PRACTITIONER

## 2024-07-26 PROCEDURE — 99308 SBSQ NF CARE LOW MDM 20: CPT | Performed by: NURSE PRACTITIONER

## 2024-07-26 PROCEDURE — 3074F SYST BP LT 130 MM HG: CPT | Performed by: NURSE PRACTITIONER

## 2024-07-26 PROCEDURE — 1111F DSCHRG MED/CURRENT MED MERGE: CPT | Performed by: NURSE PRACTITIONER

## 2024-07-26 PROCEDURE — 1159F MED LIST DOCD IN RCRD: CPT | Performed by: NURSE PRACTITIONER

## 2024-07-26 PROCEDURE — 3078F DIAST BP <80 MM HG: CPT | Performed by: NURSE PRACTITIONER

## 2024-07-26 NOTE — PROGRESS NOTES
Tess Melendez  : 7/15/1945  Age 79 year old  female patient is admitted to   [facility] 24 for rehab and strengthening      Chief complaint: Low back pain difficulty ambulation feeling extremely fatigued and weak.      [facility] admission : 24 to 24      HPI  78-year-old lady who was just recently discharged from the hospital with a few days of stay in the hospital likely from chronic back pain which was postoperative and related to extensive surgeries which are prolonged and requiring long time to recuperate from [facility]. Patient at that admission previously was noted to have no instability of her surgical site following her discharge from the hospital patient did go and meet the operating team and was given a clear and was also told that it would take many weeks prior to her getting complete relief or improvement of her back pain. Patient came  to [facility]  emergency room with complaints of being very weak ,difficulty ambulation and also more so with not having much support at home to stay alone by herself. Patient denies any incontinence ,patient does report that she is ambulating very little which is a better situation than her previous admission. Patient also reports that she is taking oxycodone but it makes her very tired.  Patient was placed in Observation in hospital.  Patient was followed by PT/OT, is motivated to walk but currently unable. had started minimal bedside physical therapy in hospital ,  pain well-controlled, tingling was decreased in the bilateral feet. Overall slight improvement and patient was transferred to [facility] for continued monitoring and therapy. Patient was also treated for UTI when in hospital  completed  IV antibx and oral antibx.      Patient seen for aprn follow up visit. She was seen ambulating in the hernandez with her walker. She reports she is doing well without new complaints. Does state she has mild back pain that is managed with her  scheduled gabapentin. Appetite and sleep is good. Denies fatigue, body aches, chills, CP, SOB, abdominal pain, nausea, vomiting, urinary or bowel complaints.      ALLERGIES:  No Known Allergies    CODE STATUS:  Full Code    ADVANCED CARE PLANNING TEAM: will need family care plan, lives a lone, discharge home with HH    CURRENT MEDICATIONS - reviewed and updated     Current Outpatient Medications   Medication Sig Dispense Refill    oxyCODONE 5 MG Oral Tab Take 1 tablet (5 mg total) by mouth every 4 (four) hours as needed. 30 tablet 0    bisacodyl 10 MG Rectal Suppos Place 1 suppository (10 mg total) rectally daily as needed. 12 suppository 0    docusate sodium 100 MG Oral Cap Take 100 mg by mouth 2 (two) times daily. 60 capsule 0    magnesium hydroxide 400 MG/5ML Oral Suspension Take 30 mL by mouth daily as needed for constipation. 769 mL 0    polyethylene glycol, PEG 3350, 17 g Oral Powd Pack Take 17 g by mouth daily as needed. 30 each 0    Naloxone HCl 4 MG/0.1ML Nasal Liquid 4 mg by Nasal route as needed. If patient remains unresponsive, repeat dose in other nostril 2-5 minutes after first dose. 1 kit 0    gabapentin 300 MG Oral Cap Take 1 capsule (300 mg total) by mouth 3 (three) times daily. 90 capsule 0    acetaminophen 325 MG Oral Tab Take 1 tablet (325 mg total) by mouth every 6 (six) hours as needed for Pain.         VITALS:  /65   Pulse 72   Temp 98 °F (36.7 °C)   Resp 18   Wt 126 lb (57.2 kg)   SpO2 100%   BMI 21.62 kg/m²       SUBJECTIVE  Patient seen for aprn follow up visit. She was seen ambulating in the hernandez with her walker. She reports she is doing well without new complaints. Does state she has mild back pain that is managed with her scheduled gabapentin. Appetite and sleep is good. Denies fatigue, body aches, chills, CP, SOB, abdominal pain, nausea, vomiting, urinary or bowel complaints.         PHYSICAL EXAM:  GENERAL HEALTH: well developed, well nourished, in no apparent distress,    LINES, TUBES, DRAINS:  none  SKIN: no rashes, no suspicious lesions  WOUND: none  EYES: PERRLA, EOMI, sclera anicteric, conjunctiva normal; there is no nystagmus, no drainage from eyes  HENT: normocephalic; normal nose, no nasal drainage, mucous membranes pink, moist, pharynx no exudate, no visible cerumen.  NECK: supple; FROM; no JVD, no TMG, no carotid bruits  BREAST: deferred  RESPIRATORY:clear to percussion and auscultation  CARDIOVASCULAR: S1, S2 normal, RRR; no S3, no S4;   ABDOMEN:  normal active BS+, soft, nondistended; no organomegaly, no masses; no bruits; nontender, no guarding, no rebound tenderness.  :no suprapubic distension  LYMPHATIC:no lymphedema  MUSCULOSKELETAL: limited movement d/t back brace. No acute synovitis to upper or lower extremities.  EXTREMITIES/VASCULAR: no cyanosis, clubbing or edema. Radial pulses 2+  NEUROLOGIC: follows commands, sensation intact   PSYCHIATRIC: alert and oriented x 3; affect appropriate        SEE PLAN BELOW    Inability to walk- improving   - patient is postop, back surgery (fusions) done at [facility]   - continue oxycodone 5mg po q 4 prn  for pain relief.   - in the hospital patient did well with ongoing physical therapy.  - - reprots while at home patient does have nursing aides that come and home health helps her but this has been inadequate per pt   - patient is very motivated and could benefit from acute rehab once her pain is adequately controlled per hospital records but patient report she could most likely be ok maybe by early next week to go home, then would benefit from outpatient PT  - continue gabapentin 300mg po tid   - continue tylenol 325mg po q 6 prn  - PT/OT     Postoperative pain - improving   - continue gabapentin 300mg po tid   - continue oxycodone 5mg po q 4 prn   - pain service followed in hospital   - cont bowel regimen with dulcolax suppos 10mg q day prn, miralax po q hs prn , colace 100mg po bid , mom po q day prn      Nutrition  -  patient was doing well in the hospital, her situation with nutrition was likely secondary to lack of support at home  - good appetite now while in rehab   - dietician to follow pt in rehab      Urinary tract infection - resolved   - patient had an abnormal UA urine culture   - s/pIV and oral antibx in hospital, no current oral antibx       Edema - resolved   - Patient had edema of lower extremities which was very well managed with 5 days of diuretic   - currently on no diuretics. will treat with a diuretic prn  - no LE edema     DVT prophylaxis  - Patient currently has no active calf swelling patient is postop has limited mobility previously patient was not given heparin because she was postoperative during this admission will continue the same      Discharge plan  - discharge home with HHC   - discussed possibly outpatient PT when dc home    This is a 25 minute visit and greater than 50% of the time was spent counseling the patient and/or coordinating care.    Vianney Nathan APRN  7/26/24

## 2024-07-29 ENCOUNTER — SNF DISCHARGE (OUTPATIENT)
Dept: INTERNAL MEDICINE CLINIC | Facility: SKILLED NURSING FACILITY | Age: 79
End: 2024-07-29

## 2024-07-29 DIAGNOSIS — R53.81 PHYSICAL DECONDITIONING: ICD-10-CM

## 2024-07-29 DIAGNOSIS — M80.00XA AGE-RELATED OSTEOPOROSIS WITH CURRENT PATHOLOGICAL FRACTURE, INITIAL ENCOUNTER: ICD-10-CM

## 2024-07-29 DIAGNOSIS — R52 INADEQUATE PAIN CONTROL: ICD-10-CM

## 2024-07-29 DIAGNOSIS — M19.90 OSTEOARTHRITIS, UNSPECIFIED OSTEOARTHRITIS TYPE, UNSPECIFIED SITE: ICD-10-CM

## 2024-07-29 DIAGNOSIS — N39.0 URINARY TRACT INFECTION WITHOUT HEMATURIA, SITE UNSPECIFIED: ICD-10-CM

## 2024-07-29 DIAGNOSIS — M54.16 LUMBAR RADICULOPATHY: ICD-10-CM

## 2024-07-29 DIAGNOSIS — R26.2 INABILITY TO WALK: Primary | ICD-10-CM

## 2024-07-29 DIAGNOSIS — M16.11 PRIMARY OSTEOARTHRITIS OF RIGHT HIP: ICD-10-CM

## 2024-07-29 NOTE — PROGRESS NOTES
Tess Melendez, 7/15/1945, 79 year old, female is being discharged from [facility] to home with hh      DISCHARGE SUMMARY    Date of Admission to [facility]: 7/17/24     Date of Discharge from [facility]: 07/29/24                                 Admitting Diagnoses: Low back pain difficulty ambulation feeling extremely fatigued and weak     Reason for Admission to [facility]: Rehabilitation and strengthening      PHYSICAL EXAM:    GENERAL HEALTH: well developed, well nourished, in no apparent distress,   LINES, TUBES, DRAINS:  none  SKIN: no rashes, no suspicious lesions  WOUND: none  EYES: PERRLA, EOMI, sclera anicteric, conjunctiva normal; there is no nystagmus, no drainage from eyes  HENT: normocephalic; normal nose, no nasal drainage, mucous membranes pink, moist, pharynx no exudate, no visible cerumen.  NECK: supple; FROM; no JVD, no TMG, no carotid bruits  BREAST: deferred  RESPIRATORY:clear to percussion and auscultation  CARDIOVASCULAR: S1, S2 normal, RRR; no S3, no S4;   ABDOMEN:  normal active BS+, soft, nondistended; no organomegaly, no masses; no bruits; nontender, no guarding, no rebound tenderness.  :no suprapubic distension  LYMPHATIC:no lymphedema  MUSCULOSKELETAL: limited movement d/t back brace. No acute synovitis to upper or lower extremities.  EXTREMITIES/VASCULAR: no cyanosis, clubbing or edema. Radial pulses 2+  NEUROLOGIC: follows commands, sensation intact   PSYCHIATRIC: alert and oriented x 3; affect appropriate    CURRENT MANAGEMENT AT [facility]    Inability to walk- improving   - patient is postop, back surgery (fusions) done at [facility]   - continue oxycodone 5mg po q 4 prn  for pain relief.   - in the hospital patient did well with ongoing physical therapy.  - - reprots while at home patient does have nursing aides that come and home health helps her but this has been inadequate per pt   - patient is very motivated and could benefit from acute rehab once her pain is  adequately controlled per hospital records but patient report she could most likely be ok maybe by early next week to go home, then would benefit from outpatient PT  - continue gabapentin 300mg po tid   - continue tylenol 325mg po q 6 prn  - PT/OT   - Plan for DC today home with       Postoperative pain - improving   - continue gabapentin 300mg po tid   - continue oxycodone 5mg po q 4 prn   - pain service followed in hospital   - cont bowel regimen with dulcolax suppos 10mg q day prn, miralax po q hs prn , colace 100mg po bid , mom po q day prn      Nutrition  - patient was doing well in the hospital, her situation with nutrition was likely secondary to lack of support at home  - good appetite now while in rehab   - dietician followed pt in rehab   - follow up with pcp within week of [facility]ER discharge      Urinary tract infection - resolved   - patient had an abnormal UA urine culture   - s/pIV and oral antibx in hospital, no current oral antibx    - follow up with pcp within week of [facility] discharge      Edema - resolved   - Patient had edema of lower extremities which was very well managed with 5 days of diuretic   - currently on no diuretics. will treat with a diuretic prn  - no LE edema      DVT prophylaxis  - Patient currently has no active calf swelling patient is postop has limited mobility previously patient was not given heparin because she was postoperative during this admission will continue the same      Discharge plan  - discharge home with [facility]     Medication Reconciliation Completed:  Yes - All medications and side effects reviewed with patient    FOLLOW UP APPOINTMENTS  Future Appointments   Date Time Provider Department Center   1/28/2025  9:45 AM Annamarie Parrish MD [facility]      60 minutes spent w/ patient and staff, including but not limited to reviewing present status, needs, abilities with disciplines, reviewing medical records, vital signs, labs, completing med reconciliation and  entering orders for continued care in [facility]  and at discharge     Note to patient: The 21st Century Cures Act makes medical notes like these available to patients in the interest of transparency. However, this is a medical document intended as peer to peer communication. It is written in medical language and may contain abbreviations or verbiage that are unfamiliar. It may appear blunt or direct. Medical documents are intended to carry relevant information, facts as evident, and the clinical opinion of the practitioner who signs the document.     Archana Nicole, APRN   7/29/2024

## 2024-08-23 NOTE — DISCHARGE SUMMARY
[facility]  part of [facility]    Discharge Summary    Tess Melendez Patient Status:  Observation    7/15/1945 MRN [id number]   Location [facility] 5SW/SE Attending No att. providers found   Hosp Day # 0 PCP MARCIA NAVARRO     Date of Admission: 2024   Date of Discharge: 2024    Admitting Diagnosis: Inability to walk [R26.2]    Disposition: Transfer to Skilled Nursing Facility:      Discharge Diagnosis: .Principal Problem:    Inability to walk      Hospital Course:   Reason for Admission: Inability to walk    Discharge Physical Exam:  Vital Signs:  Blood pressure 120/79, pulse 78, temperature 98.3 °F (36.8 °C), temperature source Oral, resp. rate 16, weight 122 lb 6.4 oz (55.5 kg), SpO2 98%.     General: No acute distress. Alert and oriented x 3.  HEENT: Moist mucous membranes. EOM-I. PERRL  Neck: No lymphadenopathy.  No JVD. No carotid bruits.  Respiratory: Clear to auscultation bilaterally.  No wheezes. No rhonchi.  Cardiovascular: S1, S2.  Regular rate and rhythm.  No murmurs. Equal pulses   Abdomen: Soft, nontender, nondistended.  Positive bowel sounds. No rebound tenderness  Neurologic: No focal neurological deficits.  Musculoskeletal: Full range of motion of all extremities.  No swelling noted.  Integument: No lesions. No erythema.  Psychiatric: Appropriate mood and affect.    Hospital Course:      problem #1 inability to walk: Patient is postop and on oxycodone for pain relief.  In the hospital patient did well with ongoing physical therapy.  While at home patient does have nursing aides that come and home health helps her but this has been inadequate.  Patient is very motivated and could benefit from acute rehab once her pain is adequately controlled.     Problem #2 postoperative pain: Patient will be on gabapentin and oxycodone pain service consult has been requested.     Nutrition: Patient was doing well in the hospital her situation with nutrition was likely  secondary to lack of support at home.  Will add nutritional supplement and will continue on regular diet.     Urinary tract infection: Patient has an abnormal UA urine culture is pending in light of patient developing weakness which is much more than her discharge time I will treat this patient for UTI empirically and await culture results.     Edema: Patient had edema of lower extremities which was very well managed with 5 days of outpatient loop diuretic will continue managing the patient and look for swelling and treat with a loop diuretic as needed.     DVT prophylaxis: Patient currently has no active calf swelling patient is postop has limited mobility previously patient was not given heparin because she was postoperative during this admission will continue the same and will place SCD boots.     GI prophylaxis: Patient will be on PPI.     July 13, 2024  Patient seems to be stable urine analysis is suggestive of UTI.  Started on empiric ceftriaxone urine culture is pending  Urology consult noted further workup including MRI is pending.  Pain service recommendations noted patient will continue on oral gabapentin and oxycodone.  OT and PT is ordered and pending evaluation.     July 14, 2024  Patient is stable reports less back pain but does report tingling in both her feet also reports tingling in her back.  No open wound or ulcer is noted.  Discussed at length with patient to continue taking gabapentin consistently and take oxycodone when needed for pain.  In addition patient will also be evaluated by Occupational Therapy and physical therapy plan is most likely to send to subacute rehab.  Discussed with Dr. Centeno the MRI findings.  Patient does not have any other etiology explaining her back pain and weakness.  OT PT will be ongoing.  Continue IV antibiotics for the time being.  Appreciate pain service recommendations.  Discharge when patient is stable and able to go to subacute rehab.     July 15,  2024  Patient doing well still not able to walk and is bedbound physical therapy evaluation is pending.  Pain service recommendation noted and continue titrating pain medicines for adequate control.  Plan for discharge to subacute rehab.     July 16, 2024  Patient has started minimal bedside physical therapy pain is well-controlled tingling is decreased in the bilateral feet.  Patient is awaiting acceptance at rehab places requests have been sent out.  Continue physical therapy while in hospital.  Overall slight improvement  Complications: none    Consultants         Provider   Role Specialty     Yazan Centeno MD      Consulting Physician NEUROLOGY                Discharge Plan:   Discharge Condition: Stable    Discharge Medication List as of 7/17/2024 11:48 AM        New Orders    Details   bisacodyl 10 MG Rectal Suppos Place 1 suppository (10 mg total) rectally daily as needed., Normal, Disp-12 suppository, R-0      docusate sodium 100 MG Oral Cap Take 100 mg by mouth 2 (two) times daily., Normal, Disp-60 capsule, R-0      magnesium hydroxide 400 MG/5ML Oral Suspension Take 30 mL by mouth daily as needed for constipation., Normal, Disp-769 mL, R-0      polyethylene glycol, PEG 3350, 17 g Oral Powd Pack Take 17 g by mouth daily as needed., Normal, Disp-30 each, R-0      Naloxone HCl 4 MG/0.1ML Nasal Liquid 4 mg by Nasal route as needed. If patient remains unresponsive, repeat dose in other nostril 2-5 minutes after first dose., Normal, Disp-1 kit, R-0           Home Meds - Modified    Details   oxyCODONE 5 MG Oral Tab Take 1 tablet (5 mg total) by mouth every 4 (four) hours as needed., Print Script, Disp-30 tablet, R-0           Home Meds - Unchanged    Details   gabapentin 300 MG Oral Cap Take 1 capsule (300 mg total) by mouth 3 (three) times daily., Normal, Disp-90 capsule, R-0      acetaminophen 325 MG Oral Tab Take 1 tablet (325 mg total) by mouth every 6 (six) hours as needed for Pain., Historical                  Discharge Diet: As tolerated    Discharge Activity: As tolerated    Follow up:      Follow-up Information       Santi Vazquez. Schedule an appointment as soon as possible for a visit in 1 week(s).    Specialty: Internal Medicine  Contact information:  [address] [facility] [address] [phone]                             Follow up Labs: none       FRANCIS PEREIRA MD   7/23/2024

## 2024-10-23 ENCOUNTER — APPOINTMENT (OUTPATIENT)
Dept: GENERAL RADIOLOGY | Facility: HOSPITAL | Age: 79
End: 2024-10-23
Attending: EMERGENCY MEDICINE
Payer: MEDICARE

## 2024-10-23 ENCOUNTER — HOSPITAL ENCOUNTER (EMERGENCY)
Facility: HOSPITAL | Age: 79
Discharge: HOME | End: 2024-10-23
Attending: EMERGENCY MEDICINE
Payer: MEDICARE

## 2024-10-23 VITALS
HEART RATE: 59 BPM | WEIGHT: 122 LBS | TEMPERATURE: 98 F | SYSTOLIC BLOOD PRESSURE: 149 MMHG | DIASTOLIC BLOOD PRESSURE: 68 MMHG | HEIGHT: 63 IN | RESPIRATION RATE: 18 BRPM | BODY MASS INDEX: 21.62 KG/M2 | OXYGEN SATURATION: 98 %

## 2024-10-23 DIAGNOSIS — S39.012A STRAIN OF LUMBAR REGION, INITIAL ENCOUNTER: Primary | ICD-10-CM

## 2024-10-23 PROCEDURE — 72100 X-RAY EXAM L-S SPINE 2/3 VWS: CPT | Performed by: EMERGENCY MEDICINE

## 2024-10-23 PROCEDURE — 73630 X-RAY EXAM OF FOOT: CPT | Performed by: EMERGENCY MEDICINE

## 2024-10-23 PROCEDURE — 99283 EMERGENCY DEPT VISIT LOW MDM: CPT

## 2024-10-23 PROCEDURE — 99284 EMERGENCY DEPT VISIT MOD MDM: CPT

## 2024-10-23 NOTE — ED PROVIDER NOTES
Patient Seen in: [facility] Emergency Department    History     Chief Complaint   Patient presents with    Fall    Pain       HPI    79-year-old female presents to the ED for evaluation of left lower back pain along with tingling sensation in her right foot.  Patient states that she fell 2 weeks ago and has been having some discomfort in her left lower back since then.  The tingling sensation in her right foot is more chronic.  She denies any bowel or bladder incontinence.    History from Independent Source:       External Records Reviewed: On chart review, patient has prior history of scoliosis and has had thoracic and lumbar fusions completed.  Patient was last seen by spine surgeon in September of this year.    History reviewed.   Past Medical History:    Endometriosis    Pelvic kidney    left side    Scoliosis       History reviewed.   Past Surgical History:   Procedure Laterality Date    Hysterectomy      partial in her 30s for endometriosis    Lumbar spine surgery  06/24/2024    L4-5 MINIMALLY INVASIVE TRANSFORAMINAL LUMBAR INTERBODY FUSION WITH REPAIR OF DUROTOMYCDH    Total hip arthroplasty Right 01/18/2018    Right total hip arthroplasty         Medications :  Prescriptions Prior to Admission[1]     Family History   Problem Relation Age of Onset    Heart Attack Mother     Cancer Brother     Diabetes Neg     Hypertension Neg        Smoking Status:   Social History     Socioeconomic History    Marital status:    Tobacco Use    Smoking status: Never    Smokeless tobacco: Never   Vaping Use    Vaping status: Never Used   Substance and Sexual Activity    Alcohol use: No    Drug use: No       Constitutional and vital signs reviewed.      Social History and Family History elements reviewed from today, pertinent positives to the presenting problem noted.    Physical Exam     ED Triage Vitals [10/23/24 1149]   /68   Pulse 77   Resp 20   Temp 98 °F (36.7 °C)   Temp src    SpO2 100 %   O2 Device  None (Room air)       Physical Exam   Constitutional: AAOx3, well nourished, NAD  HEENT: Normocephalic, PERRLA, MMM  CV: s1s2+, RRR, no m/r/g, normal distal pulses  Pulmonary/Chest: CTA b/l with no rales, wheezes.  No chest wall tenderness  Abdominal: Nontender.  Nondistended. Soft. Bowel sounds are normal.   Neck/Back: Left lumbar paraspinal tenderness with no midline tenderness percussion  :   Musculoskeletal: Normal range of motion. No deformity.   Neurological: Awake, alert. Normal reflexes. No cranial nerve deficit.    Skin: Skin is warm and dry. No rash noted. No erythema.   Psychiatric:      All measures to prevent infection transmission during my interaction with the patient were taken. The patient was already wearing a droplet mask on my arrival to the room. Personal protective equipment was worn throughout the duration of the exam.      ED Course      Labs Reviewed - No data to display  My Independent Interpretation of EKG (if performed):     Imaging Results Available and Reviewed while in ED: XR LUMBAR SPINE (MIN 2 VIEWS) (CPT=72100)    Result Date: 10/23/2024  CONCLUSION:  1. Lumbar discectomy interbody fusion posterior instrumentation L4-5.  2. Marked dextroscoliosis dorsal lumbar junction.  Multilevel spondylosis. 3. No acute fracture or acute malalignment.    Dictated by (CST): Francisco French MD on 10/23/2024 at 3:07 PM     Finalized by (CST): Francisco French MD on 10/23/2024 at 3:15 PM          XR FOOT, COMPLETE (MIN 3 VIEWS), RIGHT (CPT=73630)    Result Date: 10/23/2024  CONCLUSION:  1. No acute fracture or dislocation. 2. 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 3. Postsurgical changes involving the 5th digit described above. 1.   Dictated by (CST): Francisco French MD on 10/23/2024 at 3:04 PM     Finalized by (CST): Francisco French MD on 10/23/2024 at 3:06 PM         ED Medications Administered: Medications - No data to display          MDM     Vitals:    10/23/24 1149   BP:  149/68   Pulse: 77   Resp: 20   Temp: 98 °F (36.7 °C)   SpO2: 100%   Weight: 55.3 kg   Height: 160 cm (5' 3\")     *I personally reviewed and interpreted all ED vitals.    Independent Interpretation of Studies: I have independently reviewed patient's lumbar x-ray and patient has prior L4-L5 fusion and discectomy.  Patient has scoliosis changes and arthritic changes    Social Determinants of Health:     Procedures:      Differential/MDM/Shared Decision Making: Differential Diagnosis includes fracture, muscular pain, peripheral neuropathy, others.      The patient already  has a past medical history of Endometriosis, Pelvic kidney, and Scoliosis.  to contribute to the complexity of this ED evaluation.           Medications, Diagnostics, or Disposition considered but not done:     Management of case was discussed with patient and I believe she is experiencing discomfort from muscular strain.  She is comfortable with discharge.  Patient's right foot likely has peripheral neuropathy discomfort and patient is already on gabapentin for that.      Condition upon leaving the department: Stable    Disposition and Plan     Clinical Impression:  1. Strain of lumbar region, initial encounter        Disposition:  Discharge    Follow-up:  Santi Vazquez  [address] [facility] [address] [phone]    Call in 2 day(s)        Medications Prescribed:  Current Discharge Medication List                   [1] (Not in a hospital admission)

## 2024-10-23 NOTE — ED INITIAL ASSESSMENT (HPI)
Patient presents to ER with complaints of left rib/back pain and right foot pain post fall x 2 weeks ago.   Patient admits she had mechanical fall, and states pain has been worsening.   Patient has hx of back surgery in June.

## 2024-11-01 ENCOUNTER — TELEPHONE (OUTPATIENT)
Dept: ENDOCRINOLOGY CLINIC | Facility: CLINIC | Age: 79
End: 2024-11-01

## 2024-11-01 DIAGNOSIS — M80.00XA AGE-RELATED OSTEOPOROSIS WITH CURRENT PATHOLOGICAL FRACTURE, INITIAL ENCOUNTER: Primary | ICD-10-CM

## 2024-11-01 DIAGNOSIS — E55.9 VITAMIN D DEFICIENCY: ICD-10-CM

## 2024-11-01 NOTE — TELEPHONE ENCOUNTER
Dr. Parrish --     Dr. Richmond, please advise on behalf of Dr. Jensen --    Spoke to pt. Pt states for the past 2-3 months, she's had left arm pain. Mostly near her elbow and states it feels lumpy? It hurts more when she lays down. Has been using bengay, tylenol and heating pad to help treat.     Pt did go to ED on 10/23 for fall and left lower back pain. Pt had fallen 2 weeks prior. Was discharged home.     Pt is concerned these are long term side effects from prior receiving evenity and now gets prolia.     Last injection on 7/24, next visit is 1/28/25.

## 2024-11-01 NOTE — TELEPHONE ENCOUNTER
Sorry to hear about that   I am not sure if this is related to bone medication, but should be looked at   In person evaluation is recommended  Recommend that she please schedule apt with PCP   Any new symptoms/ acute pain --> please go to the immediate care/ ER    Dr JIMENEZ can comment more on her return in case she wants to see the patient sooner  CHEPE JIMENEZ     Thanks

## 2024-11-05 ENCOUNTER — TELEPHONE (OUTPATIENT)
Dept: ENDOCRINOLOGY CLINIC | Facility: CLINIC | Age: 79
End: 2024-11-05

## 2024-11-05 NOTE — TELEPHONE ENCOUNTER
Received fax from [employer] in regards to Prolia. Medication has been approved from 01/01/25 to 12/31/2025. Letter sent to scanning. Patient has an appointment scheduled for Prolia 1/28/25.

## 2024-11-07 NOTE — TELEPHONE ENCOUNTER
Attempted to reach pt. VM box is full.     Called pt daughter, LISA on file. States she will try to get ahold of pt and advise her to call us back.

## 2024-11-07 NOTE — TELEPHONE ENCOUNTER
Hi!  Please let patient know that I also do not think that this pain is due to the Prolia shot.     It is possible that this is due to her fall. I would also like us to look into the cause behind her 2 falls. Can we find out the reason for this? Thank you!

## 2024-11-08 ENCOUNTER — HOSPITAL ENCOUNTER (OUTPATIENT)
Dept: GENERAL RADIOLOGY | Facility: HOSPITAL | Age: 79
Discharge: HOME | End: 2024-11-08
Attending: INTERNAL MEDICINE
Payer: MEDICARE

## 2024-11-08 ENCOUNTER — APPOINTMENT (OUTPATIENT)
Dept: LAB | Facility: HOSPITAL | Age: 79
End: 2024-11-08
Attending: INTERNAL MEDICINE
Payer: MEDICARE

## 2024-11-08 DIAGNOSIS — R07.9 CHEST PAIN: ICD-10-CM

## 2024-11-08 DIAGNOSIS — R07.9 CHEST PAIN: Primary | ICD-10-CM

## 2024-11-08 LAB
ATRIAL RATE: 66 BPM
P AXIS: 95 DEGREES
P-R INTERVAL: 106 MS
Q-T INTERVAL: 434 MS
QRS DURATION: 68 MS
QTC CALCULATION (BEZET): 454 MS
R AXIS: 74 DEGREES
T AXIS: 86 DEGREES
VENTRICULAR RATE: 66 BPM

## 2024-11-08 PROCEDURE — 93005 ELECTROCARDIOGRAM TRACING: CPT

## 2024-11-08 PROCEDURE — 93010 ELECTROCARDIOGRAM REPORT: CPT | Performed by: INTERNAL MEDICINE

## 2024-11-08 PROCEDURE — 71046 X-RAY EXAM CHEST 2 VIEWS: CPT | Performed by: INTERNAL MEDICINE

## 2024-11-12 ENCOUNTER — TELEPHONE (OUTPATIENT)
Dept: ENDOCRINOLOGY CLINIC | Facility: CLINIC | Age: 79
End: 2024-11-12

## 2024-11-12 NOTE — TELEPHONE ENCOUNTER
Patient states she is experiencing pain from where she normally gets Evenity injections on left upper part of her arm.  Please call.  Thank you.

## 2024-11-12 NOTE — TELEPHONE ENCOUNTER
Dr. Parrish,     Pt states she was in front of her house, pulling her garbage can when she stumped her right toe, lost balance and fell. She landed on her right side. Pt states this is her first and only fall. Pt noted bruising on right hand where she landed.     Pt went to [facility] for evaluation, no fractures noted.     Pt reports tingling in upper part of left arm.  Pt saw her PCP and was notified that everything was normal on the left side. Pt has been taking Tylenol for pain in the PM 1 500 mg tablet prn and creams to ease pain. Please advise on any recommendations. Pt states she wants someone to assess her left arm in person.

## 2024-11-26 ENCOUNTER — HOSPITAL ENCOUNTER (OUTPATIENT)
Dept: CV DIAGNOSTICS | Facility: HOSPITAL | Age: 79
Discharge: HOME | End: 2024-11-26
Attending: INTERNAL MEDICINE
Payer: MEDICARE

## 2024-11-26 PROCEDURE — 93306 TTE W/DOPPLER COMPLETE: CPT | Performed by: INTERNAL MEDICINE

## 2024-12-02 NOTE — TELEPHONE ENCOUNTER
Hi!    Can we please contact patient to see how she is doing now and if the numbness and tingling has gotten better? Thank you!

## 2024-12-04 NOTE — TELEPHONE ENCOUNTER
Dr. Parrish,  There are no open spots prior to patient's scheduled apt on 1/28/25  Please advise if patient should be scheduled as overbook with a thyroid/PCOS consult or keep apt on 1/28/25 - thanks

## 2024-12-04 NOTE — TELEPHONE ENCOUNTER
Offered appointment for 12/11. Patient agreeable. Appointment scheduled. Reminded patient to have labwork completed prior to appointment. Patient plans to have labwork on 12/6.    Scheduled NV for 1/28 for Prolia injection.

## 2024-12-04 NOTE — TELEPHONE ENCOUNTER
Dr. Parrish,     Patient still reports tingling constantly on the left upper part of the arm. Patient states that she feels that the area is \"lumpy.\" Patient denies any redness or discoloration in the arm. Patient is requesting to be seen in person since she spoke with PCP and was referred to Endo for assessment. Please advise, thank you!

## 2024-12-04 NOTE — TELEPHONE ENCOUNTER
Hi!    Please try and fit her in before the end of the year, and then she can have the Prolia shot as a NV. Remind her to have labs completed before coming to see me. Thank you!

## 2024-12-07 ENCOUNTER — LAB ENCOUNTER (OUTPATIENT)
Dept: LAB | Facility: HOSPITAL | Age: 79
End: 2024-12-07
Attending: OBSTETRICS & GYNECOLOGY
Payer: MEDICARE

## 2024-12-07 DIAGNOSIS — M80.00XA AGE-RELATED OSTEOPOROSIS WITH CURRENT PATHOLOGICAL FRACTURE, INITIAL ENCOUNTER: ICD-10-CM

## 2024-12-07 DIAGNOSIS — E55.9 VITAMIN D DEFICIENCY: ICD-10-CM

## 2024-12-07 LAB
ALBUMIN SERPL-MCNC: 4.6 G/DL (ref 3.2–4.8)
ALBUMIN/GLOB SERPL: 1.9 {RATIO} (ref 1–2)
ALP LIVER SERPL-CCNC: 51 U/L
ALT SERPL-CCNC: 17 U/L
ANION GAP SERPL CALC-SCNC: 7 MMOL/L (ref 0–18)
AST SERPL-CCNC: 29 U/L (ref ?–34)
BILIRUB SERPL-MCNC: 0.5 MG/DL (ref 0.2–1.1)
BUN BLD-MCNC: 16 MG/DL (ref 9–23)
BUN/CREAT SERPL: 18.8 (ref 10–20)
CALCIUM BLD-MCNC: 9.5 MG/DL (ref 8.7–10.4)
CHLORIDE SERPL-SCNC: 109 MMOL/L (ref 98–112)
CO2 SERPL-SCNC: 26 MMOL/L (ref 21–32)
CREAT BLD-MCNC: 0.85 MG/DL
EGFRCR SERPLBLD CKD-EPI 2021: 70 ML/MIN/1.73M2 (ref 60–?)
FASTING STATUS PATIENT QL REPORTED: YES
GLOBULIN PLAS-MCNC: 2.4 G/DL (ref 2–3.5)
GLUCOSE BLD-MCNC: 99 MG/DL (ref 70–99)
OSMOLALITY SERPL CALC.SUM OF ELEC: 295 MOSM/KG (ref 275–295)
POTASSIUM SERPL-SCNC: 3.8 MMOL/L (ref 3.5–5.1)
PROT SERPL-MCNC: 7 G/DL (ref 5.7–8.2)
PTH-INTACT SERPL-MCNC: 108 PG/ML (ref 18.5–88)
SODIUM SERPL-SCNC: 142 MMOL/L (ref 136–145)
VIT D+METAB SERPL-MCNC: 33.7 NG/ML (ref 30–100)

## 2024-12-07 PROCEDURE — 36415 COLL VENOUS BLD VENIPUNCTURE: CPT

## 2024-12-07 PROCEDURE — 84080 ASSAY ALKALINE PHOSPHATASES: CPT

## 2024-12-07 PROCEDURE — 80053 COMPREHEN METABOLIC PANEL: CPT

## 2024-12-07 PROCEDURE — 82306 VITAMIN D 25 HYDROXY: CPT

## 2024-12-07 PROCEDURE — 83970 ASSAY OF PARATHORMONE: CPT

## 2024-12-11 ENCOUNTER — OFFICE VISIT (OUTPATIENT)
Dept: ENDOCRINOLOGY CLINIC | Facility: CLINIC | Age: 79
End: 2024-12-11

## 2024-12-11 VITALS
WEIGHT: 123 LBS | DIASTOLIC BLOOD PRESSURE: 56 MMHG | HEART RATE: 77 BPM | BODY MASS INDEX: 21.79 KG/M2 | SYSTOLIC BLOOD PRESSURE: 103 MMHG | HEIGHT: 63 IN

## 2024-12-11 DIAGNOSIS — M79.10 MYALGIA: Primary | ICD-10-CM

## 2024-12-11 LAB — ALKALINE PHOSPHATASE BONE SPECIFIC: 8.3 UG/L

## 2024-12-11 PROCEDURE — 1159F MED LIST DOCD IN RCRD: CPT | Performed by: INTERNAL MEDICINE

## 2024-12-11 PROCEDURE — 99213 OFFICE O/P EST LOW 20 MIN: CPT | Performed by: INTERNAL MEDICINE

## 2024-12-11 PROCEDURE — 3008F BODY MASS INDEX DOCD: CPT | Performed by: INTERNAL MEDICINE

## 2024-12-11 PROCEDURE — 3078F DIAST BP <80 MM HG: CPT | Performed by: INTERNAL MEDICINE

## 2024-12-11 PROCEDURE — 3074F SYST BP LT 130 MM HG: CPT | Performed by: INTERNAL MEDICINE

## 2024-12-11 NOTE — PROGRESS NOTES
Endocrine Follow-up  Subjective:  Patient is here today due to some pain in elbow area that she wanted me to look at.     History: She usually comes to see me for Prolia shots    Objective/Physical Exam:  Physical Exam:   General: Alert, orientated x3.  Cooperative.  No apparent distress.  Vital Signs:  Blood pressure 103/56, pulse 77, height 5' 3\" (1.6 m), weight 123 lb (55.8 kg).  HEENT: Exam is unremarkable.  Neck: Supple.  Lungs: Clear bilaterally.  Cardiac: Regular rate and rhythm.  Abdomen:  Bowel sounds present, normoactive.  Nontender.  Extremities:  No lower extremity edema noted.  Skin: Normal texture and turgor.  Lymphatic:  No cervical lymphadenopathy.    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 I do not think the the pain that she is experiencing is related to her previous Prolia shot.     Time spent on counseling/coordination of care:  15 Minutes    Total time spent with patient:  10    Annamarie Parrish MD  12/11/2024  3:37 PM

## 2024-12-13 ENCOUNTER — TELEPHONE (OUTPATIENT)
Dept: ENDOCRINOLOGY CLINIC | Facility: CLINIC | Age: 79
End: 2024-12-13

## 2024-12-13 DIAGNOSIS — E55.9 VITAMIN D DEFICIENCY: ICD-10-CM

## 2024-12-13 DIAGNOSIS — M80.00XA AGE-RELATED OSTEOPOROSIS WITH CURRENT PATHOLOGICAL FRACTURE, INITIAL ENCOUNTER: Primary | ICD-10-CM

## 2024-12-13 NOTE — TELEPHONE ENCOUNTER
Hi!  Please explain to patient that labs are within acceptable range. I will place labs for her to have done in 6 months after her NV. Thank you!

## 2024-12-18 NOTE — TELEPHONE ENCOUNTER
Spoke to patient to relay message below -patient stated understanding and will repeat labs prior to July/August NV for prolia  Patient scheduled for NV for next prolia 1/28/25

## 2025-01-02 ENCOUNTER — TELEPHONE (OUTPATIENT)
Dept: ENDOCRINOLOGY CLINIC | Facility: CLINIC | Age: 80
End: 2025-01-02

## 2025-01-02 NOTE — TELEPHONE ENCOUNTER
Pt's last Prolia injection was on 07/24/24 . Pt will be due for next injection on or after 01/25/25.     Pt has an upcoming appt on 01/28/25 for Prolia Injection with RN.        Submitted Prolia IV via [employer] portal  Awaiting SOB, 3-5 business days       IF SOB, states pt needs PA, one has already been authorized see below. Thanks    Prolia. Medication has been approved from 01/01/25 to 12/31/2025 through [employer]    Lv for pt to call back to confirm insurance information hasn't changed.

## 2025-01-02 NOTE — TELEPHONE ENCOUNTER
----- Message from Nellyjsandra HICKEY sent at 8/14/2024  2:46 PM CDT -----  Regarding: Prolia IV  Please start prolia IV.     Pt scheduled with Dr. Parrish on 1/28/25

## 2025-01-06 PROBLEM — M79.10 MYALGIA: Status: ACTIVE | Noted: 2025-01-06

## 2025-01-06 NOTE — TELEPHONE ENCOUNTER
Received patients SOB via [employer]. SOB stated, \"During the insurance verification, your patient’s insurance plan [employer] advised that they will not release benefit information to a third party, including [employer]®. Please contact the payer directly at [phone] to obtain benefits and prior authorization requirements for your patient.\"    Called patients insurance at [phone] and spoke to Ana Cristina who informed me that the authorization below is still valid, Prolia is approved from 05/2024 to 12/31/2025.    Authorization #:[id number]    Reference Call #: [id number]

## 2025-01-28 ENCOUNTER — NURSE ONLY (OUTPATIENT)
Dept: ENDOCRINOLOGY CLINIC | Facility: CLINIC | Age: 80
End: 2025-01-28

## 2025-01-28 DIAGNOSIS — M80.00XA AGE-RELATED OSTEOPOROSIS WITH CURRENT PATHOLOGICAL FRACTURE, INITIAL ENCOUNTER: Primary | ICD-10-CM

## 2025-01-28 PROCEDURE — 96372 THER/PROPH/DIAG INJ SC/IM: CPT | Performed by: INTERNAL MEDICINE

## 2025-01-28 NOTE — PROGRESS NOTES
Patient seen today for Prolia injection. Injection given to the left upper arm. Patient tolerated well.   used: No  Written medication information sheet provided: Yes     Discussed most common side effects of: bone and muscle pain, joint pain, dizziness, headache. Side effects typically only last up to 1 week.      Call office or be seen in ER with any of the following severe side effects: signs of allergic reaction (hives, difficulty breathing, redness or swelling at injection site, chest pain, shortness of breath), low blood calcium, osteonecrosis of the jaw.     Discussed with the patient if he/she plans on having any major dental work done to make sure their dentist and endocrinology provider is aware that they are taking prolia prior to treatment. This is due to increased risk of osteonecrosis or infection of the jaw while on this medication.      Today this is the 2nd injection.  Last prolia injection on: 7/24/2024  Summary of benefits completed: Yes  PA required/completed: PA approved. Per TE 1/2/2025  Last Prolia protocol labs: 12/7/2024  Last Dexa scan: 10/28/22  Patient taking vitamin D supplementation: Yes  Patient taking calcium supplementation: Yes     Patient advised to schedule 6 month follow up with MD, on or after 07/19/25.   Next labs due: Prior to MD visit   Future labs ordered: Yes. Pt reminded to complete prior to visit with MD. Appointment scheduled.       Staff message placed to MA pool ([facility] Medical Assistant) to perform repeat prolia insurance check in 4 months.

## 2025-02-14 ENCOUNTER — HOSPITAL ENCOUNTER (OUTPATIENT)
Dept: GENERAL RADIOLOGY | Facility: HOSPITAL | Age: 80
Discharge: HOME | End: 2025-02-14
Attending: INTERNAL MEDICINE
Payer: MEDICARE

## 2025-02-14 DIAGNOSIS — T14.8XXA FRACTURE: ICD-10-CM

## 2025-02-14 PROCEDURE — 73630 X-RAY EXAM OF FOOT: CPT | Performed by: INTERNAL MEDICINE

## 2025-06-17 ENCOUNTER — HOSPITAL ENCOUNTER (EMERGENCY)
Facility: HOSPITAL | Age: 80
Discharge: LEFT BEFORE DISCHARGE | End: 2025-06-17
Payer: MEDICARE

## 2025-06-17 NOTE — ED QUICK NOTES
During triage assessment, patient made the decision to leave the ER w/ plan to call PCP or return later if necessary.   As patient admits she has a limited amount of time.     
15-Mar-2024 10:15

## 2025-06-17 NOTE — ED INITIAL ASSESSMENT (HPI)
Patient presents to ER With complaints of swelling to bilateral ankles.  Patient also notes pain to the bottom of both feet as well.   Atraumatic.   Patient admits possible fluid retention.

## 2025-06-18 ENCOUNTER — LAB ENCOUNTER (OUTPATIENT)
Dept: LAB | Facility: HOSPITAL | Age: 80
End: 2025-06-18
Attending: INTERNAL MEDICINE
Payer: MEDICARE

## 2025-06-18 DIAGNOSIS — R79.0 CALCIUM BLOOD DECREASED: Primary | ICD-10-CM

## 2025-06-18 DIAGNOSIS — R70.0 ELEVATED SEDIMENTATION RATE: ICD-10-CM

## 2025-06-18 DIAGNOSIS — R76.0 RAISED ANTIBODY TITER: ICD-10-CM

## 2025-06-18 DIAGNOSIS — R79.89 HYPOURICEMIA: ICD-10-CM

## 2025-06-18 DIAGNOSIS — I50.20 SYSTOLIC HEART FAILURE (HCC): ICD-10-CM

## 2025-06-18 DIAGNOSIS — E29.1 3-OXO-5 ALPHA-STEROID DELTA 4-DEHYDROGENASE DEFICIENCY: ICD-10-CM

## 2025-06-18 LAB
BNP SERPL-MCNC: 81 PG/ML (ref ?–100)
CRP SERPL-MCNC: <0.4 MG/DL (ref ?–1)
ERYTHROCYTE [SEDIMENTATION RATE] IN BLOOD: 3 MM/HR (ref 0–30)
URATE SERPL-MCNC: 1.7 MG/DL (ref 3.1–7.8)

## 2025-06-18 PROCEDURE — 86225 DNA ANTIBODY NATIVE: CPT

## 2025-06-18 PROCEDURE — 83880 ASSAY OF NATRIURETIC PEPTIDE: CPT

## 2025-06-18 PROCEDURE — 85652 RBC SED RATE AUTOMATED: CPT

## 2025-06-18 PROCEDURE — 36415 COLL VENOUS BLD VENIPUNCTURE: CPT

## 2025-06-18 PROCEDURE — 86038 ANTINUCLEAR ANTIBODIES: CPT

## 2025-06-18 PROCEDURE — 86140 C-REACTIVE PROTEIN: CPT

## 2025-06-18 PROCEDURE — 84550 ASSAY OF BLOOD/URIC ACID: CPT

## 2025-06-19 LAB
DSDNA IGG SERPL IA-ACNC: 0.9 IU/ML (ref ?–10)
ENA AB SER QL IA: 0.1 UG/L (ref ?–0.7)
ENA AB SER QL IA: NEGATIVE

## 2025-06-25 ENCOUNTER — TELEPHONE (OUTPATIENT)
Dept: ENDOCRINOLOGY CLINIC | Facility: CLINIC | Age: 80
End: 2025-06-25

## 2025-06-25 NOTE — TELEPHONE ENCOUNTER
----- Message from Maria ORLANDO sent at 1/28/2025 10:03 AM CST -----  Regarding: Prolia  Patient had Prolia injection 1/28 and is due back 7/29.

## 2025-06-25 NOTE — TELEPHONE ENCOUNTER
Pt's last Prolia injection was on 1/28/25. Pt will be due for next injection on or after 7/28/25.     Pt has an upcoming appt on 7/29/25 for Prolia Injection with MD  ---  Submitted Prolia IV via [employer] portal  Awaiting SOB, 3-5 business days

## 2025-06-26 NOTE — TELEPHONE ENCOUNTER
Received SOB VIA FAX DATED ON 6/25/25    PA IS REQUIRED    OOPCOST;20%    FACILITY FEE;NA    ADMIN FEE;0%

## 2025-06-27 NOTE — TELEPHONE ENCOUNTER
Received fax from [facility] dated on 6/27/25 stating the Prolia  is available to you at the amount listed in your plan documents and you do not need an authorization. Pt has a upcoming appt

## 2025-06-27 NOTE — TELEPHONE ENCOUNTER
Medication  CGM  pump supply Requested: (Denosumab) Prolia      CoverMyMeds Used: Yes     Key: [id number]     Sig: Inject 60mg/ mL into the skin every 6 months.      DX Code: M81.0     Case Number/Pending Ref#: N/a     PA submitted via Cover My Meds with LOV 12/11/24 and Dexa 10/28/22  Awaiting determination

## 2025-07-24 ENCOUNTER — LAB ENCOUNTER (OUTPATIENT)
Dept: LAB | Facility: HOSPITAL | Age: 80
End: 2025-07-24
Attending: INTERNAL MEDICINE
Payer: MEDICARE

## 2025-07-24 DIAGNOSIS — E55.9 VITAMIN D DEFICIENCY: ICD-10-CM

## 2025-07-24 DIAGNOSIS — M80.00XA AGE-RELATED OSTEOPOROSIS WITH CURRENT PATHOLOGICAL FRACTURE, INITIAL ENCOUNTER: ICD-10-CM

## 2025-07-24 LAB
ALBUMIN SERPL-MCNC: 4.1 G/DL (ref 3.2–4.8)
ALBUMIN/GLOB SERPL: 2 {RATIO} (ref 1–2)
ALP LIVER SERPL-CCNC: 51 U/L (ref 55–142)
ALT SERPL-CCNC: 9 U/L (ref 10–49)
ANION GAP SERPL CALC-SCNC: 8 MMOL/L (ref 0–18)
AST SERPL-CCNC: 24 U/L (ref ?–34)
BILIRUB SERPL-MCNC: 0.5 MG/DL (ref 0.2–1.1)
BUN BLD-MCNC: 16 MG/DL (ref 9–23)
BUN/CREAT SERPL: 14.7 (ref 10–20)
CALCIUM BLD-MCNC: 8.5 MG/DL (ref 8.7–10.4)
CHLORIDE SERPL-SCNC: 109 MMOL/L (ref 98–112)
CO2 SERPL-SCNC: 27 MMOL/L (ref 21–32)
CREAT BLD-MCNC: 1.09 MG/DL (ref 0.55–1.02)
EGFRCR SERPLBLD CKD-EPI 2021: 51 ML/MIN/1.73M2 (ref 60–?)
FASTING STATUS PATIENT QL REPORTED: NO
GLOBULIN PLAS-MCNC: 2.1 G/DL (ref 2–3.5)
GLUCOSE BLD-MCNC: 74 MG/DL (ref 70–99)
OSMOLALITY SERPL CALC.SUM OF ELEC: 298 MOSM/KG (ref 275–295)
POTASSIUM SERPL-SCNC: 4 MMOL/L (ref 3.5–5.1)
PROT SERPL-MCNC: 6.2 G/DL (ref 5.7–8.2)
PTH-INTACT SERPL-MCNC: 81.6 PG/ML (ref 18.5–88)
SODIUM SERPL-SCNC: 144 MMOL/L (ref 136–145)
VIT D+METAB SERPL-MCNC: 38.2 NG/ML (ref 30–100)

## 2025-07-24 PROCEDURE — 82306 VITAMIN D 25 HYDROXY: CPT

## 2025-07-24 PROCEDURE — 80053 COMPREHEN METABOLIC PANEL: CPT

## 2025-07-24 PROCEDURE — 36415 COLL VENOUS BLD VENIPUNCTURE: CPT

## 2025-07-24 PROCEDURE — 83970 ASSAY OF PARATHORMONE: CPT

## 2025-07-24 PROCEDURE — 84080 ASSAY ALKALINE PHOSPHATASES: CPT

## 2025-07-28 LAB — ALKALINE PHOSPHATASE BONE SPECIFIC: 7.8 UG/L

## 2025-07-29 ENCOUNTER — NURSE ONLY (OUTPATIENT)
Dept: ENDOCRINOLOGY CLINIC | Facility: CLINIC | Age: 80
End: 2025-07-29

## 2025-07-29 DIAGNOSIS — M80.00XA AGE-RELATED OSTEOPOROSIS WITH CURRENT PATHOLOGICAL FRACTURE, INITIAL ENCOUNTER: Primary | ICD-10-CM

## 2025-07-29 PROCEDURE — 96372 THER/PROPH/DIAG INJ SC/IM: CPT | Performed by: INTERNAL MEDICINE

## (undated) DEVICE — PROXIMATE RH ROTATING HEAD SKIN STAPLERS (35 WIDE) CONTAINS 35 STAINLESS STEEL STAPLES: Brand: PROXIMATE

## (undated) DEVICE — FAN SPRAY KIT: Brand: PULSAVAC®

## (undated) DEVICE — STERILE LATEX POWDER-FREE SURGICAL GLOVESWITH NITRILE COATING: Brand: PROTEXIS

## (undated) DEVICE — DRAPE SHEET LG

## (undated) DEVICE — SUCTION CANISTER, 3000CC,SAFELINER: Brand: DEROYAL

## (undated) DEVICE — SUTURE ETHIBOND 2 V-37

## (undated) DEVICE — SUTURE VICRYL 2-0 FS-1

## (undated) DEVICE — SUTURE VICRYL 0 CP-1

## (undated) DEVICE — Device: Brand: STABLECUT®

## (undated) DEVICE — PILLOW ABDUCTION HIP MED

## (undated) DEVICE — PROXIMATE SKIN STAPLERS (35 WIDE) CONTAINS 35 STAINLESS STEEL STAPLES (FIXED HEAD): Brand: PROXIMATE

## (undated) DEVICE — SPLINT ORTH UNV HIP PD CUP LG

## (undated) DEVICE — SOL  .9 1000ML BTL

## (undated) DEVICE — 3M™ MEDITPORE™ SOFT CLOTH TAPE 6 IN X 10 YD 12 ROLLS/CASE 2966: Brand: 3M™ MEDIPORE™

## (undated) DEVICE — PACK CDS TOTAL HIP

## (undated) DEVICE — BATTERY

## (undated) DEVICE — TRAY SRGPRP PVP IOD WT SCRB SM

## (undated) NOTE — IP AVS SNAPSHOT
[facility]            (For Outpatient Use Only) Initial Admit Date: 1/18/2018   Inpt/Obs Admit Date: Inpt: 1/18/18 / Obs: N/A   Discharge Date:    Amanda Obregon:  [de-identified]   MRN: [de-identified]   CSN: [id number]        ENCOUNTER  Patient Class January 21, 2018

## (undated) NOTE — IP AVS SNAPSHOT
Patient Demographics     Address  [address] Phone  [phone] (Home) *Preferred*      Emergency Contact(s)     Name Relation Home Work Mobile    Amos Mariscal [phone]      Deshawn Karrieia   [phone]      Allergie Bring a paper prescription for each of these medications  HYDROcodone-acetaminophen 5-325 MG Tabs  rivaroxaban 10 MG Tabs     Information about where to get these medications is not yet available    Ask your nurse or doctor about these medications  douglas RBC 3.14 3.70 - 5.40 M/UL [facility]   HGB 9.5 12.0 - 16.0 g/dL [facility]   HCT 28.4 35.0 - 48.0 % [facility]   MCV 90.4 80.0 - 100.0 fL — [facility]   MCH 30.2 27.0 - 32.0 pg — [facility]   MCHC 33.3 32.0 - 37.0 g/dl [facility]   RDW • Family history unknown: Yes      reports that she has never smoked. She has never used smokeless tobacco. She reports that she does not drink alcohol or use drugs.     Allergies:  No Known Allergies    Home Medications:    Prescriptions Prior to Admission Plan right total hip arthroplasty. Geetha Cooper  1/18/2018  11:27 AM[JN.1]    Electronically signed by Pedro Alexander MD on 1/18/2018 11:28 AM   Attribution Mo Coles. 1 - Pedro Alexander MD on 1/18/2018 11:27 AM                        Discharge Summ - Hb 10.4 baseline unknown no labs done here previously  - H/H stable.    - asymptomatic.      Procedures/Imaging during hospitalization:   ?   Surgical Procedures     Case IDs Date Procedure Surgeon Location Status    [id number] 1/18/18 HIP TOTAL REPLACEMENT N Pulse:  [77-97] 77  Resp:  [16] 16  BP: (102-104)/(51-55) 104/51    Physical Exam:    General: No acute distress. Respiratory: Clear to auscultation bilaterally. No wheezes. No rhonchi. Cardiovascular: S1, S2. Regular rate and rhythm.  No murmurs, rubs o chair. Pt was educated on and given handout on posterior hip precautions; pt verbalized understanding, but she will need reinforcement. Pt needed Max A x 1 for sit to stand transfer. VC's for scooting to edge of chair, hand placement & limb placement.  Pt a wheelchair, bedside commode, etc.): A Lot   -   Moving from lying on back to sitting on the side of the bed?: A Lot   How much help from another person does the patient currently need. ..   -   Moving to and from a bed to a chair (including a wheelchair)?: Physical Therapy Note signed by Heather Hood PT at 1/19/2018  3:46 PM  Version 2 of 2    Author:  Heather Hood PT Service:  (none) Author Type:  Physical Therapist    Filed:  1/19/2018  3:46 PM Date of Service:  1/19/2018 12:00 PM Status: DISCHARGE RECOMMENDATIONS  PT Discharge Recommendations: Sub-acute rehabilitation    PLAN  PT Treatment Plan: Bed mobility; Patient education;Gait training;Range of motion;Strengthening;Balance training;Transfer training  Rehab Potential : Good  Frequency ( Lower extremity strength is within functional limits except for the following:  right hip and knee 3-/5    BALANCE  Static Sitting: Fair  Dynamic Sitting: Fair  Static Standing: Fair -  Dynamic Standing: Poor +         ACTIVITY TOLERANCE  O2 Saturation: 10 Patient Goal Patient's self-stated goal is: to move   Goal #1 Patient is able to demonstrate supine - sit EOB @ level: min assist   Goal #1   Current Status    Goal #2 Patient is able to demonstrate transfers Sit to/from Stand at assistance level: cga functional deficits include decreased rt le strength and arom with increased pain, limited ability to wt bear on rt le during gait with max cues requried, which are below the patient's pre-admission status.  Pt currently requires mod assist for supine to si Weight Bearing Restriction: R lower extremity        R Lower Extremity: Weight Bearing as Tolerated       PAIN ASSESSMENT  Ratin (decreased to 6/10 post session)  Location: right hip  Management Techniques:  Activity promotion;Relaxation;Repositioning second person for safety and iv management     Bed Mobility: Pt demo supine to sit with mod assist x 1 with increased time, cues for sequence and post hip precaution with mobility.      Transfers: Pt demo sit to stand from bed with mod assist x 1 with rw a :  Evan Hong OT (Occupational Therapist)       OCCUPATIONAL THERAPY EVALUATION - INPATIENT      Room Number: 436/436-A  Evaluation Date: 1/19/2018  Type of Evaluation: Initial[SASHA.1]  Presenting Problem: l kevon[SASHA.2]    Physician Order: IP Con OCCUPATIONAL THERAPY MEDICAL/SOCIAL HISTORY     Problem List[SASHA.1]   Principal Problem:    Osteoarthritis of right hip[SASHA.2]    Past Medical History[SASHA.1]  Past Medical History:   Diagnosis Date   • Endometriosis    • Parkinson's disease ([facility]    • Amanda How much help from another person does the patient currently need…[SASHA.1]  -   Putting on and taking off regular lower body clothing?: A Lot  -   Bathing (including washing, rinsing, drying)?: A Lot  -   Toileting, which includes using toilet, bedpan or uri Attribution Thomas    Michelle Hameed. 1 - Lena Nichols, OT on 1/19/2018  3:00 PM  SASHA. 2 - Jeff Bedolla, OT on 1/19/2018  3:01 PM  SASHA. 3 - Jeff Bedolla OT on 1/19/2018  3:14 PM                     Video Swallow Study Notes     No notes of this type exist for th